# Patient Record
Sex: MALE | Race: WHITE | NOT HISPANIC OR LATINO | URBAN - METROPOLITAN AREA
[De-identification: names, ages, dates, MRNs, and addresses within clinical notes are randomized per-mention and may not be internally consistent; named-entity substitution may affect disease eponyms.]

---

## 2017-05-30 ENCOUNTER — OUTPATIENT (OUTPATIENT)
Dept: OUTPATIENT SERVICES | Facility: HOSPITAL | Age: 77
LOS: 1 days | Discharge: HOME | End: 2017-05-30

## 2017-06-28 DIAGNOSIS — I48.91 UNSPECIFIED ATRIAL FIBRILLATION: ICD-10-CM

## 2017-06-28 DIAGNOSIS — Z79.01 LONG TERM (CURRENT) USE OF ANTICOAGULANTS: ICD-10-CM

## 2017-07-19 ENCOUNTER — OUTPATIENT (OUTPATIENT)
Dept: OUTPATIENT SERVICES | Facility: HOSPITAL | Age: 77
LOS: 1 days | Discharge: HOME | End: 2017-07-19

## 2017-07-19 DIAGNOSIS — Z79.01 LONG TERM (CURRENT) USE OF ANTICOAGULANTS: ICD-10-CM

## 2017-07-19 DIAGNOSIS — I48.91 UNSPECIFIED ATRIAL FIBRILLATION: ICD-10-CM

## 2017-08-30 ENCOUNTER — OUTPATIENT (OUTPATIENT)
Dept: OUTPATIENT SERVICES | Facility: HOSPITAL | Age: 77
LOS: 1 days | Discharge: HOME | End: 2017-08-30

## 2017-08-30 DIAGNOSIS — Z79.01 LONG TERM (CURRENT) USE OF ANTICOAGULANTS: ICD-10-CM

## 2017-08-30 DIAGNOSIS — I48.91 UNSPECIFIED ATRIAL FIBRILLATION: ICD-10-CM

## 2017-10-11 ENCOUNTER — OUTPATIENT (OUTPATIENT)
Dept: OUTPATIENT SERVICES | Facility: HOSPITAL | Age: 77
LOS: 1 days | Discharge: HOME | End: 2017-10-11

## 2017-10-11 DIAGNOSIS — I48.91 UNSPECIFIED ATRIAL FIBRILLATION: ICD-10-CM

## 2017-10-11 DIAGNOSIS — Z79.01 LONG TERM (CURRENT) USE OF ANTICOAGULANTS: ICD-10-CM

## 2017-11-21 ENCOUNTER — OUTPATIENT (OUTPATIENT)
Dept: OUTPATIENT SERVICES | Facility: HOSPITAL | Age: 77
LOS: 1 days | Discharge: HOME | End: 2017-11-21

## 2017-11-21 DIAGNOSIS — Z79.01 LONG TERM (CURRENT) USE OF ANTICOAGULANTS: ICD-10-CM

## 2017-11-21 DIAGNOSIS — I48.91 UNSPECIFIED ATRIAL FIBRILLATION: ICD-10-CM

## 2018-01-02 ENCOUNTER — OUTPATIENT (OUTPATIENT)
Dept: OUTPATIENT SERVICES | Facility: HOSPITAL | Age: 78
LOS: 1 days | Discharge: HOME | End: 2018-01-02

## 2018-01-02 DIAGNOSIS — I48.91 UNSPECIFIED ATRIAL FIBRILLATION: ICD-10-CM

## 2018-01-02 DIAGNOSIS — Z79.01 LONG TERM (CURRENT) USE OF ANTICOAGULANTS: ICD-10-CM

## 2018-02-13 ENCOUNTER — OUTPATIENT (OUTPATIENT)
Dept: OUTPATIENT SERVICES | Facility: HOSPITAL | Age: 78
LOS: 1 days | Discharge: HOME | End: 2018-02-13

## 2018-02-13 DIAGNOSIS — Z79.01 LONG TERM (CURRENT) USE OF ANTICOAGULANTS: ICD-10-CM

## 2018-02-13 DIAGNOSIS — I48.91 UNSPECIFIED ATRIAL FIBRILLATION: ICD-10-CM

## 2018-03-27 ENCOUNTER — OUTPATIENT (OUTPATIENT)
Dept: OUTPATIENT SERVICES | Facility: HOSPITAL | Age: 78
LOS: 1 days | Discharge: HOME | End: 2018-03-27

## 2018-03-27 DIAGNOSIS — Z79.01 LONG TERM (CURRENT) USE OF ANTICOAGULANTS: ICD-10-CM

## 2018-03-27 DIAGNOSIS — I48.91 UNSPECIFIED ATRIAL FIBRILLATION: ICD-10-CM

## 2018-05-08 ENCOUNTER — OUTPATIENT (OUTPATIENT)
Dept: OUTPATIENT SERVICES | Facility: HOSPITAL | Age: 78
LOS: 1 days | Discharge: HOME | End: 2018-05-08

## 2018-05-08 DIAGNOSIS — Z79.01 LONG TERM (CURRENT) USE OF ANTICOAGULANTS: ICD-10-CM

## 2018-05-08 DIAGNOSIS — I48.91 UNSPECIFIED ATRIAL FIBRILLATION: ICD-10-CM

## 2018-06-19 ENCOUNTER — OUTPATIENT (OUTPATIENT)
Dept: OUTPATIENT SERVICES | Facility: HOSPITAL | Age: 78
LOS: 1 days | Discharge: HOME | End: 2018-06-19

## 2018-06-19 DIAGNOSIS — Z79.01 LONG TERM (CURRENT) USE OF ANTICOAGULANTS: ICD-10-CM

## 2018-06-19 DIAGNOSIS — I48.91 UNSPECIFIED ATRIAL FIBRILLATION: ICD-10-CM

## 2018-07-31 ENCOUNTER — OUTPATIENT (OUTPATIENT)
Dept: OUTPATIENT SERVICES | Facility: HOSPITAL | Age: 78
LOS: 1 days | Discharge: HOME | End: 2018-07-31

## 2018-07-31 DIAGNOSIS — Z79.01 LONG TERM (CURRENT) USE OF ANTICOAGULANTS: ICD-10-CM

## 2018-07-31 DIAGNOSIS — I48.91 UNSPECIFIED ATRIAL FIBRILLATION: ICD-10-CM

## 2018-08-28 ENCOUNTER — OUTPATIENT (OUTPATIENT)
Dept: OUTPATIENT SERVICES | Facility: HOSPITAL | Age: 78
LOS: 1 days | Discharge: HOME | End: 2018-08-28

## 2018-08-28 DIAGNOSIS — R07.9 CHEST PAIN, UNSPECIFIED: ICD-10-CM

## 2018-08-28 DIAGNOSIS — R06.02 SHORTNESS OF BREATH: ICD-10-CM

## 2018-09-18 ENCOUNTER — OUTPATIENT (OUTPATIENT)
Dept: OUTPATIENT SERVICES | Facility: HOSPITAL | Age: 78
LOS: 1 days | Discharge: HOME | End: 2018-09-18

## 2018-09-18 DIAGNOSIS — Z79.01 LONG TERM (CURRENT) USE OF ANTICOAGULANTS: ICD-10-CM

## 2018-09-18 DIAGNOSIS — I48.91 UNSPECIFIED ATRIAL FIBRILLATION: ICD-10-CM

## 2018-09-18 LAB
POCT INR: 2.8 RATIO — HIGH (ref 0.9–1.2)
POCT PT: 33.3 SEC — HIGH (ref 10–13.4)

## 2018-09-28 ENCOUNTER — APPOINTMENT (OUTPATIENT)
Dept: CARDIOTHORACIC SURGERY | Facility: CLINIC | Age: 78
End: 2018-09-28
Payer: MEDICARE

## 2018-09-28 PROCEDURE — 99204 OFFICE O/P NEW MOD 45 MIN: CPT

## 2018-10-02 ENCOUNTER — OUTPATIENT (OUTPATIENT)
Dept: OUTPATIENT SERVICES | Facility: HOSPITAL | Age: 78
LOS: 1 days | Discharge: HOME | End: 2018-10-02

## 2018-10-02 DIAGNOSIS — Z79.01 LONG TERM (CURRENT) USE OF ANTICOAGULANTS: ICD-10-CM

## 2018-10-02 DIAGNOSIS — I25.10 ATHEROSCLEROTIC HEART DISEASE OF NATIVE CORONARY ARTERY WITHOUT ANGINA PECTORIS: ICD-10-CM

## 2018-10-02 DIAGNOSIS — Z01.810 ENCOUNTER FOR PREPROCEDURAL CARDIOVASCULAR EXAMINATION: ICD-10-CM

## 2018-10-04 ENCOUNTER — INPATIENT (INPATIENT)
Facility: HOSPITAL | Age: 78
LOS: 7 days | Discharge: HOME | End: 2018-10-12
Attending: INTERNAL MEDICINE | Admitting: INTERNAL MEDICINE

## 2018-10-04 VITALS
HEART RATE: 56 BPM | OXYGEN SATURATION: 96 % | RESPIRATION RATE: 19 BRPM | DIASTOLIC BLOOD PRESSURE: 79 MMHG | TEMPERATURE: 96 F | SYSTOLIC BLOOD PRESSURE: 158 MMHG

## 2018-10-04 DIAGNOSIS — Z95.1 PRESENCE OF AORTOCORONARY BYPASS GRAFT: Chronic | ICD-10-CM

## 2018-10-04 DIAGNOSIS — Z98.890 OTHER SPECIFIED POSTPROCEDURAL STATES: Chronic | ICD-10-CM

## 2018-10-04 PROBLEM — Z00.00 ENCOUNTER FOR PREVENTIVE HEALTH EXAMINATION: Status: ACTIVE | Noted: 2018-10-04

## 2018-10-04 LAB
ALBUMIN SERPL ELPH-MCNC: 3.8 G/DL — SIGNIFICANT CHANGE UP (ref 3.5–5.2)
ALP SERPL-CCNC: 153 U/L — HIGH (ref 30–115)
ALT FLD-CCNC: 18 U/L — SIGNIFICANT CHANGE UP (ref 0–41)
ANION GAP SERPL CALC-SCNC: 13 MMOL/L — SIGNIFICANT CHANGE UP (ref 7–14)
AST SERPL-CCNC: 30 U/L — SIGNIFICANT CHANGE UP (ref 0–41)
BILIRUB SERPL-MCNC: 1.2 MG/DL — SIGNIFICANT CHANGE UP (ref 0.2–1.2)
BUN SERPL-MCNC: 15 MG/DL — SIGNIFICANT CHANGE UP (ref 10–20)
CALCIUM SERPL-MCNC: 8.8 MG/DL — SIGNIFICANT CHANGE UP (ref 8.5–10.1)
CHLORIDE SERPL-SCNC: 105 MMOL/L — SIGNIFICANT CHANGE UP (ref 98–110)
CO2 SERPL-SCNC: 22 MMOL/L — SIGNIFICANT CHANGE UP (ref 17–32)
CREAT SERPL-MCNC: 0.8 MG/DL — SIGNIFICANT CHANGE UP (ref 0.7–1.5)
GLUCOSE BLDC GLUCOMTR-MCNC: 107 MG/DL — HIGH (ref 70–99)
GLUCOSE BLDC GLUCOMTR-MCNC: 93 MG/DL — SIGNIFICANT CHANGE UP (ref 70–99)
GLUCOSE SERPL-MCNC: 108 MG/DL — HIGH (ref 70–99)
HCT VFR BLD CALC: 39.1 % — LOW (ref 42–52)
HGB BLD-MCNC: 12.8 G/DL — LOW (ref 14–18)
INR BLD: 3.38 RATIO — HIGH (ref 0.65–1.3)
MAGNESIUM SERPL-MCNC: 1.9 MG/DL — SIGNIFICANT CHANGE UP (ref 1.8–2.4)
MCHC RBC-ENTMCNC: 31.7 PG — HIGH (ref 27–31)
MCHC RBC-ENTMCNC: 32.7 G/DL — SIGNIFICANT CHANGE UP (ref 32–37)
MCV RBC AUTO: 96.8 FL — HIGH (ref 80–94)
NRBC # BLD: 0 /100 WBCS — SIGNIFICANT CHANGE UP (ref 0–0)
PLATELET # BLD AUTO: 120 K/UL — LOW (ref 130–400)
POTASSIUM SERPL-MCNC: 4.7 MMOL/L — SIGNIFICANT CHANGE UP (ref 3.5–5)
POTASSIUM SERPL-SCNC: 4.7 MMOL/L — SIGNIFICANT CHANGE UP (ref 3.5–5)
PROT SERPL-MCNC: 6.6 G/DL — SIGNIFICANT CHANGE UP (ref 6–8)
PROTHROM AB SERPL-ACNC: 36.1 SEC — HIGH (ref 9.95–12.87)
RBC # BLD: 4.04 M/UL — LOW (ref 4.7–6.1)
RBC # FLD: 15.1 % — HIGH (ref 11.5–14.5)
SODIUM SERPL-SCNC: 140 MMOL/L — SIGNIFICANT CHANGE UP (ref 135–146)
WBC # BLD: 5.84 K/UL — SIGNIFICANT CHANGE UP (ref 4.8–10.8)
WBC # FLD AUTO: 5.84 K/UL — SIGNIFICANT CHANGE UP (ref 4.8–10.8)

## 2018-10-04 RX ORDER — DEXTROSE 50 % IN WATER 50 %
12.5 SYRINGE (ML) INTRAVENOUS ONCE
Qty: 0 | Refills: 0 | Status: DISCONTINUED | OUTPATIENT
Start: 2018-10-04 | End: 2018-10-11

## 2018-10-04 RX ORDER — AMIODARONE HYDROCHLORIDE 400 MG/1
200 TABLET ORAL DAILY
Qty: 0 | Refills: 0 | Status: DISCONTINUED | OUTPATIENT
Start: 2018-10-04 | End: 2018-10-05

## 2018-10-04 RX ORDER — WARFARIN SODIUM 2.5 MG/1
1 TABLET ORAL
Qty: 0 | Refills: 0 | COMMUNITY

## 2018-10-04 RX ORDER — LOSARTAN POTASSIUM 100 MG/1
25 TABLET, FILM COATED ORAL DAILY
Qty: 0 | Refills: 0 | Status: DISCONTINUED | OUTPATIENT
Start: 2018-10-04 | End: 2018-10-11

## 2018-10-04 RX ORDER — INSULIN GLARGINE 100 [IU]/ML
15 INJECTION, SOLUTION SUBCUTANEOUS AT BEDTIME
Qty: 0 | Refills: 0 | Status: DISCONTINUED | OUTPATIENT
Start: 2018-10-04 | End: 2018-10-08

## 2018-10-04 RX ORDER — DEXTROSE 50 % IN WATER 50 %
25 SYRINGE (ML) INTRAVENOUS ONCE
Qty: 0 | Refills: 0 | Status: DISCONTINUED | OUTPATIENT
Start: 2018-10-04 | End: 2018-10-11

## 2018-10-04 RX ORDER — FUROSEMIDE 40 MG
1 TABLET ORAL
Qty: 0 | Refills: 0 | COMMUNITY

## 2018-10-04 RX ORDER — GLUCAGON INJECTION, SOLUTION 0.5 MG/.1ML
1 INJECTION, SOLUTION SUBCUTANEOUS ONCE
Qty: 0 | Refills: 0 | Status: DISCONTINUED | OUTPATIENT
Start: 2018-10-04 | End: 2018-10-11

## 2018-10-04 RX ORDER — UBIDECARENONE 100 MG
1 CAPSULE ORAL
Qty: 0 | Refills: 0 | COMMUNITY

## 2018-10-04 RX ORDER — METFORMIN HYDROCHLORIDE 850 MG/1
1 TABLET ORAL
Qty: 0 | Refills: 0 | COMMUNITY

## 2018-10-04 RX ORDER — AMIODARONE HYDROCHLORIDE 400 MG/1
150 TABLET ORAL ONCE
Qty: 0 | Refills: 0 | Status: COMPLETED | OUTPATIENT
Start: 2018-10-04 | End: 2018-10-04

## 2018-10-04 RX ORDER — AMIODARONE HYDROCHLORIDE 400 MG/1
1 TABLET ORAL
Qty: 900 | Refills: 0 | Status: DISCONTINUED | OUTPATIENT
Start: 2018-10-04 | End: 2018-10-05

## 2018-10-04 RX ORDER — FUROSEMIDE 40 MG
20 TABLET ORAL DAILY
Qty: 0 | Refills: 0 | Status: DISCONTINUED | OUTPATIENT
Start: 2018-10-04 | End: 2018-10-11

## 2018-10-04 RX ORDER — SODIUM CHLORIDE 9 MG/ML
1000 INJECTION, SOLUTION INTRAVENOUS
Qty: 0 | Refills: 0 | Status: DISCONTINUED | OUTPATIENT
Start: 2018-10-04 | End: 2018-10-09

## 2018-10-04 RX ORDER — INSULIN LISPRO 100/ML
5 VIAL (ML) SUBCUTANEOUS
Qty: 0 | Refills: 0 | Status: DISCONTINUED | OUTPATIENT
Start: 2018-10-04 | End: 2018-10-11

## 2018-10-04 RX ORDER — EZETIMIBE 10 MG/1
1 TABLET ORAL
Qty: 0 | Refills: 0 | COMMUNITY

## 2018-10-04 RX ORDER — AMLODIPINE BESYLATE 2.5 MG/1
5 TABLET ORAL DAILY
Qty: 0 | Refills: 0 | Status: DISCONTINUED | OUTPATIENT
Start: 2018-10-04 | End: 2018-10-05

## 2018-10-04 RX ORDER — AMIODARONE HYDROCHLORIDE 400 MG/1
0.5 TABLET ORAL
Qty: 900 | Refills: 0 | Status: DISCONTINUED | OUTPATIENT
Start: 2018-10-04 | End: 2018-10-04

## 2018-10-04 RX ORDER — SIMVASTATIN 20 MG/1
20 TABLET, FILM COATED ORAL AT BEDTIME
Qty: 0 | Refills: 0 | Status: DISCONTINUED | OUTPATIENT
Start: 2018-10-04 | End: 2018-10-11

## 2018-10-04 RX ORDER — SIMVASTATIN 20 MG/1
1 TABLET, FILM COATED ORAL
Qty: 0 | Refills: 0 | COMMUNITY

## 2018-10-04 RX ORDER — LOSARTAN POTASSIUM 100 MG/1
1 TABLET, FILM COATED ORAL
Qty: 0 | Refills: 0 | COMMUNITY

## 2018-10-04 RX ORDER — DEXTROSE 50 % IN WATER 50 %
15 SYRINGE (ML) INTRAVENOUS ONCE
Qty: 0 | Refills: 0 | Status: DISCONTINUED | OUTPATIENT
Start: 2018-10-04 | End: 2018-10-11

## 2018-10-04 RX ADMIN — LOSARTAN POTASSIUM 25 MILLIGRAM(S): 100 TABLET, FILM COATED ORAL at 18:57

## 2018-10-04 RX ADMIN — SIMVASTATIN 20 MILLIGRAM(S): 20 TABLET, FILM COATED ORAL at 21:44

## 2018-10-04 RX ADMIN — AMIODARONE HYDROCHLORIDE 618 MILLIGRAM(S): 400 TABLET ORAL at 12:30

## 2018-10-04 RX ADMIN — Medication 20 MILLIGRAM(S): at 18:59

## 2018-10-04 NOTE — PROGRESS NOTE ADULT - SUBJECTIVE AND OBJECTIVE BOX
PREOPERATIVE DAY OR PROCEDURE EVALUATION    I have personally seen and examined the patient.  I agree with the history and physical which I have reviewed and noted any changes below.  (ELECTRONICALLY SIGNED: - [id number] @ 13:15)                                                              POST OPERATIVE PROCEDURAL DOCUMENTATION  PRE-OP DIAGNOSIS: MR     POST-OP DIAGNOSIS Severe MR     PROCEDURE: Transesophageal echocardiogram    Primary Physician: Shamar Griffiths MD  Fellow: George Alvarado     ANESTHESIA TYPE  [  ] General Anesthesia  [ x ] Conscious Sedation  [  ] Local/Regional    CONDITION  [  ] Critical  [  ] Serious  [  ] Fair  [ x ] Good    SPECIMENS REMOVED (IF APPLICABLE): NA    IMPLANTS (IF APPLICABLE): None    ESTIMATED BLOOD LOSS: None    COMPLICATIONS: None    FINDINGS  LVEF 50%   Severe MR   Moderate to severe TR   Aortic valve trileaflet no AS   Pulmonary valve normal   LA mildly enlarged   LUIS M no evidence of thrombus mildly decreased PW velocities     DIAGNOSIS/IMPRESSION:  Serer MR   During the procedure the patient went into monomorphic VT SP Lidocaine IV and Electrical cardioversion x 2 .   Currently in NSR and in stable condition     PLAN OF CARE:  Will admit to CCU   STAT blood work sent   Possible coronary angiogram tomorrow or Monday according to INR values   hold coumadin for now   Hold BB for bradycardia PREOPERATIVE DAY OR PROCEDURE EVALUATION    I have personally seen and examined the patient.  I agree with the history and physical which I have reviewed and noted any changes below.  (ELECTRONICALLY SIGNED: - [id number] @ 13:15)                                                              POST OPERATIVE PROCEDURAL DOCUMENTATION  PRE-OP DIAGNOSIS: MR     POST-OP DIAGNOSIS Severe MR     PROCEDURE: Transesophageal echocardiogram    Primary Physician: Shamar Griffiths MD  Fellow: George Alvarado     ANESTHESIA TYPE  [  ] General Anesthesia  [ x ] Conscious Sedation  [  ] Local/Regional    CONDITION  [  ] Critical  [  ] Serious  [  ] Fair  [ x ] Good    SPECIMENS REMOVED (IF APPLICABLE): NA    IMPLANTS (IF APPLICABLE): None    ESTIMATED BLOOD LOSS: None    COMPLICATIONS:Pt developed sustained VTach during procedure without EKG changes, without hypoxia that required electrical cardioverion with 100 joules biphasic   Pt developed VT again post procedure that was given 100 joules as it broke   Amio 150mg was given   and pt was to be admitted to CCU for eventual cath and possible ICD  and evnetual mitral clip whenstable in the future       FINDINGS  LVEF 50%   Severe MR   Moderate to severe TR   Aortic valve trileaflet no AS   Pulmonary valve normal   LA mildly enlarged   LUIS M no evidence of thrombus mildly decreased PW velocities     DIAGNOSIS/IMPRESSION:  Serer MR   During the procedure the patient went into monomorphic VT SP Lidocaine IV and Electrical cardioversion x 2 .   Currently in NSR and in stable condition     PLAN OF CARE:  Will admit to CCU   STAT blood work sent   Possible coronary angiogram tomorrow or Monday according to INR values   hold coumadin for now   Hold BB for bradycardia

## 2018-10-04 NOTE — H&P CARDIOLOGY - HISTORY OF PRESENT ILLNESS
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

## 2018-10-04 NOTE — CONSULT NOTE ADULT - SUBJECTIVE AND OBJECTIVE BOX
HISTORY OF PRESENT ILLNESS:   78 years old male with PMHX of CAD s/p  CABG 25 years ago (LIMA to LAD SVG to CX and RCA), old inferior MI, HTN,  NIDDM, EF ~ 45% EF appears, Afib on coumadin, severe MR with elevated PAP ~ 60 mmHg presented initially for elective MELBA for further evaluation of the MR.   During the MELBA procedure, patient developed sustained VTach x2 time  , BP stable, Patient received in total two shock of 100 joules and one dose of IV lidocaine , and 150 mg of IV amiodarone  , currently on afib with slow rater ~ 50's , amiodarone drip stopped.   patient seen at bedside, awake oriented, denies chest pain , active shortness of breath or palpitations. was complaining of chronic dyspnea on exertion at home.        PAST MEDICAL & SURGICAL HISTORY:  Mitral regurgitation  Afib  Diabetes  Hypertension  MI, old  CAD (coronary artery disease)  S/P subdural hematoma evacuation  S/P CABG x 1    FAMILY HISTORY:  No pertinent family history in first degree relatives    Allergies    Biaxin (Stomach Upset)    Intolerances    	  Home Medications:  CoQ10 300 mg oral capsule: 1 cap(s) orally once a day (04 Oct 2018 12:42)  ezetimibe 10 mg oral tablet: 1 tab(s) orally once a day (04 Oct 2018 12:42)  furosemide 20 mg oral tablet: 1 tab(s) orally once a day (04 Oct 2018 12:42)  losartan 25 mg oral tablet: 1 tab(s) orally once a day (04 Oct 2018 12:42)  metFORMIN 500 mg oral tablet, extended release: 1 tab(s) orally once a day (04 Oct 2018 12:42)  metoprolol succinate 25 mg oral tablet, extended release: 1 tab(s) orally once a day (04 Oct 2018 12:42)  simvastatin 20 mg oral tablet: 1 tab(s) orally once a day (at bedtime) (04 Oct 2018 12:42)  warfarin 2.5 mg oral tablet: 1 tab(s) orally once a day (04 Oct 2018 12:42)    MEDICATIONS  (STANDING):  amiodarone    Tablet 200 milliGRAM(s) Oral daily  amiodarone Infusion 1 mG/Min (33.333 mL/Hr) IV Continuous <Continuous>  dextrose 5%. 1000 milliLiter(s) (50 mL/Hr) IV Continuous <Continuous>  dextrose 50% Injectable 12.5 Gram(s) IV Push once  dextrose 50% Injectable 25 Gram(s) IV Push once  dextrose 50% Injectable 25 Gram(s) IV Push once  furosemide    Tablet 20 milliGRAM(s) Oral daily  insulin glargine Injectable (LANTUS) 15 Unit(s) SubCutaneous at bedtime  insulin lispro Injectable (HumaLOG) 5 Unit(s) SubCutaneous before breakfast  insulin lispro Injectable (HumaLOG) 5 Unit(s) SubCutaneous before lunch  insulin lispro Injectable (HumaLOG) 5 Unit(s) SubCutaneous before dinner  losartan 25 milliGRAM(s) Oral daily  simvastatin 20 milliGRAM(s) Oral at bedtime    MEDICATIONS  (PRN):  dextrose 40% Gel 15 Gram(s) Oral once PRN Blood Glucose LESS THAN 70 milliGRAM(s)/deciliter  glucagon  Injectable 1 milliGRAM(s) IntraMuscular once PRN Glucose LESS THAN 70 milligrams/deciliter          General Appearance: Normal	  Cardiovascular: Normal S1 S2, systolic apical murmur   Respiratory: Lungs clear   Psychiatry: A & O x 3,   Gastrointestinal:  Soft, Non-tender  Extremities: no ARLEEN      LABS:	 	                        12.8   5.84  )-----------( 120      ( 04 Oct 2018 12:20 )             39.1     10-04    140  |  105  |  15  ----------------------------<  108<H>  4.7   |  22  |  0.8    Ca    8.8      04 Oct 2018 12:20  Mg     1.9     10-04    TPro  6.6  /  Alb  3.8  /  TBili  1.2  /  DBili  x   /  AST  30  /  ALT  18  /  AlkPhos  153<H>  10-04         ECG:  afib , inferior wall MI 	    	    PREVIOUS DIAGNOSTIC TESTING:    [ ] Echocardiogram: 8/20/2018  EF 45-50%  severe MR  mild concentric LVH  moderate TR +      [ ] Nuclear Stress Test:  	8/29/2018    < from: NM Nuclear Stress Pharmacologic Multiple (08.28.18 @ 10:41) >  Impression:  1. IV Adenosine Dual Isotope Study which was negative with respect to   symptoms and EKG changes.  2. Myocardial perfusion imaging reveals infarction in the distribution of  left circumflex coronary artery with minimal superimposed ischemia with a   persistently dilated left ventricle  3. Gated imaging reveals severe hypokinesia and decreased thickening of   the inferolateral wall with ejection fraction moderately reduced at 40-45%    < end of copied text >

## 2018-10-04 NOTE — CONSULT NOTE ADULT - ASSESSMENT
-Sustained Vtach during MELBA, afib with aberrancy, with baseline permanent afib, ischemia vs old scar origin.   -severe MR  -hx of CAD s/p CABG   -HTN, DM  -CHADSVASC = 5    Plan:  CCU monitoring  R/o ischemic event will need cardiac cath likely on Monday given elevated INR  hold coumadin for now prior to cath, can bridge with heparin or lovenox once INR < 2 prior to cath  can continue amiodarone po   continue statin   Eventually will need ICD/life vest

## 2018-10-04 NOTE — H&P CARDIOLOGY - PMH
Afib    CAD (coronary artery disease)    Diabetes    Hypertension    MI, old    Mitral regurgitation

## 2018-10-05 DIAGNOSIS — Z02.9 ENCOUNTER FOR ADMINISTRATIVE EXAMINATIONS, UNSPECIFIED: ICD-10-CM

## 2018-10-05 LAB
ALBUMIN SERPL ELPH-MCNC: 4.2 G/DL — SIGNIFICANT CHANGE UP (ref 3.5–5.2)
ALP SERPL-CCNC: 168 U/L — HIGH (ref 30–115)
ALT FLD-CCNC: 21 U/L — SIGNIFICANT CHANGE UP (ref 0–41)
ANION GAP SERPL CALC-SCNC: 15 MMOL/L — HIGH (ref 7–14)
ANION GAP SERPL CALC-SCNC: 18 MMOL/L — HIGH (ref 7–14)
ANION GAP SERPL CALC-SCNC: 20 MMOL/L — HIGH (ref 7–14)
APTT BLD: 47.1 SEC — HIGH (ref 27–39.2)
AST SERPL-CCNC: 32 U/L — SIGNIFICANT CHANGE UP (ref 0–41)
BILIRUB SERPL-MCNC: 2 MG/DL — HIGH (ref 0.2–1.2)
BUN SERPL-MCNC: 13 MG/DL — SIGNIFICANT CHANGE UP (ref 10–20)
BUN SERPL-MCNC: 14 MG/DL — SIGNIFICANT CHANGE UP (ref 10–20)
BUN SERPL-MCNC: 15 MG/DL — SIGNIFICANT CHANGE UP (ref 10–20)
CALCIUM SERPL-MCNC: 9.1 MG/DL — SIGNIFICANT CHANGE UP (ref 8.5–10.1)
CALCIUM SERPL-MCNC: 9.2 MG/DL — SIGNIFICANT CHANGE UP (ref 8.5–10.1)
CALCIUM SERPL-MCNC: 9.6 MG/DL — SIGNIFICANT CHANGE UP (ref 8.5–10.1)
CHLORIDE SERPL-SCNC: 101 MMOL/L — SIGNIFICANT CHANGE UP (ref 98–110)
CHLORIDE SERPL-SCNC: 96 MMOL/L — LOW (ref 98–110)
CHLORIDE SERPL-SCNC: 99 MMOL/L — SIGNIFICANT CHANGE UP (ref 98–110)
CK MB CFR SERPL CALC: 2.9 NG/ML — SIGNIFICANT CHANGE UP (ref 0.6–6.3)
CK SERPL-CCNC: 58 U/L — SIGNIFICANT CHANGE UP (ref 0–225)
CK SERPL-CCNC: 58 U/L — SIGNIFICANT CHANGE UP (ref 0–225)
CO2 SERPL-SCNC: 21 MMOL/L — SIGNIFICANT CHANGE UP (ref 17–32)
CO2 SERPL-SCNC: 24 MMOL/L — SIGNIFICANT CHANGE UP (ref 17–32)
CO2 SERPL-SCNC: 25 MMOL/L — SIGNIFICANT CHANGE UP (ref 17–32)
CREAT SERPL-MCNC: 0.7 MG/DL — SIGNIFICANT CHANGE UP (ref 0.7–1.5)
CREAT SERPL-MCNC: 0.8 MG/DL — SIGNIFICANT CHANGE UP (ref 0.7–1.5)
CREAT SERPL-MCNC: 0.8 MG/DL — SIGNIFICANT CHANGE UP (ref 0.7–1.5)
ESTIMATED AVERAGE GLUCOSE: 117 MG/DL — HIGH (ref 68–114)
GLUCOSE BLDC GLUCOMTR-MCNC: 104 MG/DL — HIGH (ref 70–99)
GLUCOSE BLDC GLUCOMTR-MCNC: 109 MG/DL — HIGH (ref 70–99)
GLUCOSE BLDC GLUCOMTR-MCNC: 94 MG/DL — SIGNIFICANT CHANGE UP (ref 70–99)
GLUCOSE BLDC GLUCOMTR-MCNC: 99 MG/DL — SIGNIFICANT CHANGE UP (ref 70–99)
GLUCOSE SERPL-MCNC: 142 MG/DL — HIGH (ref 70–99)
GLUCOSE SERPL-MCNC: 97 MG/DL — SIGNIFICANT CHANGE UP (ref 70–99)
GLUCOSE SERPL-MCNC: 97 MG/DL — SIGNIFICANT CHANGE UP (ref 70–99)
HBA1C BLD-MCNC: 5.7 % — HIGH (ref 4–5.6)
HCT VFR BLD CALC: 41.4 % — LOW (ref 42–52)
HGB BLD-MCNC: 13.8 G/DL — LOW (ref 14–18)
INR BLD: 3.22 RATIO — HIGH (ref 0.65–1.3)
MAGNESIUM SERPL-MCNC: 1.8 MG/DL — SIGNIFICANT CHANGE UP (ref 1.8–2.4)
MAGNESIUM SERPL-MCNC: 1.8 MG/DL — SIGNIFICANT CHANGE UP (ref 1.8–2.4)
MAGNESIUM SERPL-MCNC: 2.4 MG/DL — SIGNIFICANT CHANGE UP (ref 1.8–2.4)
MCHC RBC-ENTMCNC: 31.9 PG — HIGH (ref 27–31)
MCHC RBC-ENTMCNC: 33.3 G/DL — SIGNIFICANT CHANGE UP (ref 32–37)
MCV RBC AUTO: 95.8 FL — HIGH (ref 80–94)
NRBC # BLD: 0 /100 WBCS — SIGNIFICANT CHANGE UP (ref 0–0)
PLATELET # BLD AUTO: 125 K/UL — LOW (ref 130–400)
POTASSIUM SERPL-MCNC: 4.1 MMOL/L — SIGNIFICANT CHANGE UP (ref 3.5–5)
POTASSIUM SERPL-MCNC: 4.1 MMOL/L — SIGNIFICANT CHANGE UP (ref 3.5–5)
POTASSIUM SERPL-MCNC: 4.5 MMOL/L — SIGNIFICANT CHANGE UP (ref 3.5–5)
POTASSIUM SERPL-SCNC: 4.1 MMOL/L — SIGNIFICANT CHANGE UP (ref 3.5–5)
POTASSIUM SERPL-SCNC: 4.1 MMOL/L — SIGNIFICANT CHANGE UP (ref 3.5–5)
POTASSIUM SERPL-SCNC: 4.5 MMOL/L — SIGNIFICANT CHANGE UP (ref 3.5–5)
PROT SERPL-MCNC: 7.2 G/DL — SIGNIFICANT CHANGE UP (ref 6–8)
PROTHROM AB SERPL-ACNC: 34.4 SEC — HIGH (ref 9.95–12.87)
RBC # BLD: 4.32 M/UL — LOW (ref 4.7–6.1)
RBC # FLD: 15 % — HIGH (ref 11.5–14.5)
SODIUM SERPL-SCNC: 139 MMOL/L — SIGNIFICANT CHANGE UP (ref 135–146)
SODIUM SERPL-SCNC: 140 MMOL/L — SIGNIFICANT CHANGE UP (ref 135–146)
SODIUM SERPL-SCNC: 140 MMOL/L — SIGNIFICANT CHANGE UP (ref 135–146)
TROPONIN T SERPL-MCNC: <0.01 NG/ML — SIGNIFICANT CHANGE UP
WBC # BLD: 7.77 K/UL — SIGNIFICANT CHANGE UP (ref 4.8–10.8)
WBC # FLD AUTO: 7.77 K/UL — SIGNIFICANT CHANGE UP (ref 4.8–10.8)

## 2018-10-05 RX ORDER — CHLORHEXIDINE GLUCONATE 213 G/1000ML
1 SOLUTION TOPICAL
Qty: 0 | Refills: 0 | Status: DISCONTINUED | OUTPATIENT
Start: 2018-10-05 | End: 2018-10-11

## 2018-10-05 RX ORDER — MAGNESIUM SULFATE 500 MG/ML
2 VIAL (ML) INJECTION ONCE
Qty: 0 | Refills: 0 | Status: COMPLETED | OUTPATIENT
Start: 2018-10-05 | End: 2018-10-05

## 2018-10-05 RX ORDER — HEPARIN SODIUM 5000 [USP'U]/ML
5000 INJECTION INTRAVENOUS; SUBCUTANEOUS EVERY 8 HOURS
Qty: 0 | Refills: 0 | Status: DISCONTINUED | OUTPATIENT
Start: 2018-10-05 | End: 2018-10-05

## 2018-10-05 RX ORDER — AMLODIPINE BESYLATE 2.5 MG/1
5 TABLET ORAL DAILY
Qty: 0 | Refills: 0 | Status: DISCONTINUED | OUTPATIENT
Start: 2018-10-05 | End: 2018-10-11

## 2018-10-05 RX ADMIN — Medication 20 MILLIGRAM(S): at 05:18

## 2018-10-05 RX ADMIN — Medication 5 UNIT(S): at 08:26

## 2018-10-05 RX ADMIN — AMLODIPINE BESYLATE 5 MILLIGRAM(S): 2.5 TABLET ORAL at 05:18

## 2018-10-05 RX ADMIN — SIMVASTATIN 20 MILLIGRAM(S): 20 TABLET, FILM COATED ORAL at 21:39

## 2018-10-05 RX ADMIN — AMIODARONE HYDROCHLORIDE 200 MILLIGRAM(S): 400 TABLET ORAL at 05:18

## 2018-10-05 RX ADMIN — Medication 50 GRAM(S): at 10:39

## 2018-10-05 RX ADMIN — LOSARTAN POTASSIUM 25 MILLIGRAM(S): 100 TABLET, FILM COATED ORAL at 05:18

## 2018-10-05 RX ADMIN — Medication 5 UNIT(S): at 13:13

## 2018-10-05 NOTE — PROGRESS NOTE ADULT - ASSESSMENT
78 years old male with PMHX of CAD s/p  CABG 25 years ago (LIMA to LAD SVG to CX and RCA), old inferior MI, HTN,NIDDM, EF ~ 45% EF appears, Afib on coumadin, severe MR with elevated PAP ~ 60 mmHg admitted for Vtach during MELBA.    # Ventricular Arrhythmia  - currently in sinus rhythm  - stop amiodarone PO  - start lidocaine drip if it reoccurs    # Mitral regurgitation  - cardiac cath monday, per cardio  - AICD if no stent placement needed  - trend cardiac enzymes    # Afib   - hold coumadin until INR < 2.0, start heparin afterwards    # Diabetes Mellitus  - Lantus 15 + Lispro 5/5/5  - stable FS glucose   # Code: Full code

## 2018-10-05 NOTE — PROGRESS NOTE ADULT - ASSESSMENT
A.F SLOW V.RATE   ECG showed prolongedQT with PVC single and pairs , with TWI in V2 and V3    plan:  stop amiodarone  if VT reccurs, can start Lidocaine  50 mg iv bolus then 1mg/min for 24hrs  check BMP for K and Mg and trop  give 2g of IV Mg now  hold coumadin for cath on monday

## 2018-10-05 NOTE — PROGRESS NOTE ADULT - SUBJECTIVE AND OBJECTIVE BOX
SUBJ:  no cp or sob  s/p MEBLA and s/p Vtach    MEDICATIONS  (STANDING):  amiodarone    Tablet 200 milliGRAM(s) Oral daily  amiodarone Infusion 1 mG/Min (33.333 mL/Hr) IV Continuous <Continuous>  amLODIPine   Tablet 5 milliGRAM(s) Oral daily  dextrose 5%. 1000 milliLiter(s) (50 mL/Hr) IV Continuous <Continuous>  dextrose 50% Injectable 12.5 Gram(s) IV Push once  dextrose 50% Injectable 25 Gram(s) IV Push once  dextrose 50% Injectable 25 Gram(s) IV Push once  furosemide    Tablet 20 milliGRAM(s) Oral daily  insulin glargine Injectable (LANTUS) 15 Unit(s) SubCutaneous at bedtime  insulin lispro Injectable (HumaLOG) 5 Unit(s) SubCutaneous before breakfast  insulin lispro Injectable (HumaLOG) 5 Unit(s) SubCutaneous before lunch  insulin lispro Injectable (HumaLOG) 5 Unit(s) SubCutaneous before dinner  losartan 25 milliGRAM(s) Oral daily  simvastatin 20 milliGRAM(s) Oral at bedtime    MEDICATIONS  (PRN):  dextrose 40% Gel 15 Gram(s) Oral once PRN Blood Glucose LESS THAN 70 milliGRAM(s)/deciliter  glucagon  Injectable 1 milliGRAM(s) IntraMuscular once PRN Glucose LESS THAN 70 milligrams/deciliter            Vital Signs Last 24 Hrs  T(C): 35.7 (05 Oct 2018 00:00), Max: 36.1 (04 Oct 2018 20:00)  T(F): 96.2 (05 Oct 2018 00:00), Max: 97 (04 Oct 2018 20:00)  HR: 60 (05 Oct 2018 06:00) (54 - 62)  BP: 162/84 (05 Oct 2018 06:00) (158/79 - 174/95)  BP(mean): 112 (05 Oct 2018 06:00) (107 - 132)  RR: 18 (04 Oct 2018 20:00) (18 - 21)  SpO2: 96% (05 Oct 2018 06:00) (94% - 97%)          PHYSICAL EXAM:  · CONSTITUTIONAL:	Well-developed, well nourished    BMI-  ·RESPIRATORY:   airway patent; breath sounds equal; good air movement; respirations non-labored; clear to auscultation bilaterally; no chest wall tenderness; no intercostal retractions; no rales,rhonchi or wheeze  · CARDIOVASCULAR	irregular rate and rhythm  2/6 systolic apical mumur   · EXTREMITIES: No cyanosis, clubbing or edema  · VASCULAR: 	Equal and normal pulses (carotid, femoral, dorsalis pedis)  	  TELEMETRY: afib PVC     ECG:    TTE:    LABS:                        13.8   7.77  )-----------( 125      ( 05 Oct 2018 04:54 )             41.4     10-05    140  |  101  |  13  ----------------------------<  97  4.5   |  21  |  0.7    Ca    9.1      05 Oct 2018 04:54  Mg     1.8     10-05    TPro  7.2  /  Alb  4.2  /  TBili  2.0<H>  /  DBili  x   /  AST  32  /  ALT  21  /  AlkPhos  168<H>  10-05        PT/INR - ( 05 Oct 2018 04:54 )   PT: 34.40 sec;   INR: 3.22 ratio         PTT - ( 05 Oct 2018 04:54 )  PTT:47.1 sec    I&O's Summary    04 Oct 2018 07:01  -  05 Oct 2018 07:00  --------------------------------------------------------  IN: 460 mL / OUT: 1000 mL / NET: -540 mL      BNP  RADIOLOGY & ADDITIONAL STUDIES:    IMPRESSION AND PLAN:  s/P CABG >10 yrs ago with LIMA to LAD SVG to CX and RCA  Recent thall fixed inf defect some superimposed ishemia inferiorly   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 Inf wall basal and mid  akinetic on echo further review   Chronic permanent  afib  EF 45-50%  Severe MR   Mod TR   Elevated PSAP   S/p sponataneous sustained monomorphic VTach at rate of 157 requiring Lido 50mg and amio 150 bolus and cardioversion 100J during procedure, amio d/killian due to bradycardia    Beta was held due to bradycardia wouold continue amio for now at 200 a day     Rec  Hold coumadin when INR less than 2.0 start heparin  NPO after midnight sunday for [facility] monday   If not need intervention would ask surg for single chamber ICD on Tuesday   if require intervention will need life vest and eventual icd   after above ill be re-evaluated for mitral valve clip

## 2018-10-05 NOTE — PROGRESS NOTE ADULT - SUBJECTIVE AND OBJECTIVE BOX
Patient is a 78y old Male who presents with a chief complaint of VT DURING MELBA (05 Oct 2018 09:42)    Currently admitted to medicine with the primary diagnosis of   Today is hospital day 1d.    BRIEF HOSPITAL COURSE:   78 years old male with PMHX of CAD s/p  CABG 25 years ago (LIMA to LAD SVG to CX and RCA), old inferior MI, HTN,NIDDM, EF ~ 45% EF appears, Afib on coumadin, severe MR with elevated PAP ~ 60 mmHg presented initially for elective MELBA for further evaluation of the MR. During the MELBA procedure, patient developed sustained VTach x2 time, BP stable, Patient received in total two shock of 100 joules and one dose of IV lidocaine, and 150 mg of IV amiodarone, currently on afib with slow rater ~ 50's , amiodarone drip stopped. patient seen at bedside, awake oriented, denies chest pain , active shortness of breath or palpitations. was complaining of chronic dyspnea on exertion at home.  Admitted to CCU for further cardiac monitoring to evaluation.    EVENTS LAST 24HRS:   None    PAST MEDICAL & SURGICAL HISTORY  Mitral regurgitation  Afib  Diabetes  Hypertension  MI, old  CAD (coronary artery disease)  S/P subdural hematoma evacuation  S/P CABG x 1      SOCIAL HISTORY:      REVIEW OF SYSTEMS:  See HPI    ALLERGIES:  Biaxin (Stomach Upset)    MEDICATIONS:  STANDING MEDICATIONS  amLODIPine   Tablet 5 milliGRAM(s) Oral daily  chlorhexidine 4% Liquid 1 Application(s) Topical <User Schedule>  dextrose 5%. 1000 milliLiter(s) IV Continuous <Continuous>  dextrose 50% Injectable 12.5 Gram(s) IV Push once  dextrose 50% Injectable 25 Gram(s) IV Push once  dextrose 50% Injectable 25 Gram(s) IV Push once  furosemide    Tablet 20 milliGRAM(s) Oral daily  insulin glargine Injectable (LANTUS) 15 Unit(s) SubCutaneous at bedtime  insulin lispro Injectable (HumaLOG) 5 Unit(s) SubCutaneous before breakfast  insulin lispro Injectable (HumaLOG) 5 Unit(s) SubCutaneous before lunch  insulin lispro Injectable (HumaLOG) 5 Unit(s) SubCutaneous before dinner  losartan 25 milliGRAM(s) Oral daily  simvastatin 20 milliGRAM(s) Oral at bedtime    PRN MEDICATIONS  dextrose 40% Gel 15 Gram(s) Oral once PRN  glucagon  Injectable 1 milliGRAM(s) IntraMuscular once PRN    VITALS:         ICU Vital Signs Last 24 Hrs:    T(C): 36.4 (05 Oct 2018 07:24), Max: 36.4 (05 Oct 2018 07:24)  T(F): 97.5 (05 Oct 2018 07:24), Max: 97.5 (05 Oct 2018 07:24)  HR: 62 (05 Oct 2018 07:24) (54 - 62)  BP: 143/86 (05 Oct 2018 07:24) (143/86 - 174/95)  BP(mean): 123 (05 Oct 2018 07:24) (107 - 132)  ABP: --  ABP(mean): --  RR: 25 (05 Oct 2018 07:24) (18 - 25)  SpO2: 97% (05 Oct 2018 07:24) (94% - 97%)          I&O's Detail:      04 Oct 2018 07:01  -  05 Oct 2018 07:00  --------------------------------------------------------  IN:    Oral Fluid: 460 mL  Total IN: 460 mL    OUT:    Voided: 1525 mL  Total OUT: 1525 mL    Total NET: -1065 mL      05 Oct 2018 07:01  -  05 Oct 2018 10:44  --------------------------------------------------------  IN:  Total IN: 0 mL    OUT:    Voided: 100 mL  Total OUT: 100 mL    Total NET: -100 mL          LABS:                        13.8   7.77  )-----------( 125      ( 05 Oct 2018 04:54 )             41.4     10-05    140  |  101  |  13  ----------------------------<  97  4.5   |  21  |  0.7    Ca    9.1      05 Oct 2018 04:54  Mg     1.8     10-05    TPro  7.2  /  Alb  4.2  /  TBili  2.0<H>  /  DBili  x   /  AST  32  /  ALT  21  /  AlkPhos  168<H>  10-05          CAPILLARY BLOOD GLUCOSE      POCT Blood Glucose.: 104 mg/dL (05 Oct 2018 07:43)    PT/INR - ( 05 Oct 2018 04:54 )   PT: 34.40 sec;   INR: 3.22 ratio         PTT - ( 05 Oct 2018 04:54 )  PTT:47.1 sec    CULTURES:      PHYSICAL EXAM:    General: No acute distress.  Alert, oriented, interactive, nonfocal.    HEENT: Pupils equal, reactive to light symmetrically.    PULM: Clear to auscultation bilaterally, no significant sputum production.    CVS: Regular rate and rhythm, no murmurs, rubs, or gallops.    ABD: Soft, nondistended, nontender, no masses.    EXT: No edema, nontender.    SKIN: Warm and well perfused, no rashes noted.    RADIOLOGY:

## 2018-10-06 LAB
ALBUMIN SERPL ELPH-MCNC: 4 G/DL — SIGNIFICANT CHANGE UP (ref 3.5–5.2)
ALP SERPL-CCNC: 155 U/L — HIGH (ref 30–115)
ALT FLD-CCNC: 18 U/L — SIGNIFICANT CHANGE UP (ref 0–41)
ANION GAP SERPL CALC-SCNC: 17 MMOL/L — HIGH (ref 7–14)
APTT BLD: 42.6 SEC — HIGH (ref 27–39.2)
AST SERPL-CCNC: 28 U/L — SIGNIFICANT CHANGE UP (ref 0–41)
BILIRUB SERPL-MCNC: 1.9 MG/DL — HIGH (ref 0.2–1.2)
BUN SERPL-MCNC: 15 MG/DL — SIGNIFICANT CHANGE UP (ref 10–20)
CALCIUM SERPL-MCNC: 9 MG/DL — SIGNIFICANT CHANGE UP (ref 8.5–10.1)
CHLORIDE SERPL-SCNC: 99 MMOL/L — SIGNIFICANT CHANGE UP (ref 98–110)
CO2 SERPL-SCNC: 23 MMOL/L — SIGNIFICANT CHANGE UP (ref 17–32)
CREAT SERPL-MCNC: 0.8 MG/DL — SIGNIFICANT CHANGE UP (ref 0.7–1.5)
GLUCOSE BLDC GLUCOMTR-MCNC: 139 MG/DL — HIGH (ref 70–99)
GLUCOSE BLDC GLUCOMTR-MCNC: 190 MG/DL — HIGH (ref 70–99)
GLUCOSE BLDC GLUCOMTR-MCNC: 93 MG/DL — SIGNIFICANT CHANGE UP (ref 70–99)
GLUCOSE SERPL-MCNC: 87 MG/DL — SIGNIFICANT CHANGE UP (ref 70–99)
HCT VFR BLD CALC: 42.3 % — SIGNIFICANT CHANGE UP (ref 42–52)
HGB BLD-MCNC: 13.9 G/DL — LOW (ref 14–18)
INR BLD: 2.72 RATIO — HIGH (ref 0.65–1.3)
MAGNESIUM SERPL-MCNC: 2 MG/DL — SIGNIFICANT CHANGE UP (ref 1.8–2.4)
MCHC RBC-ENTMCNC: 31.2 PG — HIGH (ref 27–31)
MCHC RBC-ENTMCNC: 32.9 G/DL — SIGNIFICANT CHANGE UP (ref 32–37)
MCV RBC AUTO: 95.1 FL — HIGH (ref 80–94)
NRBC # BLD: 0 /100 WBCS — SIGNIFICANT CHANGE UP (ref 0–0)
PLATELET # BLD AUTO: 120 K/UL — LOW (ref 130–400)
POTASSIUM SERPL-MCNC: 4.5 MMOL/L — SIGNIFICANT CHANGE UP (ref 3.5–5)
POTASSIUM SERPL-SCNC: 4.5 MMOL/L — SIGNIFICANT CHANGE UP (ref 3.5–5)
PROT SERPL-MCNC: 6.9 G/DL — SIGNIFICANT CHANGE UP (ref 6–8)
PROTHROM AB SERPL-ACNC: 29.1 SEC — HIGH (ref 9.95–12.87)
RBC # BLD: 4.45 M/UL — LOW (ref 4.7–6.1)
RBC # FLD: 14.8 % — HIGH (ref 11.5–14.5)
SODIUM SERPL-SCNC: 139 MMOL/L — SIGNIFICANT CHANGE UP (ref 135–146)
WBC # BLD: 8.08 K/UL — SIGNIFICANT CHANGE UP (ref 4.8–10.8)
WBC # FLD AUTO: 8.08 K/UL — SIGNIFICANT CHANGE UP (ref 4.8–10.8)

## 2018-10-06 RX ADMIN — CHLORHEXIDINE GLUCONATE 1 APPLICATION(S): 213 SOLUTION TOPICAL at 06:00

## 2018-10-06 RX ADMIN — Medication 5 UNIT(S): at 12:49

## 2018-10-06 RX ADMIN — INSULIN GLARGINE 15 UNIT(S): 100 INJECTION, SOLUTION SUBCUTANEOUS at 22:11

## 2018-10-06 RX ADMIN — LOSARTAN POTASSIUM 25 MILLIGRAM(S): 100 TABLET, FILM COATED ORAL at 06:00

## 2018-10-06 RX ADMIN — SIMVASTATIN 20 MILLIGRAM(S): 20 TABLET, FILM COATED ORAL at 22:12

## 2018-10-06 RX ADMIN — Medication 20 MILLIGRAM(S): at 06:00

## 2018-10-06 RX ADMIN — AMLODIPINE BESYLATE 5 MILLIGRAM(S): 2.5 TABLET ORAL at 06:00

## 2018-10-06 NOTE — PROGRESS NOTE ADULT - SUBJECTIVE AND OBJECTIVE BOX
Patient is a 78y old Male who presents with a chief complaint of VT (06 Oct 2018 10:22)    Currently admitted to medicine with the primary diagnosis of   Today is hospital day 2d.    BRIEF HOSPITAL COURSE:   78 years old male with PMHX of CAD s/p  CABG 25 years ago (LIMA to LAD SVG to CX and RCA), old inferior MI, HTN,NIDDM, EF ~ 45% EF appears, Afib on coumadin, severe MR with elevated PAP ~ 60 mmHg presented initially for elective MELBA for further evaluation of the MR. During the MELBA procedure, patient developed sustained VTach x2 time, BP stable, Patient received in total two shock of 100 joules and one dose of IV lidocaine, and 150 mg of IV amiodarone, currently on afib with slow rater ~ 50's , amiodarone drip stopped. patient seen at bedside, awake oriented, denies chest pain , active shortness of breath or palpitations. was complaining of chronic dyspnea on exertion at home.  Admitted to CCU for further cardiac monitoring to evaluation.    EVENTS LAST 24HRS:   Episodes of NSVT    PAST MEDICAL & SURGICAL HISTORY  Mitral regurgitation  Afib  Diabetes  Hypertension  MI, old  CAD (coronary artery disease)  S/P subdural hematoma evacuation  S/P CABG x 1      SOCIAL HISTORY:      REVIEW OF SYSTEMS:  See HPI    ALLERGIES:  Biaxin (Stomach Upset)    MEDICATIONS:  STANDING MEDICATIONS  amLODIPine   Tablet 5 milliGRAM(s) Oral daily  chlorhexidine 4% Liquid 1 Application(s) Topical <User Schedule>  dextrose 5%. 1000 milliLiter(s) IV Continuous <Continuous>  dextrose 50% Injectable 12.5 Gram(s) IV Push once  dextrose 50% Injectable 25 Gram(s) IV Push once  dextrose 50% Injectable 25 Gram(s) IV Push once  furosemide    Tablet 20 milliGRAM(s) Oral daily  insulin glargine Injectable (LANTUS) 15 Unit(s) SubCutaneous at bedtime  insulin lispro Injectable (HumaLOG) 5 Unit(s) SubCutaneous before breakfast  insulin lispro Injectable (HumaLOG) 5 Unit(s) SubCutaneous before lunch  insulin lispro Injectable (HumaLOG) 5 Unit(s) SubCutaneous before dinner  losartan 25 milliGRAM(s) Oral daily  simvastatin 20 milliGRAM(s) Oral at bedtime    PRN MEDICATIONS  dextrose 40% Gel 15 Gram(s) Oral once PRN  glucagon  Injectable 1 milliGRAM(s) IntraMuscular once PRN    VITALS:         ICU Vital Signs Last 24 Hrs:    T(C): 35.8 (06 Oct 2018 11:57), Max: 36.2 (05 Oct 2018 16:00)  T(F): 96.4 (06 Oct 2018 11:57), Max: 97.2 (05 Oct 2018 20:15)  HR: 60 (06 Oct 2018 13:57) (49 - 76)  BP: 121/71 (06 Oct 2018 13:57) (84/64 - 158/87)  BP(mean): 89 (06 Oct 2018 13:57) (71 - 140)  ABP: --  ABP(mean): --  RR: 23 (06 Oct 2018 13:57) (15 - 37)  SpO2: 98% (06 Oct 2018 13:57) (92% - 98%)          I&O's Detail:      05 Oct 2018 07:01  -  06 Oct 2018 07:00  --------------------------------------------------------  IN:    IV PiggyBack: 50 mL    Oral Fluid: 480 mL  Total IN: 530 mL    OUT:    Voided: 1025 mL  Total OUT: 1025 mL    Total NET: -495 mL      06 Oct 2018 07:01  -  06 Oct 2018 15:24  --------------------------------------------------------  IN:  Total IN: 0 mL    OUT:    Voided: 400 mL  Total OUT: 400 mL    Total NET: -400 mL          LABS:                        13.9   8.08  )-----------( 120      ( 06 Oct 2018 05:02 )             42.3     10-06    139  |  99  |  15  ----------------------------<  87  4.5   |  23  |  0.8    Ca    9.0      06 Oct 2018 05:02  Mg     2.0     10-06    TPro  6.9  /  Alb  4.0  /  TBili  1.9<H>  /  DBili  x   /  AST  28  /  ALT  18  /  AlkPhos  155<H>  10-06      CARDIAC MARKERS ( 05 Oct 2018 12:40 )  x     / <0.01 ng/mL / 58 U/L / x     / 2.9 ng/mL  CARDIAC MARKERS ( 05 Oct 2018 10:13 )  x     / x     / 58 U/L / x     / x          CAPILLARY BLOOD GLUCOSE      POCT Blood Glucose.: 139 mg/dL (06 Oct 2018 12:46)    PT/INR - ( 06 Oct 2018 05:02 )   PT: 29.10 sec;   INR: 2.72 ratio         PTT - ( 06 Oct 2018 05:02 )  PTT:42.6 sec    CULTURES:      PHYSICAL EXAM:    General: No acute distress.  Alert, oriented, interactive, nonfocal.    HEENT: Pupils equal, reactive to light symmetrically.    PULM: Clear to auscultation bilaterally, no significant sputum production.    CVS: Regular rate and rhythm, no murmurs, rubs, or gallops.    ABD: Soft, nondistended, nontender, no masses.    EXT: No edema, nontender.    SKIN: Warm and well perfused, no rashes noted.    RADIOLOGY:

## 2018-10-06 NOTE — PROGRESS NOTE ADULT - ASSESSMENT
78 years old male with PMHX of CAD s/p  CABG 25 years ago (LIMA to LAD SVG to CX and RCA), old inferior MI, HTN,NIDDM, EF ~ 45% EF appears, Afib on coumadin, severe MR with elevated PAP ~ 60 mmHg admitted for Vtach during MELBA.    # Ventricular Arrhythmia  - currently in sinus rhythm  - start lidocaine drip if it reoccurs    # Mitral regurgitation  - cardiac cath monday, per cardio  - AICD if no stent placement needed  - trend cardiac enzymes    # Afib   - hold coumadin until INR < 2.0, start heparin afterwards    # Diabetes Mellitus  - Lantus 15 + Lispro 5/5/5  - stable FS glucose     # Code: Full code

## 2018-10-06 NOTE — PROGRESS NOTE ADULT - SUBJECTIVE AND OBJECTIVE BOX
SUBJ:  78-yo male with h/o CAD, remote IWMI and CABG. Severe MR now, sustained VT during MELBA. No complaints today.    MEDICATIONS  (STANDING):  amLODIPine   Tablet 5 milliGRAM(s) Oral daily  chlorhexidine 4% Liquid 1 Application(s) Topical <User Schedule>  dextrose 5%. 1000 milliLiter(s) (50 mL/Hr) IV Continuous <Continuous>  dextrose 50% Injectable 12.5 Gram(s) IV Push once  dextrose 50% Injectable 25 Gram(s) IV Push once  dextrose 50% Injectable 25 Gram(s) IV Push once  furosemide    Tablet 20 milliGRAM(s) Oral daily  insulin glargine Injectable (LANTUS) 15 Unit(s) SubCutaneous at bedtime  insulin lispro Injectable (HumaLOG) 5 Unit(s) SubCutaneous before breakfast  insulin lispro Injectable (HumaLOG) 5 Unit(s) SubCutaneous before lunch  insulin lispro Injectable (HumaLOG) 5 Unit(s) SubCutaneous before dinner  losartan 25 milliGRAM(s) Oral daily  simvastatin 20 milliGRAM(s) Oral at bedtime    MEDICATIONS  (PRN):  dextrose 40% Gel 15 Gram(s) Oral once PRN Blood Glucose LESS THAN 70 milliGRAM(s)/deciliter  glucagon  Injectable 1 milliGRAM(s) IntraMuscular once PRN Glucose LESS THAN 70 milligrams/deciliter            Vital Signs Last 24 Hrs  T(C): 36.1 (06 Oct 2018 00:00), Max: 36.2 (05 Oct 2018 16:00)  T(F): 97 (06 Oct 2018 00:00), Max: 97.2 (05 Oct 2018 20:15)  HR: 75 (06 Oct 2018 08:00) (49 - 75)  BP: 145/71 (06 Oct 2018 08:00) (118/70 - 158/87)  BP(mean): 117 (06 Oct 2018 06:00) (78 - 140)  RR: 20 (06 Oct 2018 08:00) (15 - 27)  SpO2: 98% (06 Oct 2018 08:00) (92% - 98%)    REVIEW OF SYSTEMS:  CONSTITUTIONAL: No fever, weight loss, or fatigue  Patient denies chest pain, shortness of breath or syncopal episodes.       PHYSICAL EXAM:  · CONSTITUTIONAL:	Well-developed, well nourished  · RESPIRATORY:   breath sounds equal; good air movement; respirations non-labored; clear to auscultation bilaterally; no rales, rhonchi or wheezing  · CARDIOVASCULAR	irregular rhythm, no gallop, soft SM at the apex  · EXTREMITIES: No cyanosis, clubbing or edema  · NECK: No JVD, no bruit  	  TELEMETRY: A-fib, VR controlled      LABS:                        13.9   8.08  )-----------( 120      ( 06 Oct 2018 05:02 )             42.3     10-06    139  |  99  |  15  ----------------------------<  87  4.5   |  23  |  0.8    Ca    9.0      06 Oct 2018 05:02  Mg     2.0     10-06    TPro  6.9  /  Alb  4.0  /  TBili  1.9<H>  /  DBili  x   /  AST  28  /  ALT  18  /  AlkPhos  155<H>  10-06    CARDIAC MARKERS ( 05 Oct 2018 12:40 )  x     / <0.01 ng/mL / 58 U/L / x     / 2.9 ng/mL  CARDIAC MARKERS ( 05 Oct 2018 10:13 )  x     / x     / 58 U/L / x     / x          PT/INR - ( 06 Oct 2018 05:02 )   PT: 29.10 sec;   INR: 2.72 ratio         PTT - ( 06 Oct 2018 05:02 )  PTT:42.6 sec    I&O's Summary    05 Oct 2018 07:01  -  06 Oct 2018 07:00  --------------------------------------------------------  IN: 530 mL / OUT: 1025 mL / NET: -495 mL      IMPRESSION AND PLAN:  CAD, s/p IWMI and CABG, LVEF 45%.  Permanent A-fib.  Sustained VT.    Plan:  C on Monday if INR < 2.  Continue to hold Coumadin, check INR daily.  Keep in CCU, check electrolytes daily.  Will need ICD versus LifeVest depending on cath result/intervention.

## 2018-10-07 LAB
ALBUMIN SERPL ELPH-MCNC: 3.6 G/DL — SIGNIFICANT CHANGE UP (ref 3.5–5.2)
ALP SERPL-CCNC: 157 U/L — HIGH (ref 30–115)
ALT FLD-CCNC: 18 U/L — SIGNIFICANT CHANGE UP (ref 0–41)
ANION GAP SERPL CALC-SCNC: 15 MMOL/L — HIGH (ref 7–14)
APTT BLD: 39.7 SEC — HIGH (ref 27–39.2)
AST SERPL-CCNC: 28 U/L — SIGNIFICANT CHANGE UP (ref 0–41)
BILIRUB SERPL-MCNC: 1.3 MG/DL — HIGH (ref 0.2–1.2)
BUN SERPL-MCNC: 21 MG/DL — HIGH (ref 10–20)
CALCIUM SERPL-MCNC: 8.9 MG/DL — SIGNIFICANT CHANGE UP (ref 8.5–10.1)
CHLORIDE SERPL-SCNC: 101 MMOL/L — SIGNIFICANT CHANGE UP (ref 98–110)
CO2 SERPL-SCNC: 23 MMOL/L — SIGNIFICANT CHANGE UP (ref 17–32)
CREAT SERPL-MCNC: 0.8 MG/DL — SIGNIFICANT CHANGE UP (ref 0.7–1.5)
GLUCOSE BLDC GLUCOMTR-MCNC: 108 MG/DL — HIGH (ref 70–99)
GLUCOSE BLDC GLUCOMTR-MCNC: 109 MG/DL — HIGH (ref 70–99)
GLUCOSE BLDC GLUCOMTR-MCNC: 111 MG/DL — HIGH (ref 70–99)
GLUCOSE BLDC GLUCOMTR-MCNC: 83 MG/DL — SIGNIFICANT CHANGE UP (ref 70–99)
GLUCOSE BLDC GLUCOMTR-MCNC: 88 MG/DL — SIGNIFICANT CHANGE UP (ref 70–99)
GLUCOSE BLDC GLUCOMTR-MCNC: 90 MG/DL — SIGNIFICANT CHANGE UP (ref 70–99)
GLUCOSE SERPL-MCNC: 81 MG/DL — SIGNIFICANT CHANGE UP (ref 70–99)
HCT VFR BLD CALC: 40.6 % — LOW (ref 42–52)
HGB BLD-MCNC: 13.5 G/DL — LOW (ref 14–18)
INR BLD: 2.31 RATIO — HIGH (ref 0.65–1.3)
MAGNESIUM SERPL-MCNC: 2 MG/DL — SIGNIFICANT CHANGE UP (ref 1.8–2.4)
MCHC RBC-ENTMCNC: 31.5 PG — HIGH (ref 27–31)
MCHC RBC-ENTMCNC: 33.3 G/DL — SIGNIFICANT CHANGE UP (ref 32–37)
MCV RBC AUTO: 94.6 FL — HIGH (ref 80–94)
NRBC # BLD: 0 /100 WBCS — SIGNIFICANT CHANGE UP (ref 0–0)
PLATELET # BLD AUTO: 143 K/UL — SIGNIFICANT CHANGE UP (ref 130–400)
POTASSIUM SERPL-MCNC: 4.4 MMOL/L — SIGNIFICANT CHANGE UP (ref 3.5–5)
POTASSIUM SERPL-SCNC: 4.4 MMOL/L — SIGNIFICANT CHANGE UP (ref 3.5–5)
PROT SERPL-MCNC: 6.6 G/DL — SIGNIFICANT CHANGE UP (ref 6–8)
PROTHROM AB SERPL-ACNC: 24.7 SEC — HIGH (ref 9.95–12.87)
RBC # BLD: 4.29 M/UL — LOW (ref 4.7–6.1)
RBC # FLD: 14.9 % — HIGH (ref 11.5–14.5)
SODIUM SERPL-SCNC: 139 MMOL/L — SIGNIFICANT CHANGE UP (ref 135–146)
TROPONIN T SERPL-MCNC: <0.01 NG/ML — SIGNIFICANT CHANGE UP
WBC # BLD: 8.11 K/UL — SIGNIFICANT CHANGE UP (ref 4.8–10.8)
WBC # FLD AUTO: 8.11 K/UL — SIGNIFICANT CHANGE UP (ref 4.8–10.8)

## 2018-10-07 RX ADMIN — LOSARTAN POTASSIUM 25 MILLIGRAM(S): 100 TABLET, FILM COATED ORAL at 05:03

## 2018-10-07 RX ADMIN — AMLODIPINE BESYLATE 5 MILLIGRAM(S): 2.5 TABLET ORAL at 05:03

## 2018-10-07 RX ADMIN — Medication 20 MILLIGRAM(S): at 05:03

## 2018-10-07 RX ADMIN — SIMVASTATIN 20 MILLIGRAM(S): 20 TABLET, FILM COATED ORAL at 21:19

## 2018-10-07 RX ADMIN — Medication 5 UNIT(S): at 12:36

## 2018-10-07 NOTE — PROGRESS NOTE ADULT - SUBJECTIVE AND OBJECTIVE BOX
SUBJ:  78-yo male who presented with sustained VT. Severe MRBj Pérez, Coumadin on hold. Denies palpitations, SOB.    MEDICATIONS  (STANDING):  amLODIPine   Tablet 5 milliGRAM(s) Oral daily  chlorhexidine 4% Liquid 1 Application(s) Topical <User Schedule>  dextrose 5%. 1000 milliLiter(s) (50 mL/Hr) IV Continuous <Continuous>  dextrose 50% Injectable 12.5 Gram(s) IV Push once  dextrose 50% Injectable 25 Gram(s) IV Push once  dextrose 50% Injectable 25 Gram(s) IV Push once  furosemide    Tablet 20 milliGRAM(s) Oral daily  insulin glargine Injectable (LANTUS) 15 Unit(s) SubCutaneous at bedtime  insulin lispro Injectable (HumaLOG) 5 Unit(s) SubCutaneous before breakfast  insulin lispro Injectable (HumaLOG) 5 Unit(s) SubCutaneous before lunch  insulin lispro Injectable (HumaLOG) 5 Unit(s) SubCutaneous before dinner  losartan 25 milliGRAM(s) Oral daily  simvastatin 20 milliGRAM(s) Oral at bedtime    MEDICATIONS  (PRN):  dextrose 40% Gel 15 Gram(s) Oral once PRN Blood Glucose LESS THAN 70 milliGRAM(s)/deciliter  glucagon  Injectable 1 milliGRAM(s) IntraMuscular once PRN Glucose LESS THAN 70 milligrams/deciliter            Vital Signs Last 24 Hrs  T(C): 36.1 (07 Oct 2018 08:00), Max: 36.8 (07 Oct 2018 00:00)  T(F): 96.9 (07 Oct 2018 08:00), Max: 98.2 (07 Oct 2018 00:00)  HR: 58 (07 Oct 2018 08:00) (46 - 70)  BP: 109/67 (07 Oct 2018 08:00) (84/64 - 139/81)  BP(mean): 77 (07 Oct 2018 08:00) (75 - 115)  RR: 19 (07 Oct 2018 08:00) (15 - 37)  SpO2: 95% (07 Oct 2018 08:00) (94% - 98%)    REVIEW OF SYSTEMS:  CONSTITUTIONAL: No fever, weight loss, or fatigue  Patient denies chest pain, shortness of breath or syncopal episodes.       PHYSICAL EXAM:  · CONSTITUTIONAL:	Well-developed, well nourished,   BMI-  · RESPIRATORY:   breath sounds equal; good air movement; respirations non-labored; clear to auscultation bilaterally; no rales, rhonchi or wheezing  · CARDIOVASCULAR	irregular rhythm,  no rub, no gallop, soft SM unchanged  · EXTREMITIES: No cyanosis, clubbing or edema  · VASCULAR: 	Equal and normal pulses (carotid, femoral, dorsalis pedis)  	  TELEMETRY: short runs of NSVT overnight.    LABS:                        13.5   8.11  )-----------( 143      ( 07 Oct 2018 05:00 )             40.6     10-07    139  |  101  |  21<H>  ----------------------------<  81  4.4   |  23  |  0.8    Ca    8.9      07 Oct 2018 05:00  Mg     2.0     10-07    TPro  6.6  /  Alb  3.6  /  TBili  1.3<H>  /  DBili  x   /  AST  28  /  ALT  18  /  AlkPhos  157<H>  10-07    CARDIAC MARKERS ( 07 Oct 2018 05:00 )  x     / <0.01 ng/mL / x     / x     / x      CARDIAC MARKERS ( 05 Oct 2018 12:40 )  x     / <0.01 ng/mL / 58 U/L / x     / 2.9 ng/mL      PT/INR - ( 07 Oct 2018 05:00 )   PT: 24.70 sec;   INR: 2.31 ratio         PTT - ( 07 Oct 2018 05:00 )  PTT:39.7 sec    I&O's Summary    06 Oct 2018 07:01  -  07 Oct 2018 07:00  --------------------------------------------------------  IN: 0 mL / OUT: 1101 mL / NET: -1101 mL    07 Oct 2018 07:01  -  07 Oct 2018 10:49  --------------------------------------------------------  IN: 0 mL / OUT: 225 mL / NET: -225 mL        IMPRESSION AND PLAN:  VT.  Severe MR.  A-fib.    Plan:  [facility] tomorrow if INR < 2 (preferably, < 1.8).  Keep in CCU until then.

## 2018-10-07 NOTE — PROGRESS NOTE ADULT - ASSESSMENT
78 years old male with PMHX of CAD s/p  CABG 25 years ago (LIMA to LAD SVG to CX and RCA), old inferior MI, HTN,NIDDM, EF ~ 45% EF appears, Afib on coumadin, severe MR with elevated PAP ~ 60 mmHg presented initially for elective MELBA for further evaluation of the MR. During the MELBA procedure, patient developed sustained VTach x2 time, BP stable, Patient received in total two shock of 100 joules and one dose of IV lidocaine, and 150 mg of IV amiodarone, currently on Afib with slow rater ~ 50's , amiodarone drip stopped. patient seen at bedside, awake oriented, denies chest pain , active shortness of breath or palpitations. was complaining of chronic dyspnea on exertion at home.  Admitted to CCU for further cardiac monitoring to evaluation.    Ventricular Arrhythmia  - currently in sinus rhythm  - start lidocaine drip if it reoccurs    Mitral regurgitation  - cardiac cath monday, per cardio  - AICD if no stent placement needed  - trend cardiac enzymes    Atrial fibrallation  - hold coumadin until INR < 2.0, start heparin afterwards    Diabetes Mellitus  - Lantus 15 + Lispro 5/5/5  - stable FS glucose  _________________________________________  Fluid: PO intake  Nutrition: Carb Consistent  Electrolytes:      Sodium - NL      Potassium - NL      Bicarb - NL      Chloride - NL      Magnesium - NL      Phosphorus - NL      GI PPX:                                   (X) Not indicated;  () Pantoprazole 40mg Daily    DVT PPX:  () Not indicated;  () Heparin 5000 mg SubQ;  () Lovenox 40 mg SubQ;  () SCDs    ACTIVITY:  () Ad Shazia  /  (X) Increase as Tolerated  /  () OOB w/ assist  /  () Bed Rest    BMI:  Height (cm): 177.8 (10-04)  Weight (kg): 78.925 (10-04)  BMI (kg/m2): 25 (10-04)    DISPO:  Patient to be discharged when condition(s) optimized.    (X) Discussion with patient and/or family regarding goals of care.  (X) Discussed Case and Plan with the Medical Attending.    |::::::::::::::::::::::::::::| CODE STATUS |::::::::::::::::::::::::::::|        (X) FULL     ~     () DNR     ~     () DNI  |:::::::::::::::::::::::::::::::::::::::::::::::::::::::::::::::::::::::::::::::::::::::::|    ------------------------------------------------------------------------------------------------------------  Please call Dr. Yi (PGY-1) with any questions/consult recs: [facility]ra #[phone] This is an 78 year old male who presented for elective MELBA for further evaluation of his MR. During the MELBA procedure, patient developed sustained VTach x2 with stable BP. He received in total two shocks of 100 joules, one dose of IV lidocaine, and 150 mg of IV amiodarone. Since, the IV amiodarone drip has been discontinued. Admitted to CCU for further cardiac monitoring.    Ventricular Arrhythmia during MELBA  - Currently in sinus rhythm  - If reoccurs, give lidocaine bolus, then 1mg/min    Mitral regurgitation, Severe  - Cardiac cath Monday; NPO at midnight  - Holding coumadin until INR < 2.0, start heparin afterwards  - AICD if no stent placement needed  - CE negative    Atrial fibrallation  - Holding coumadin as above    Diabetes Mellitus  -Basal/Bolus insulin with SS  - stable FS glucose  _________________________________________  Fluid: PO intake  Nutrition: Carb Consistent  Electrolytes:      Sodium - NL      Potassium - NL      Bicarb - NL      Chloride - NL      Magnesium - NL      Phosphorus - NL      GI PPX:                                   (X) Not indicated;  () Pantoprazole 40mg Daily    DVT PPX:  () Not indicated;  () Heparin 5000 mg SubQ;  () Lovenox 40 mg SubQ;  () SCDs    ACTIVITY:  () Ad Shazia  /  (X) Increase as Tolerated  /  () OOB w/ assist  /  () Bed Rest    BMI:  Height (cm): 177.8 (10-04)  Weight (kg): 78.925 (10-04)  BMI (kg/m2): 25 (10-04)    DISPO:  Patient to be discharged when condition(s) optimized.    (X) Discussion with patient and/or family regarding goals of care.  (X) Discussed Case and Plan with the Medical Attending.    |::::::::::::::::::::::::::::| CODE STATUS |::::::::::::::::::::::::::::|        (X) FULL     ~     () DNR     ~     () DNI  |:::::::::::::::::::::::::::::::::::::::::::::::::::::::::::::::::::::::::::::::::::::::::|    ------------------------------------------------------------------------------------------------------------  Please call Dr. Yi (PGY-1) with any questions/consult recs: [facility] #[phone]

## 2018-10-07 NOTE — PROGRESS NOTE ADULT - SUBJECTIVE AND OBJECTIVE BOX
DAILY PROGRESS NOTE    Patient Information:  CHALINO RUSSELL  /  78y  /  Male  /  MRN#: [id number]    Admitting Diagnosis:  1. Ventricular arrythmia    Hospital Day: 3d    Past Medical History:   Mitral regurgitation  Atrial fibrillation  Diabetes  Hypertension  MI, old  CAD (coronary artery disease)  S/P subdural hematoma evacuation  S/P CABG x 1    |:::::::::::::::::::::::::::| SUBJECTIVE |:::::::::::::::::::::::::::|    OVERNIGHT EVENTS: None    TODAY: Patient was seen this morning at bedside. Currently, the patient reports ....    Review of systems is otherwise negative.    |:::::::::::::::::::::::::::| OBJECTIVE |:::::::::::::::::::::::::::|    VITAL SIGNS: Last 24 Hours      PHYSICAL EXAM:  GENERAL:     Awake, alert; NAD.  HEENT:    Head NC/AT; Conjunctivae moist & pink, Sclera anicteric & non-injected; Oral mucosa moist with no edema, erythema, exudates.  NECK:     Supple.  CARDIO:     Regular rate; Regular rhythm; S1 & S2; No M/R/G appreciated.  RESP:     Equal expansion; Good entry BL; No wheezes, rales, or rhonchi appreciated.  GI:     Soft; NT/ND; BS (+) x4 quadrants; No guarding; No rebound tenderness.  :     Deferred.  RECTAL:     Deferred.  EXT:     UE and LE tone intact; UE and LE strength 5/5; No edema in UE and LE.  NEURO:     PERRL; EOMI; CNII-XII grossly intact.  SKIN:     Good turgor; No ecchymosis, petechiae, ulcers, lacerations appreciated.    LAB RESULTS:      MICROBIOLOGY:    RADIOLOGY:    ALLERGIES:  Biaxin (Stomach Upset)    INPATIENT MEDICATIONS:  amLODIPine   Tablet 5 milliGRAM(s) Oral daily  chlorhexidine 4% Liquid 1 Application(s) Topical <User Schedule>  dextrose 5%. 1000 milliLiter(s) IV Continuous <Continuous>  furosemide    Tablet 20 milliGRAM(s) Oral daily  insulin glargine Injectable (LANTUS) 15 Unit(s) SubCutaneous at bedtime  insulin lispro Injectable (HumaLOG) 5 Unit(s) SubCutaneous before breakfast  insulin lispro Injectable (HumaLOG) 5 Unit(s) SubCutaneous before lunch  insulin lispro Injectable (HumaLOG) 5 Unit(s) SubCutaneous before dinner  losartan 25 milliGRAM(s) Oral daily  simvastatin 20 milliGRAM(s) Oral at bedtime    PRN MEDICATIONS  dextrose 40% Gel 15 Gram(s) Oral once PRN  glucagon  Injectable 1 milliGRAM(s) IntraMuscular once PRN    ------------------------------------------------------------------------------------------------------------ DAILY PROGRESS NOTE    Patient Information:  CHALINO RUSSELL  /  78y  /  Male  /  MRN#: [id number]    Admitting Diagnosis:  1. Ventricular arrythmia    Hospital Day: 3d    Past Medical History:   Mitral regurgitation  Atrial fibrillation  Diabetes  Hypertension  MI, old  CAD (coronary artery disease)  S/P subdural hematoma evacuation  S/P CABG x 1    |:::::::::::::::::::::::::::| SUBJECTIVE |:::::::::::::::::::::::::::|    OVERNIGHT EVENTS: None    TODAY: Patient was seen this morning at bedside. Currently, the patient reports ....    Review of systems is otherwise negative.    |:::::::::::::::::::::::::::| OBJECTIVE |:::::::::::::::::::::::::::|    VITAL SIGNS: Last 24 Hours  ICU Vital Signs Last 24 Hrs  T(C): 36.8 (07 Oct 2018 00:00), Max: 36.8 (07 Oct 2018 00:00)  T(F): 98.2 (07 Oct 2018 00:00), Max: 98.2 (07 Oct 2018 00:00)  HR: 46 (07 Oct 2018 02:00) (46 - 76)  BP: 101/65 (07 Oct 2018 02:00) (84/64 - 145/71)  BP(mean): 78 (07 Oct 2018 02:00) (71 - 117)  ABP: --  ABP(mean): --  RR: 15 (07 Oct 2018 02:00) (15 - 37)  SpO2: 96% (07 Oct 2018 02:00) (94% - 98%)    10-05-18 @ 07:01  -  10-06-18 @ 07:00  --------------------------------------------------------  IN: 530 mL / OUT: 1025 mL / NET: -495 mL    10-06-18 @ 07:01  -  10-07-18 @ 04:02  --------------------------------------------------------  IN: 0 mL / OUT: 401 mL / NET: -401 mL    PHYSICAL EXAM:  GENERAL:     Awake, alert; NAD.  HEENT:    Head NC/AT; Conjunctivae moist & pink, Sclera anicteric & non-injected; Oral mucosa moist with no edema, erythema, exudates.  NECK:     Supple.  CARDIO:     Regular rate; Regular rhythm; S1 & S2; No M/R/G appreciated.  RESP:     Equal expansion; Good entry BL; No wheezes, rales, or rhonchi appreciated.  GI:     Soft; NT/ND; BS (+) x4 quadrants; No guarding; No rebound tenderness.  :     Deferred.  RECTAL:     Deferred.  EXT:     UE and LE tone intact; UE and LE strength 5/5; No edema in UE and LE.  NEURO:     PERRL; EOMI; CNII-XII grossly intact.  SKIN:     Good turgor; No ecchymosis, petechiae, ulcers, lacerations appreciated.    LAB RESULTS:      MICROBIOLOGY:    RADIOLOGY:    ALLERGIES:  Biaxin (Stomach Upset)    INPATIENT MEDICATIONS:  amLODIPine   Tablet 5 milliGRAM(s) Oral daily  chlorhexidine 4% Liquid 1 Application(s) Topical <User Schedule>  dextrose 5%. 1000 milliLiter(s) IV Continuous <Continuous>  furosemide    Tablet 20 milliGRAM(s) Oral daily  insulin glargine Injectable (LANTUS) 15 Unit(s) SubCutaneous at bedtime  insulin lispro Injectable (HumaLOG) 5 Unit(s) SubCutaneous before breakfast  insulin lispro Injectable (HumaLOG) 5 Unit(s) SubCutaneous before lunch  insulin lispro Injectable (HumaLOG) 5 Unit(s) SubCutaneous before dinner  losartan 25 milliGRAM(s) Oral daily  simvastatin 20 milliGRAM(s) Oral at bedtime    PRN MEDICATIONS  dextrose 40% Gel 15 Gram(s) Oral once PRN  glucagon  Injectable 1 milliGRAM(s) IntraMuscular once PRN    ------------------------------------------------------------------------------------------------------------ DAILY PROGRESS NOTE    Patient Information:  CHALINO RUSSELL  /  78y  /  Male  /  MRN#: [id number]    Admitting Diagnosis:  1. Ventricular arrythmia    Hospital Day: 3d    Past Medical History:   Mitral regurgitation  Atrial fibrillation  Diabetes  Hypertension  MI, old  CAD (coronary artery disease)  S/P subdural hematoma evacuation  S/P CABG x 1    |:::::::::::::::::::::::::::| SUBJECTIVE |:::::::::::::::::::::::::::|    OVERNIGHT EVENTS: None    TODAY: Patient was seen this morning at bedside. Currently, the patient reports ....    Review of systems is otherwise negative.    |:::::::::::::::::::::::::::| OBJECTIVE |:::::::::::::::::::::::::::|    VITAL SIGNS: Last 24 Hours  ICU Vital Signs Last 24 Hrs  T(C): 36.8 (07 Oct 2018 00:00), Max: 36.8 (07 Oct 2018 00:00)  T(F): 98.2 (07 Oct 2018 00:00), Max: 98.2 (07 Oct 2018 00:00)  HR: 46 (07 Oct 2018 02:00) (46 - 76)  BP: 101/65 (07 Oct 2018 02:00) (84/64 - 145/71)  BP(mean): 78 (07 Oct 2018 02:00) (71 - 117)  ABP: --  ABP(mean): --  RR: 15 (07 Oct 2018 02:00) (15 - 37)  SpO2: 96% (07 Oct 2018 02:00) (94% - 98%)    10-05-18 @ 07:01  -  10-06-18 @ 07:00  --------------------------------------------------------  IN: 530 mL / OUT: 1025 mL / NET: -495 mL    10-06-18 @ 07:01  -  10-07-18 @ 04:02  --------------------------------------------------------  IN: 0 mL / OUT: 401 mL / NET: -401 mL    PHYSICAL EXAM:  GENERAL:     Awake, alert; NAD.  HEENT:    Head NC/AT; Conjunctivae pink, Sclera anicteric & non-injected.  NECK:     Supple.  CARDIO:     Regular rate; irregular rhythm; S1 & S2; No M/R/G appreciated.  RESP:     Equal expansion; Good entry BL; No wheezes, rales, or rhonchi appreciated.  GI:     Soft; NT/ND; BS (+) x4 quadrants; No guarding; No rebound tenderness.  :     Deferred.  RECTAL:     Deferred.  EXT:     UE and LE tone intact; UE and LE strength 5/5; No edema in UE and LE.  NEURO:     PERRL.  SKIN:     Good turgor.    LAB RESULTS:      MICROBIOLOGY:    RADIOLOGY:    ALLERGIES:  Biaxin (Stomach Upset)    INPATIENT MEDICATIONS:  amLODIPine   Tablet 5 milliGRAM(s) Oral daily  chlorhexidine 4% Liquid 1 Application(s) Topical <User Schedule>  dextrose 5%. 1000 milliLiter(s) IV Continuous <Continuous>  furosemide    Tablet 20 milliGRAM(s) Oral daily  insulin glargine Injectable (LANTUS) 15 Unit(s) SubCutaneous at bedtime  insulin lispro Injectable (HumaLOG) 5 Unit(s) SubCutaneous before breakfast  insulin lispro Injectable (HumaLOG) 5 Unit(s) SubCutaneous before lunch  insulin lispro Injectable (HumaLOG) 5 Unit(s) SubCutaneous before dinner  losartan 25 milliGRAM(s) Oral daily  simvastatin 20 milliGRAM(s) Oral at bedtime    PRN MEDICATIONS  dextrose 40% Gel 15 Gram(s) Oral once PRN  glucagon  Injectable 1 milliGRAM(s) IntraMuscular once PRN    ------------------------------------------------------------------------------------------------------------ DAILY PROGRESS NOTE    Patient Information:  CHALINO RUSSELL  /  78y  /  Male  /  MRN#: [id number]    Admitting Diagnosis:  1. Ventricular arrythmia    Hospital Day: 3d    Past Medical History:   Mitral regurgitation  Atrial fibrillation  Diabetes  Hypertension  MI, old  CAD (coronary artery disease)  S/P subdural hematoma evacuation  S/P CABG x 1    |:::::::::::::::::::::::::::| SUBJECTIVE |:::::::::::::::::::::::::::|    OVERNIGHT EVENTS: None    TODAY: Patient was seen this morning at bedside. Currently, the patient reports no active complaints and is anticipating his procedure on Monday.    Review of systems is otherwise negative.    |:::::::::::::::::::::::::::| OBJECTIVE |:::::::::::::::::::::::::::|    VITAL SIGNS: Last 24 Hours  T(F): 97.2 (07 Oct 2018 04:00), Max: 98.2 (07 Oct 2018 00:00)  HR: 48 (07 Oct 2018 04:00) (46 - 76)  BP: 119/61 (07 Oct 2018 04:00) (84/64 - 145/71)  BP(mean): 93 (07 Oct 2018 04:00) (71 - 115)  RR: 16 (07 Oct 2018 04:00) (15 - 37)  SpO2: 95% (07 Oct 2018 04:00) (94% - 98%)    10-05-18 @ 07:01  -  10-06-18 @ 07:00  --------------------------------------------------------  IN: 530 mL / OUT: 1025 mL / NET: -495 mL    10-06-18 @ 07:01  -  10-07-18 @ 06:00  --------------------------------------------------------  IN: 0 mL / OUT: 451 mL / NET: -451 mL    PHYSICAL EXAM:  GENERAL:     Awake, alert; NAD.  HEENT:    Head NC/AT; Conjunctivae pink, Sclera anicteric & non-injected.  NECK:     Supple.  CARDIO:     Regular rate; irregular rhythm; S1 & S2; No M/R/G appreciated.  RESP:     Equal expansion; Good entry BL; No wheezes, rales, or rhonchi appreciated.  GI:     Soft; NT/ND; BS (+) x4 quadrants; No guarding; No rebound tenderness.  :     Deferred.  RECTAL:     Deferred.  EXT:     UE and LE tone intact; UE and LE strength 5/5; No edema in UE and LE.  NEURO:     PERRL.  SKIN:     Good turgor.    LAB RESULTS:      MICROBIOLOGY:    RADIOLOGY:    ALLERGIES:  Biaxin (Stomach Upset)    INPATIENT MEDICATIONS:  amLODIPine   Tablet 5 milliGRAM(s) Oral daily  chlorhexidine 4% Liquid 1 Application(s) Topical <User Schedule>  dextrose 5%. 1000 milliLiter(s) IV Continuous <Continuous>  furosemide    Tablet 20 milliGRAM(s) Oral daily  insulin glargine Injectable (LANTUS) 15 Unit(s) SubCutaneous at bedtime  insulin lispro Injectable (HumaLOG) 5 Unit(s) SubCutaneous before breakfast  insulin lispro Injectable (HumaLOG) 5 Unit(s) SubCutaneous before lunch  insulin lispro Injectable (HumaLOG) 5 Unit(s) SubCutaneous before dinner  losartan 25 milliGRAM(s) Oral daily  simvastatin 20 milliGRAM(s) Oral at bedtime    PRN MEDICATIONS  dextrose 40% Gel 15 Gram(s) Oral once PRN  glucagon  Injectable 1 milliGRAM(s) IntraMuscular once PRN    ------------------------------------------------------------------------------------------------------------ DAILY PROGRESS NOTE    Patient Information:  CHALINO RUSSELL  /  78y  /  Male  /  MRN#: [id number]    Admitting Diagnosis:  1. Ventricular arrythmia    Hospital Day: 3d    Past Medical History:   Mitral regurgitation  Atrial fibrillation  Diabetes  Hypertension  MI, old  CAD (coronary artery disease)  S/P subdural hematoma evacuation  S/P CABG x 1    |:::::::::::::::::::::::::::| SUBJECTIVE |:::::::::::::::::::::::::::|    OVERNIGHT EVENTS: None    TODAY: Patient was seen this morning at bedside. Currently, the patient reports no active complaints and is anticipating his procedure on Monday.    Review of systems is otherwise negative.    |:::::::::::::::::::::::::::| OBJECTIVE |:::::::::::::::::::::::::::|    VITAL SIGNS: Last 24 Hours  T(F): 97.2 (07 Oct 2018 04:00), Max: 98.2 (07 Oct 2018 00:00)  HR: 48 (07 Oct 2018 04:00) (46 - 76)  BP: 119/61 (07 Oct 2018 04:00) (84/64 - 145/71)  BP(mean): 93 (07 Oct 2018 04:00) (71 - 115)  RR: 16 (07 Oct 2018 04:00) (15 - 37)  SpO2: 95% (07 Oct 2018 04:00) (94% - 98%)    10-05-18 @ 07:01  -  10-06-18 @ 07:00  --------------------------------------------------------  IN: 530 mL / OUT: 1025 mL / NET: -495 mL    10-06-18 @ 07:01  -  10-07-18 @ 06:00  --------------------------------------------------------  IN: 0 mL / OUT: 451 mL / NET: -451 mL    PHYSICAL EXAM:  GENERAL:     Awake, alert; NAD.  HEENT:    Head NC/AT; Conjunctivae pink, Sclera anicteric & non-injected.  NECK:     Supple.  CARDIO:     Regular rate; irregular rhythm; S1 & S2; No M/R/G appreciated.  RESP:     Equal expansion; Good entry BL; No wheezes, rales, or rhonchi appreciated.  GI:     Soft; NT/ND; BS (+) x4 quadrants; No guarding; No rebound tenderness.  :     Deferred.  RECTAL:     Deferred.  EXT:     UE and LE tone intact; UE and LE strength 5/5; No edema in UE and LE.  NEURO:     PERRL.  SKIN:     Good turgor.    LAB RESULTS:           13.5<L>  8.11 >----------< 143           40.6<L>    MCV: 94.6<H>  MCHC: 33.3  RDW:  14.9<H>    139  |  101  |  21<H>             --------------------------< 81     4.4  |  23  | 0.8    eGFR AA: 98  eGFR N-AA: 84    Calcium: 8.9  Phosphorus: --  Magnesium: 2.0    AST: 28    ALT: 18  AlkPhos: 157<H>  Protein: 6.6  Albumin: 3.6  TBili: 1.3<H>  D-Bili: --    PT 24.70<H>  ----------------< 2.31<H> INR  PTT 39.7<H>    MICROBIOLOGY:    RADIOLOGY:    ALLERGIES:  Biaxin (Stomach Upset)    INPATIENT MEDICATIONS:  amLODIPine   Tablet 5 milliGRAM(s) Oral daily  chlorhexidine 4% Liquid 1 Application(s) Topical <User Schedule>  dextrose 5%. 1000 milliLiter(s) IV Continuous <Continuous>  furosemide    Tablet 20 milliGRAM(s) Oral daily  insulin glargine Injectable (LANTUS) 15 Unit(s) SubCutaneous at bedtime  insulin lispro Injectable (HumaLOG) 5 Unit(s) SubCutaneous before breakfast  insulin lispro Injectable (HumaLOG) 5 Unit(s) SubCutaneous before lunch  insulin lispro Injectable (HumaLOG) 5 Unit(s) SubCutaneous before dinner  losartan 25 milliGRAM(s) Oral daily  simvastatin 20 milliGRAM(s) Oral at bedtime    PRN MEDICATIONS  dextrose 40% Gel 15 Gram(s) Oral once PRN  glucagon  Injectable 1 milliGRAM(s) IntraMuscular once PRN    ------------------------------------------------------------------------------------------------------------

## 2018-10-08 LAB
ALBUMIN SERPL ELPH-MCNC: 3.8 G/DL — SIGNIFICANT CHANGE UP (ref 3.5–5.2)
ALP SERPL-CCNC: 152 U/L — HIGH (ref 30–115)
ALT FLD-CCNC: 17 U/L — SIGNIFICANT CHANGE UP (ref 0–41)
ANION GAP SERPL CALC-SCNC: 15 MMOL/L — HIGH (ref 7–14)
APTT BLD: 39.5 SEC — HIGH (ref 27–39.2)
APTT BLD: 40.8 SEC — HIGH (ref 27–39.2)
AST SERPL-CCNC: 26 U/L — SIGNIFICANT CHANGE UP (ref 0–41)
BILIRUB SERPL-MCNC: 1.5 MG/DL — HIGH (ref 0.2–1.2)
BUN SERPL-MCNC: 20 MG/DL — SIGNIFICANT CHANGE UP (ref 10–20)
CALCIUM SERPL-MCNC: 8.9 MG/DL — SIGNIFICANT CHANGE UP (ref 8.5–10.1)
CHLORIDE SERPL-SCNC: 101 MMOL/L — SIGNIFICANT CHANGE UP (ref 98–110)
CO2 SERPL-SCNC: 23 MMOL/L — SIGNIFICANT CHANGE UP (ref 17–32)
CREAT SERPL-MCNC: 0.8 MG/DL — SIGNIFICANT CHANGE UP (ref 0.7–1.5)
GLUCOSE BLDC GLUCOMTR-MCNC: 104 MG/DL — HIGH (ref 70–99)
GLUCOSE BLDC GLUCOMTR-MCNC: 112 MG/DL — HIGH (ref 70–99)
GLUCOSE BLDC GLUCOMTR-MCNC: 91 MG/DL — SIGNIFICANT CHANGE UP (ref 70–99)
GLUCOSE BLDC GLUCOMTR-MCNC: 96 MG/DL — SIGNIFICANT CHANGE UP (ref 70–99)
GLUCOSE SERPL-MCNC: 86 MG/DL — SIGNIFICANT CHANGE UP (ref 70–99)
HCT VFR BLD CALC: 41.2 % — LOW (ref 42–52)
HGB BLD-MCNC: 13.6 G/DL — LOW (ref 14–18)
INR BLD: 2.06 RATIO — HIGH (ref 0.65–1.3)
INR BLD: 2.15 RATIO — HIGH (ref 0.65–1.3)
MAGNESIUM SERPL-MCNC: 1.8 MG/DL — SIGNIFICANT CHANGE UP (ref 1.8–2.4)
MCHC RBC-ENTMCNC: 31.2 PG — HIGH (ref 27–31)
MCHC RBC-ENTMCNC: 33 G/DL — SIGNIFICANT CHANGE UP (ref 32–37)
MCV RBC AUTO: 94.5 FL — HIGH (ref 80–94)
NRBC # BLD: 0 /100 WBCS — SIGNIFICANT CHANGE UP (ref 0–0)
PLATELET # BLD AUTO: 146 K/UL — SIGNIFICANT CHANGE UP (ref 130–400)
POTASSIUM SERPL-MCNC: 4.2 MMOL/L — SIGNIFICANT CHANGE UP (ref 3.5–5)
POTASSIUM SERPL-SCNC: 4.2 MMOL/L — SIGNIFICANT CHANGE UP (ref 3.5–5)
PROT SERPL-MCNC: 6.6 G/DL — SIGNIFICANT CHANGE UP (ref 6–8)
PROTHROM AB SERPL-ACNC: 22.1 SEC — HIGH (ref 9.95–12.87)
PROTHROM AB SERPL-ACNC: 23 SEC — HIGH (ref 9.95–12.87)
RBC # BLD: 4.36 M/UL — LOW (ref 4.7–6.1)
RBC # FLD: 14.7 % — HIGH (ref 11.5–14.5)
SODIUM SERPL-SCNC: 139 MMOL/L — SIGNIFICANT CHANGE UP (ref 135–146)
TROPONIN T SERPL-MCNC: <0.01 NG/ML — SIGNIFICANT CHANGE UP
WBC # BLD: 10.92 K/UL — HIGH (ref 4.8–10.8)
WBC # FLD AUTO: 10.92 K/UL — HIGH (ref 4.8–10.8)

## 2018-10-08 RX ORDER — MAGNESIUM SULFATE 500 MG/ML
1 VIAL (ML) INJECTION ONCE
Qty: 0 | Refills: 0 | Status: COMPLETED | OUTPATIENT
Start: 2018-10-08 | End: 2018-10-08

## 2018-10-08 RX ORDER — INSULIN GLARGINE 100 [IU]/ML
7 INJECTION, SOLUTION SUBCUTANEOUS AT BEDTIME
Qty: 0 | Refills: 0 | Status: DISCONTINUED | OUTPATIENT
Start: 2018-10-08 | End: 2018-10-11

## 2018-10-08 RX ADMIN — Medication 5 UNIT(S): at 08:43

## 2018-10-08 RX ADMIN — LOSARTAN POTASSIUM 25 MILLIGRAM(S): 100 TABLET, FILM COATED ORAL at 05:27

## 2018-10-08 RX ADMIN — Medication 50 GRAM(S): at 18:19

## 2018-10-08 RX ADMIN — SIMVASTATIN 20 MILLIGRAM(S): 20 TABLET, FILM COATED ORAL at 21:17

## 2018-10-08 RX ADMIN — INSULIN GLARGINE 7 UNIT(S): 100 INJECTION, SOLUTION SUBCUTANEOUS at 21:17

## 2018-10-08 RX ADMIN — Medication 5 UNIT(S): at 17:31

## 2018-10-08 RX ADMIN — AMLODIPINE BESYLATE 5 MILLIGRAM(S): 2.5 TABLET ORAL at 05:26

## 2018-10-08 RX ADMIN — CHLORHEXIDINE GLUCONATE 1 APPLICATION(S): 213 SOLUTION TOPICAL at 05:27

## 2018-10-08 RX ADMIN — Medication 20 MILLIGRAM(S): at 05:27

## 2018-10-08 RX ADMIN — Medication 5 UNIT(S): at 12:37

## 2018-10-08 NOTE — PROGRESS NOTE ADULT - SUBJECTIVE AND OBJECTIVE BOX
SUBJECTIVE:    Patient is a 78y old Male who presents with a chief complaint of VT (07 Oct 2018 10:49)    Currently admitted to medicine with the primary diagnosis of  Vtach during his elective MELBA evaluating for severe MR    Today is hospital day 4d. This morning he is resting comfortably in bed and reports no new issues or overnight events.     PAST MEDICAL & SURGICAL HISTORY  Mitral regurgitation  Afib  Diabetes  Hypertension  MI, old  CAD (coronary artery disease)  S/P subdural hematoma evacuation  S/P CABG x 1       ALLERGIES:  Biaxin (Stomach Upset)    MEDICATIONS:  STANDING MEDICATIONS  amLODIPine   Tablet 5 milliGRAM(s) Oral daily  chlorhexidine 4% Liquid 1 Application(s) Topical <User Schedule>  dextrose 5%. 1000 milliLiter(s) IV Continuous <Continuous>  dextrose 50% Injectable 12.5 Gram(s) IV Push once  dextrose 50% Injectable 25 Gram(s) IV Push once  dextrose 50% Injectable 25 Gram(s) IV Push once  furosemide    Tablet 20 milliGRAM(s) Oral daily  insulin glargine Injectable (LANTUS) 15 Unit(s) SubCutaneous at bedtime  insulin lispro Injectable (HumaLOG) 5 Unit(s) SubCutaneous before breakfast  insulin lispro Injectable (HumaLOG) 5 Unit(s) SubCutaneous before lunch  insulin lispro Injectable (HumaLOG) 5 Unit(s) SubCutaneous before dinner  losartan 25 milliGRAM(s) Oral daily  simvastatin 20 milliGRAM(s) Oral at bedtime    PRN MEDICATIONS  dextrose 40% Gel 15 Gram(s) Oral once PRN  glucagon  Injectable 1 milliGRAM(s) IntraMuscular once PRN    VITALS:   T(F): 99  HR: 62  BP: 119/68  RR: 20  SpO2: 99%    LABS:                        13.6   10.92 )-----------( 146      ( 08 Oct 2018 04:45 )             41.2     10-08    139  |  101  |  20  ----------------------------<  86  4.2   |  23  |  0.8    Ca    8.9      08 Oct 2018 04:45  Mg     1.8     10-08    TPro  6.6  /  Alb  3.8  /  TBili  1.5<H>  /  DBili  x   /  AST  26  /  ALT  17  /  AlkPhos  152<H>  10-08    PT/INR - ( 08 Oct 2018 04:45 )   PT: 22.10 sec;   INR: 2.06 ratio         PTT - ( 08 Oct 2018 04:45 )  PTT:39.5 sec      Troponin T, Serum: <0.01 ng/mL (10-08-18 @ 04:45)      CARDIAC MARKERS ( 08 Oct 2018 04:45 )  x     / <0.01 ng/mL / x     / x     / x      CARDIAC MARKERS ( 07 Oct 2018 05:00 )  x     / <0.01 ng/mL / x     / x     / x          RADIOLOGY:  < from: MELBA w/Probe Placement (10.04.18 @ 11:21) >   1. Left ventricular ejection fraction, by visual estimation, is 45 to   50%.   2. Mildly decreased global left ventricular systolic function.   3. Basal and mid inferior wall is abnormal as described above.   4. Mildlyincreased LV wall thickness.   5. IAS intact wasnt evaluated for shunt.   6. Mildly enlarged left atrium.   7. Degenerative mitral valve.   8. Moderate to severe mitral annular calcification.   9. Severe mitral valve regurgitation.  10. Thickening and calcification of the anterior and posterior mitral   valve leaflets.  11. MVA from subgastric views by palnimetry is 4.0 Vena contracta is .73   on image 6,l MV coapt appears to be .4 mm on image 21, posterior leaflet   length appears to be between 11 and 14 cm, Tented MV is .93cm ERO of .58   with a RV of 58cc allc/w severe MR.  12. Moderate-severe tricuspid regurgitation.  13. PSAP at least 60.  14. No evidence of aortic stenosis.  15. No left atrial appendage thrombus and decreased left atrial appendage   velocities.  16. Simple atheroma seen in the aortic arch.    < end of copied text >      PHYSICAL EXAM:  GEN: No acute distress  LUNGS: Clear to auscultation bilaterally   HEART: S1/S2 present. Irregular Irregular rate and rhythm  ABD: Soft, non-tender, non-distended. Bowel sounds present  EXT: NC/NC/NE/2+PP/MARQUEZ/Skin Intact.   NEURO: AAOX3    Intravenous access:   NG tube:   Arcos cathter:

## 2018-10-08 NOTE — PROGRESS NOTE ADULT - ASSESSMENT
This is an 78 year old male who presented for elective MELBA for further evaluation of his MR. During the MELBA procedure, patient developed sustained VTach x2 with stable BP. He received in total two shocks of 100 joules, one dose of IV lidocaine, and 150 mg of IV amiodarone. Since, the IV amiodarone drip has been discontinued. Admitted to CCU for further cardiac monitoring.    Ventricular Arrhythmia during MELBA  - If reoccurs, give lidocaine bolus, then 1mg/min    Mitral regurgitation, Severe  - Cardiac cath Tuesday; NPO at midnight  - Holding coumadin until INR < 2.0, start heparin afterwards  -INR 2.06  - AICD if no stent placement needed  - CE negative    Atrial fibrallation  - Holding coumadin as above  - No amio as QTC prolonged, on BB at home being held due to risk of bradyarrythmia    Diabetes Mellitus  -Basal/Bolus insulin with SS 15/5  - stable FS glucose    HTN/HLD  Lasix 20mg qd  Amlodipine 5mg qd  Losartan 25mg qd  Simvastatin 20mg at bedtime  _________________________________________  Fluid: PO intake  Nutrition: Carb Consistent  Electrolytes:      Sodium - NL      Potassium - NL      Bicarb - NL      Chloride - NL      Magnesium - NL      Phosphorus - NL      GI PPX:                                   (X) Not indicated;  () Pantoprazole 40mg Daily    DVT PPX:  () Not indicated;  () Heparin 5000 mg SubQ;  () Lovenox 40 mg SubQ;  () SCDs    ACTIVITY:  () Ad Shazia  /  (X) Increase as Tolerated  /  () OOB w/ assist  /  () Bed Rest    BMI:  Height (cm): 177.8 (10-04)  Weight (kg): 78.925 (10-04)  BMI (kg/m2): 25 (10-04)    DISPO:  Patient to be discharged when condition(s) optimized.    (X) Discussion with patient and/or family regarding goals of care.  (X) Discussed Case and Plan with the Medical Attending.    |::::::::::::::::::::::::::::| CODE STATUS |::::::::::::::::::::::::::::|        (X) FULL     ~     () DNR     ~     () DNI  |:::::::::::::::::::::::::::::::::::::::::::::::::::::::::::::::::::::::::::::::::::::::::|    ------------------------------------------------------------------------------------------------------------  Please call Dr. Yi (PGY-1) with any questions/consult recs: [facility]ra #[phone] This is an 78 year old male who presented for elective MELBA for further evaluation of his MR. During the MELBA procedure, patient developed sustained VTach x2 with stable BP. He received in total two shocks of 100 joules, one dose of IV lidocaine, and 150 mg of IV amiodarone. Since, the IV amiodarone drip has been discontinued. Admitted to CCU for further cardiac monitoring.    Ventricular Arrhythmia during MELBA  - If reoccurs, give lidocaine bolus 50mg IV, then 1mg/min    Mitral regurgitation, Severe  - Cardiac cath Tuesday; NPO at midnight  - Holding coumadin until INR < 2.0, start heparin afterwards  -INR 2.06  - AICD if no stent placement needed  - CE negative    Atrial fibrallation  - Holding coumadin as above  - No amio as QTC prolonged, on BB at home being held due to risk of bradyarrythmia    Diabetes Mellitus  -Basal/Bolus insulin with SS 15/5  - stable FS glucose    HTN/HLD  Lasix 20mg qd  Amlodipine 5mg qd  Losartan 25mg qd  Simvastatin 20mg at bedtime  _________________________________________  Fluid: PO intake  Nutrition: Carb Consistent  Electrolytes:      Sodium - NL      Potassium - NL      Bicarb - NL      Chloride - NL      Magnesium - NL      Phosphorus - NL      GI PPX:                                   (X) Not indicated;  () Pantoprazole 40mg Daily    DVT PPX:  () Not indicated;  () Heparin 5000 mg SubQ;  () Lovenox 40 mg SubQ;  () SCDs    ACTIVITY:  () Ad Shazia  /  (X) Increase as Tolerated  /  () OOB w/ assist  /  () Bed Rest    BMI:  Height (cm): 177.8 (10-04)  Weight (kg): 78.925 (10-04)  BMI (kg/m2): 25 (10-04)    DISPO:  Patient to be discharged when condition(s) optimized.    (X) Discussion with patient and/or family regarding goals of care.  (X) Discussed Case and Plan with the Medical Attending.    |::::::::::::::::::::::::::::| CODE STATUS |::::::::::::::::::::::::::::|        (X) FULL     ~     () DNR     ~     () DNI  |:::::::::::::::::::::::::::::::::::::::::::::::::::::::::::::::::::::::::::::::::::::::::|    ------------------------------------------------------------------------------------------------------------  Please call Dr. Yi (PGY-1) with any questions/consult recs: [facility] #[phone]

## 2018-10-09 LAB
ALBUMIN SERPL ELPH-MCNC: 3.7 G/DL — SIGNIFICANT CHANGE UP (ref 3.5–5.2)
ALP SERPL-CCNC: 143 U/L — HIGH (ref 30–115)
ALT FLD-CCNC: 16 U/L — SIGNIFICANT CHANGE UP (ref 0–41)
ANION GAP SERPL CALC-SCNC: 14 MMOL/L — SIGNIFICANT CHANGE UP (ref 7–14)
APTT BLD: 35.7 SEC — SIGNIFICANT CHANGE UP (ref 27–39.2)
APTT BLD: 37.8 SEC — SIGNIFICANT CHANGE UP (ref 27–39.2)
AST SERPL-CCNC: 24 U/L — SIGNIFICANT CHANGE UP (ref 0–41)
BILIRUB SERPL-MCNC: 2.2 MG/DL — HIGH (ref 0.2–1.2)
BLD GP AB SCN SERPL QL: SIGNIFICANT CHANGE UP
BUN SERPL-MCNC: 19 MG/DL — SIGNIFICANT CHANGE UP (ref 10–20)
CALCIUM SERPL-MCNC: 8.9 MG/DL — SIGNIFICANT CHANGE UP (ref 8.5–10.1)
CHLORIDE SERPL-SCNC: 97 MMOL/L — LOW (ref 98–110)
CO2 SERPL-SCNC: 23 MMOL/L — SIGNIFICANT CHANGE UP (ref 17–32)
CREAT SERPL-MCNC: 0.7 MG/DL — SIGNIFICANT CHANGE UP (ref 0.7–1.5)
GGT SERPL-CCNC: 131 U/L — HIGH (ref 1–40)
GLUCOSE BLDC GLUCOMTR-MCNC: 101 MG/DL — HIGH (ref 70–99)
GLUCOSE BLDC GLUCOMTR-MCNC: 105 MG/DL — HIGH (ref 70–99)
GLUCOSE BLDC GLUCOMTR-MCNC: 115 MG/DL — HIGH (ref 70–99)
GLUCOSE BLDC GLUCOMTR-MCNC: 124 MG/DL — HIGH (ref 70–99)
GLUCOSE SERPL-MCNC: 85 MG/DL — SIGNIFICANT CHANGE UP (ref 70–99)
HCT VFR BLD CALC: 39.6 % — LOW (ref 42–52)
HGB BLD-MCNC: 13.5 G/DL — LOW (ref 14–18)
INR BLD: 1.77 RATIO — HIGH (ref 0.65–1.3)
INR BLD: 1.84 RATIO — HIGH (ref 0.65–1.3)
INR BLD: 2.07 RATIO — HIGH (ref 0.65–1.3)
MAGNESIUM SERPL-MCNC: 2 MG/DL — SIGNIFICANT CHANGE UP (ref 1.8–2.4)
MCHC RBC-ENTMCNC: 31.9 PG — HIGH (ref 27–31)
MCHC RBC-ENTMCNC: 34.1 G/DL — SIGNIFICANT CHANGE UP (ref 32–37)
MCV RBC AUTO: 93.6 FL — SIGNIFICANT CHANGE UP (ref 80–94)
NRBC # BLD: 0 /100 WBCS — SIGNIFICANT CHANGE UP (ref 0–0)
PLATELET # BLD AUTO: 162 K/UL — SIGNIFICANT CHANGE UP (ref 130–400)
POTASSIUM SERPL-MCNC: 4.5 MMOL/L — SIGNIFICANT CHANGE UP (ref 3.5–5)
POTASSIUM SERPL-SCNC: 4.5 MMOL/L — SIGNIFICANT CHANGE UP (ref 3.5–5)
PROT SERPL-MCNC: 6.5 G/DL — SIGNIFICANT CHANGE UP (ref 6–8)
PROTHROM AB SERPL-ACNC: 19 SEC — HIGH (ref 9.95–12.87)
PROTHROM AB SERPL-ACNC: 19.7 SEC — HIGH (ref 9.95–12.87)
PROTHROM AB SERPL-ACNC: 22.2 SEC — HIGH (ref 9.95–12.87)
RBC # BLD: 4.23 M/UL — LOW (ref 4.7–6.1)
RBC # FLD: 14.7 % — HIGH (ref 11.5–14.5)
SODIUM SERPL-SCNC: 134 MMOL/L — LOW (ref 135–146)
TROPONIN T SERPL-MCNC: <0.01 NG/ML — SIGNIFICANT CHANGE UP
TYPE + AB SCN PNL BLD: SIGNIFICANT CHANGE UP
WBC # BLD: 10.26 K/UL — SIGNIFICANT CHANGE UP (ref 4.8–10.8)
WBC # FLD AUTO: 10.26 K/UL — SIGNIFICANT CHANGE UP (ref 4.8–10.8)

## 2018-10-09 RX ORDER — METOPROLOL TARTRATE 50 MG
12.5 TABLET ORAL DAILY
Qty: 0 | Refills: 0 | Status: DISCONTINUED | OUTPATIENT
Start: 2018-10-09 | End: 2018-10-11

## 2018-10-09 RX ORDER — METOPROLOL TARTRATE 50 MG
25 TABLET ORAL
Qty: 0 | Refills: 0 | Status: DISCONTINUED | OUTPATIENT
Start: 2018-10-09 | End: 2018-10-09

## 2018-10-09 RX ORDER — SODIUM CHLORIDE 9 MG/ML
1000 INJECTION INTRAMUSCULAR; INTRAVENOUS; SUBCUTANEOUS
Qty: 0 | Refills: 0 | Status: DISCONTINUED | OUTPATIENT
Start: 2018-10-09 | End: 2018-10-10

## 2018-10-09 RX ORDER — METOPROLOL TARTRATE 50 MG
12 TABLET ORAL DAILY
Qty: 0 | Refills: 0 | Status: DISCONTINUED | OUTPATIENT
Start: 2018-10-09 | End: 2018-10-09

## 2018-10-09 RX ORDER — IBUPROFEN 200 MG
400 TABLET ORAL ONCE
Qty: 0 | Refills: 0 | Status: DISCONTINUED | OUTPATIENT
Start: 2018-10-09 | End: 2018-10-09

## 2018-10-09 RX ORDER — IBUPROFEN 200 MG
400 TABLET ORAL
Qty: 0 | Refills: 0 | Status: DISCONTINUED | OUTPATIENT
Start: 2018-10-09 | End: 2018-10-11

## 2018-10-09 RX ADMIN — Medication 5 UNIT(S): at 12:10

## 2018-10-09 RX ADMIN — Medication 400 MILLIGRAM(S): at 20:45

## 2018-10-09 RX ADMIN — AMLODIPINE BESYLATE 5 MILLIGRAM(S): 2.5 TABLET ORAL at 05:41

## 2018-10-09 RX ADMIN — Medication 20 MILLIGRAM(S): at 05:41

## 2018-10-09 RX ADMIN — SIMVASTATIN 20 MILLIGRAM(S): 20 TABLET, FILM COATED ORAL at 21:17

## 2018-10-09 RX ADMIN — LOSARTAN POTASSIUM 25 MILLIGRAM(S): 100 TABLET, FILM COATED ORAL at 05:41

## 2018-10-09 RX ADMIN — SODIUM CHLORIDE 100 MILLILITER(S): 9 INJECTION INTRAMUSCULAR; INTRAVENOUS; SUBCUTANEOUS at 10:07

## 2018-10-09 RX ADMIN — Medication 400 MILLIGRAM(S): at 20:00

## 2018-10-09 RX ADMIN — INSULIN GLARGINE 7 UNIT(S): 100 INJECTION, SOLUTION SUBCUTANEOUS at 21:17

## 2018-10-09 NOTE — PROGRESS NOTE ADULT - ASSESSMENT
This is an 78 year old male who presented for elective MELBA for further evaluation of his MR. During the MELBA procedure, patient developed sustained VTach x2 with stable BP. He received in total two shocks of 100 joules, one dose of IV lidocaine, and 150 mg of IV amiodarone. Since, the IV amiodarone drip has been discontinued. Admitted to CCU for further cardiac monitoring.    Ventricular Arrhythmia during MELBA  - If reoccurs, give lidocaine bolus 50mg IV, then 1mg/min    Mitral regurgitation, Severe  - Cardiac cath Tuesday; NPO at midnight  - Holding coumadin until INR < 2.0, start heparin afterwards  -INR 2.06  - AICD if no stent placement needed  - CE negative    Atrial fibrallation  - Holding coumadin as above  - No amio as QTC prolonged, on BB at home being held due to risk of bradyarrythmia    Diabetes Mellitus  -Basal/Bolus insulin with SS 15/5  - stable FS glucose    HTN/HLD  Lasix 20mg qd  Amlodipine 5mg qd  Losartan 25mg qd  Simvastatin 20mg at bedtime  _________________________________________  Fluid: PO intake  Nutrition: Carb Consistent  Electrolytes:      Sodium - NL      Potassium - NL      Bicarb - NL      Chloride - NL      Magnesium - NL      Phosphorus - NL      GI PPX:                                   (X) Not indicated;  () Pantoprazole 40mg Daily    DVT PPX:  () Not indicated;  () Heparin 5000 mg SubQ;  () Lovenox 40 mg SubQ;  () SCDs    ACTIVITY:  () Ad Shazia  /  (X) Increase as Tolerated  /  () OOB w/ assist  /  () Bed Rest    BMI:  Height (cm): 177.8 (10-04)  Weight (kg): 78.925 (10-04)  BMI (kg/m2): 25 (10-04)    DISPO:  Patient to be discharged when condition(s) optimized.    (X) Discussion with patient and/or family regarding goals of care.  (X) Discussed Case and Plan with the Medical Attending.    |::::::::::::::::::::::::::::| CODE STATUS |::::::::::::::::::::::::::::|        (X) FULL     ~     () DNR     ~     () DNI  |:::::::::::::::::::::::::::::::::::::::::::::::::::::::::::::::::::::::::::::::::::::::::|    ------------------------------------------------------------------------------------------------------------  Please call Dr. Yi ([id number]) with any questions/consult recs: [facility]ra #[phone] This is an 78 year old male who presented for elective MELBA for further evaluation of his MR. During the MELBA procedure, patient developed sustained VTach x2 with stable BP. He received in total two shocks of 100 joules, one dose of IV lidocaine, and 150 mg of IV amiodarone. Since, the IV amiodarone drip has been discontinued. Admitted to CCU for further cardiac monitoring.    Ventricular Arrhythmia during MELBA  - If reoccurs, give lidocaine bolus 50mg IV, then 1mg/min    Mitral regurgitation, Severe  - s/p Cardiac Cath, will follow up report  -Restart Coumadin?  -INR 2.06  - AICD/Lifevest if no stent placement needed per Cardio  - CE negative    Atrial fibrallation  - Holding coumadin as above  - No amio as QTC prolonged, on BB at home being held due to risk of bradyarrythmia    R Knee Gout  Patient has hx of gout takes colchicine at home will consider restarting    Diabetes Mellitus  -Basal/Bolus insulin with SS 15/5  - stable FS glucose    HTN/HLD  Lasix 20mg qd  Amlodipine 5mg qd  Losartan 25mg qd  Simvastatin 20mg at bedtime  _________________________________________  Fluid: PO intake  Nutrition: Carb Consistent  Electrolytes:      Sodium - NL      Potassium - NL      Bicarb - NL      Chloride - NL      Magnesium - NL      Phosphorus - NL      GI PPX:                                   (X) Not indicated;  () Pantoprazole 40mg Daily    DVT PPX:  () Not indicated;  () Heparin 5000 mg SubQ;  () Lovenox 40 mg SubQ;  () SCDs    ACTIVITY:  () Ad Shazia  /  (X) Increase as Tolerated  /  () OOB w/ assist  /  () Bed Rest    BMI:  Height (cm): 177.8 (10-04)  Weight (kg): 78.925 (10-04)  BMI (kg/m2): 25 (10-04)    DISPO:  Patient to be discharged when condition(s) optimized.    (X) Discussion with patient and/or family regarding goals of care.  (X) Discussed Case and Plan with the Medical Attending.    |::::::::::::::::::::::::::::| CODE STATUS |::::::::::::::::::::::::::::|        (X) FULL     ~     () DNR     ~     () DNI  |:::::::::::::::::::::::::::::::::::::::::::::::::::::::::::::::::::::::::::::::::::::::::|    ------------------------------------------------------------------------------------------------------------ This is an 78 year old male who presented for elective MELBA for further evaluation of his MR. During the MELBA procedure, patient developed sustained VTach x2 with stable BP. He received in total two shocks of 100 joules, one dose of IV lidocaine, and 150 mg of IV amiodarone. Since, the IV amiodarone drip has been discontinued. Admitted to CCU for further cardiac monitoring.    Ventricular Arrhythmia during MELBA  - If reoccurs, give lidocaine bolus 50mg IV, then 1mg/min    Mitral regurgitation, Severe  - s/p Cardiac Cath, will follow up report  -Restart Coumadin?  -INR 2.06  - AICD/Lifevest if no stent placement needed per Cardio  - CE negative    Atrial fibrallation  - Holding coumadin as above  - No amio as QTC prolonged, on BB at home being held due to risk of bradyarrythmia    R Knee Gout  Patient has hx of gout takes colchicine at home will consider restarting    Elevated T-Bili  RUQ Sono  Hepatic function tests    Diabetes Mellitus  -Basal/Bolus insulin with SS 15/5  - stable FS glucose    HTN/HLD  Lasix 20mg qd  Amlodipine 5mg qd  Losartan 25mg qd  Simvastatin 20mg at bedtime  _________________________________________  Fluid: PO intake  Nutrition: Carb Consistent  Electrolytes:      Sodium - NL      Potassium - NL      Bicarb - NL      Chloride - NL      Magnesium - NL      Phosphorus - NL      GI PPX:                                   (X) Not indicated;  () Pantoprazole 40mg Daily    DVT PPX:  () Not indicated;  () Heparin 5000 mg SubQ;  () Lovenox 40 mg SubQ;  () SCDs    ACTIVITY:  () Ad Shazia  /  (X) Increase as Tolerated  /  () OOB w/ assist  /  () Bed Rest    BMI:  Height (cm): 177.8 (10-04)  Weight (kg): 78.925 (10-04)  BMI (kg/m2): 25 (10-04)    DISPO:  Patient to be discharged when condition(s) optimized.    (X) Discussion with patient and/or family regarding goals of care.  (X) Discussed Case and Plan with the Medical Attending.    |::::::::::::::::::::::::::::| CODE STATUS |::::::::::::::::::::::::::::|        (X) FULL     ~     () DNR     ~     () DNI  |:::::::::::::::::::::::::::::::::::::::::::::::::::::::::::::::::::::::::::::::::::::::::|    ------------------------------------------------------------------------------------------------------------ This is an 78 year old male who presented for elective MELBA for further evaluation of his MR. During the MELBA procedure, patient developed sustained VTach x2 with stable BP. He received in total two shocks of 100 joules, one dose of IV lidocaine, and 150 mg of IV amiodarone. Since, the IV amiodarone drip has been discontinued. Admitted to CCU for further cardiac monitoring.    Ventricular Arrhythmia during MELBA  - If reoccurs, give lidocaine bolus 50mg IV, then 1mg/min    Mitral regurgitation, Severe  - s/p Cardiac Cath, will follow up report  -Restart Coumadin?  -INR 2.06  - AICD/Lifevest if no stent placement needed per Cardio  - CE negative    Atrial fibrallation  - Holding coumadin as above  - No amio as QTC prolonged, on BB at home being held due to risk of bradyarrythmia    R Knee Gout  Patient has hx of gout takes colchicine at home will consider restarting  Ibuprofen 400mg x1    Elevated T-Bili  RUQ Sono  Hepatic function tests    Diabetes Mellitus  -Basal/Bolus insulin with SS 15/5  - stable FS glucose    HTN/HLD  Lasix 20mg qd  Amlodipine 5mg qd  Losartan 25mg qd  Simvastatin 20mg at bedtime  _________________________________________  Fluid: PO intake  Nutrition: Carb Consistent  Electrolytes:      Sodium - NL      Potassium - NL      Bicarb - NL      Chloride - NL      Magnesium - NL      Phosphorus - NL      GI PPX:                                   (X) Not indicated;  () Pantoprazole 40mg Daily    DVT PPX:  () Not indicated;  () Heparin 5000 mg SubQ;  () Lovenox 40 mg SubQ;  () SCDs    ACTIVITY:  () Ad Shazia  /  (X) Increase as Tolerated  /  () OOB w/ assist  /  () Bed Rest    BMI:  Height (cm): 177.8 (10-04)  Weight (kg): 78.925 (10-04)  BMI (kg/m2): 25 (10-04)    DISPO:  Patient to be discharged when condition(s) optimized.    (X) Discussion with patient and/or family regarding goals of care.  (X) Discussed Case and Plan with the Medical Attending.    |::::::::::::::::::::::::::::| CODE STATUS |::::::::::::::::::::::::::::|        (X) FULL     ~     () DNR     ~     () DNI  |:::::::::::::::::::::::::::::::::::::::::::::::::::::::::::::::::::::::::::::::::::::::::|    ------------------------------------------------------------------------------------------------------------ This is an 78 year old male who presented for elective MELBA for further evaluation of his MR. During the MELBA procedure, patient developed sustained VTach x2 with stable BP. He received in total two shocks of 100 joules, one dose of IV lidocaine, and 150 mg of IV amiodarone. Since, the IV amiodarone drip has been discontinued. Admitted to CCU for further cardiac monitoring.    Ventricular Arrhythmia during MELBA  - If reoccurs, give lidocaine bolus 50mg IV, then 1mg/min  -Will need AICD, EP consult placed    Mitral regurgitation, Severe  - s/p Cardiac Cath, no stents  - Coumadin, per Caroline wait on restarting because will need AICD  - Bridge with Lovenox if AICD not done tomorrow  LVEF%: 35%  Normal Coronary Arteries  Luminal Irregularities  Non-obstructive CAD  -INR 1.77  - CE negative    Atrial fibrallation  - Holding coumadin as above  -Metoprolol XL 12.5 QD  - No amio as QTC prolonged    R Knee Gout  Patient has hx of gout takes colchicine at home prn not in med rec consider restarting?  Ibuprofen 400mg BID    Elevated T-Bili  RUQ Sono  Hepatic function tests    Diabetes Mellitus  -Basal/Bolus insulin with SS 15/5  - stable FS glucose    HTN/HLD  Lasix 20mg qd  Metoprolol XL 12.5 QD  Amlodipine 5mg qd  Losartan 25mg qd  Simvastatin 20mg at bedtime  _________________________________________  Fluid: PO intake  Nutrition: Carb Consistent  Electrolytes:      Sodium - NL      Potassium - NL      Bicarb - NL      Chloride - NL      Magnesium - NL      Phosphorus - NL      GI PPX:                                   (X) Not indicated;  () Pantoprazole 40mg Daily    DVT PPX:  () Not indicated;  () Heparin 5000 mg SubQ;  () Lovenox 40 mg SubQ;  (X) SCDs    ACTIVITY:  () Ad Shazia  /  (X) Increase as Tolerated  /  () OOB w/ assist  /  () Bed Rest    BMI:  Height (cm): 177.8 (10-04)  Weight (kg): 78.925 (10-04)  BMI (kg/m2): 25 (10-04)    DISPO:  Patient to be discharged when condition(s) optimized.    (X) Discussion with patient and/or family regarding goals of care.  (X) Discussed Case and Plan with the Medical Attending.    |::::::::::::::::::::::::::::| CODE STATUS |::::::::::::::::::::::::::::|        (X) FULL     ~     () DNR     ~     () DNI  |:::::::::::::::::::::::::::::::::::::::::::::::::::::::::::::::::::::::::::::::::::::::::|    ------------------------------------------------------------------------------------------------------------

## 2018-10-09 NOTE — CHART NOTE - NSCHARTNOTEFT_GEN_A_CORE
PRE-OP DIAGNOSIS: CAD    PROCEDURE: Left heart catheterization    Physician: Dr. Noman Velazquez M.D.  Assistant: Dr. Rendon    ANESTHESIA TYPE:  [  ]General Anesthesia  [ x ] Sedation  [ x ] Local/Regional    ESTIMATED BLOOD LOSS:   <10  mL    CONDITION  [  ] Critical  [  ] Serious  [  ]Fair  [ x ]Good      SPECIMENS REMOVED (IF APPLICABLE): n/a      IV CONTRAST:             mL      IMPLANTS (IF APPLICABLE) None      FINDINGS    Left Heart Catheterization:  LVEF%: 35%  LVEDP:  [ ] Normal Coronary Arteries  [ ] Luminal Irregularities  [ ] Non-obstructive CAD      LEFT HEART CATHERIZATION                                    Left main Mild disease    LAD:         Mid 100%               Diag:    Left Circumflex: mid 90%  OM: Occluded        Right Cornary Artery: Mid 100%  RPDA  RPL    LIMA to LAD - patent.  SVG to OM - occluded  SVG to RPDA - thrombosed    INTERVENTION  IMPLANTS:          POST-OP DIAGNOSIS    Same.      PLAN OF CARE  [ ] D/C Home today  [ ]  D/C in AM  [x ] Return to In-patient bed  [ ] Admit for observation  [ ] Return for staged procedure:  [ ] CT Surgery consult called  [ ]  Continue medical therapy

## 2018-10-09 NOTE — PROGRESS NOTE ADULT - SUBJECTIVE AND OBJECTIVE BOX
SUBJECTIVE:    Patient is a 78y old Male who presents with a chief complaint of VT (07 Oct 2018 10:49)    Currently admitted to medicine with the primary diagnosis of  Vtach during his elective MELBA evaluating for severe MR    Today is hospital day 5d. This morning he is resting comfortably in bed and reports no new issues or overnight events. Patient will go for Cath today.    PAST MEDICAL & SURGICAL HISTORY  Mitral regurgitation  Afib  Diabetes  Hypertension  MI, old  CAD (coronary artery disease)  S/P subdural hematoma evacuation  S/P CABG x 1    ALLERGIES:  Biaxin (Stomach Upset)    MEDICATIONS:  STANDING MEDICATIONS  amLODIPine   Tablet 5 milliGRAM(s) Oral daily  chlorhexidine 4% Liquid 1 Application(s) Topical <User Schedule>  dextrose 50% Injectable 12.5 Gram(s) IV Push once  dextrose 50% Injectable 25 Gram(s) IV Push once  dextrose 50% Injectable 25 Gram(s) IV Push once  furosemide    Tablet 20 milliGRAM(s) Oral daily  insulin glargine Injectable (LANTUS) 7 Unit(s) SubCutaneous at bedtime  insulin lispro Injectable (HumaLOG) 5 Unit(s) SubCutaneous before breakfast  insulin lispro Injectable (HumaLOG) 5 Unit(s) SubCutaneous before lunch  insulin lispro Injectable (HumaLOG) 5 Unit(s) SubCutaneous before dinner  losartan 25 milliGRAM(s) Oral daily  simvastatin 20 milliGRAM(s) Oral at bedtime  sodium chloride 0.9%. 1000 milliLiter(s) IV Continuous <Continuous>    PRN MEDICATIONS  dextrose 40% Gel 15 Gram(s) Oral once PRN  glucagon  Injectable 1 milliGRAM(s) IntraMuscular once PRN    VITALS:   T(F): 97.7  HR: 64  BP: 137/59  RR: 18  SpO2: 97%    LABS:                        13.5   10.26 )-----------( 162      ( 09 Oct 2018 04:29 )             39.6     10-09    134<L>  |  97<L>  |  19  ----------------------------<  85  4.5   |  23  |  0.7    Ca    8.9      09 Oct 2018 04:29  Mg     2.0     10-09    TPro  6.5  /  Alb  3.7  /  TBili  2.2<H>  /  DBili  x   /  AST  24  /  ALT  16  /  AlkPhos  143<H>  10-09    PT/INR - ( 09 Oct 2018 13:12 )   PT: 19.00 sec;   INR: 1.77 ratio       PTT - ( 09 Oct 2018 04:29 )  PTT:35.7 sec    Troponin T, Serum: <0.01 ng/mL (10-09-18 @ 04:29)      CARDIAC MARKERS ( 09 Oct 2018 04:29 )  x     / <0.01 ng/mL / x     / x     / x      CARDIAC MARKERS ( 08 Oct 2018 04:45 )  x     / <0.01 ng/mL / x     / x     / x          RADIOLOGY:  < from: MELBA w/Probe Placement (10.04.18 @ 11:21) >   1. Left ventricular ejection fraction, by visual estimation, is 45 to   50%.   2. Mildly decreased global left ventricular systolic function.   3. Basal and mid inferior wall is abnormal as described above.   4. Mildlyincreased LV wall thickness.   5. IAS intact wasnt evaluated for shunt.   6. Mildly enlarged left atrium.     7. Degenerative mitral valve.   8. Moderate to severe mitral annular calcification.   9. Severe mitral valve regurgitation.  10. Thickening and calcification of the anterior and posterior mitral   valve leaflets.  11. MVA from subgastric views by palnimetry is 4.0 Vena contracta is .73   on image 6,l MV coapt appears to be .4 mm on image 21, posterior leaflet   length appears to be between 11 and 14 cm, Tented MV is .93cm ERO of .58   with a RV of 58cc allc/w severe MR.  12. Moderate-severe tricuspid regurgitation.  13. PSAP at least 60.  14. No evidence of aortic stenosis.  15. No left atrial appendage thrombus and decreased left atrial appendage   velocities.  16. Simple atheroma seen in the aortic arch.      PHYSICAL EXAM:  GEN: No acute distress  LUNGS: Clear to auscultation bilaterally   HEART: S1/S2 present. Irregular Irregular rate and rhythm  ABD: Soft, non-tender, non-distended. Bowel sounds present  EXT: NC/NC/NE/2+PP/MARQUEZ/Skin Intact.   NEURO: AAOX3      Intravenous access:   NG tube:   Arcos cathter: SUBJECTIVE:    Patient is a 78y old Male who presents with a chief complaint of VT (07 Oct 2018 10:49)    Currently admitted to medicine with the primary diagnosis of  Vtach during his elective MELBA evaluating for severe MR    Today is hospital day 5d. This morning he is resting comfortably in bed and reports no new issues or overnight events. Patient is s/p Left heart Cath, no dizziness, weakness, cp sob,    PAST MEDICAL & SURGICAL HISTORY  Mitral regurgitation  Afib  Diabetes  Hypertension  MI, old  CAD (coronary artery disease)  S/P subdural hematoma evacuation  S/P CABG x 1    ALLERGIES:  Biaxin (Stomach Upset)    MEDICATIONS:  STANDING MEDICATIONS  amLODIPine   Tablet 5 milliGRAM(s) Oral daily  chlorhexidine 4% Liquid 1 Application(s) Topical <User Schedule>  dextrose 50% Injectable 12.5 Gram(s) IV Push once  dextrose 50% Injectable 25 Gram(s) IV Push once  dextrose 50% Injectable 25 Gram(s) IV Push once  furosemide    Tablet 20 milliGRAM(s) Oral daily  insulin glargine Injectable (LANTUS) 7 Unit(s) SubCutaneous at bedtime  insulin lispro Injectable (HumaLOG) 5 Unit(s) SubCutaneous before breakfast  insulin lispro Injectable (HumaLOG) 5 Unit(s) SubCutaneous before lunch  insulin lispro Injectable (HumaLOG) 5 Unit(s) SubCutaneous before dinner  losartan 25 milliGRAM(s) Oral daily  simvastatin 20 milliGRAM(s) Oral at bedtime  sodium chloride 0.9%. 1000 milliLiter(s) IV Continuous <Continuous>    PRN MEDICATIONS  dextrose 40% Gel 15 Gram(s) Oral once PRN  glucagon  Injectable 1 milliGRAM(s) IntraMuscular once PRN    VITALS:   T(F): 97.7  HR: 64  BP: 137/59  RR: 18  SpO2: 97%    LABS:                        13.5   10.26 )-----------( 162      ( 09 Oct 2018 04:29 )             39.6     10-09    134<L>  |  97<L>  |  19  ----------------------------<  85  4.5   |  23  |  0.7    Ca    8.9      09 Oct 2018 04:29  Mg     2.0     10-09    TPro  6.5  /  Alb  3.7  /  TBili  2.2<H>  /  DBili  x   /  AST  24  /  ALT  16  /  AlkPhos  143<H>  10-09    PT/INR - ( 09 Oct 2018 13:12 )   PT: 19.00 sec;   INR: 1.77 ratio       PTT - ( 09 Oct 2018 04:29 )  PTT:35.7 sec    Troponin T, Serum: <0.01 ng/mL (10-09-18 @ 04:29)      CARDIAC MARKERS ( 09 Oct 2018 04:29 )  x     / <0.01 ng/mL / x     / x     / x      CARDIAC MARKERS ( 08 Oct 2018 04:45 )  x     / <0.01 ng/mL / x     / x     / x          RADIOLOGY:  < from: MELBA w/Probe Placement (10.04.18 @ 11:21) >   1. Left ventricular ejection fraction, by visual estimation, is 45 to   50%.   2. Mildly decreased global left ventricular systolic function.   3. Basal and mid inferior wall is abnormal as described above.   4. Mildlyincreased LV wall thickness.   5. IAS intact wasnt evaluated for shunt.   6. Mildly enlarged left atrium.     7. Degenerative mitral valve.   8. Moderate to severe mitral annular calcification.   9. Severe mitral valve regurgitation.  10. Thickening and calcification of the anterior and posterior mitral   valve leaflets.  11. MVA from subgastric views by palnimetry is 4.0 Vena contracta is .73   on image 6,l MV coapt appears to be .4 mm on image 21, posterior leaflet   length appears to be between 11 and 14 cm, Tented MV is .93cm ERO of .58   with a RV of 58cc allc/w severe MR.  12. Moderate-severe tricuspid regurgitation.  13. PSAP at least 60.  14. No evidence of aortic stenosis.  15. No left atrial appendage thrombus and decreased left atrial appendage   velocities.  16. Simple atheroma seen in the aortic arch.      PHYSICAL EXAM:  GEN: No acute distress  LUNGS: Clear to auscultation bilaterally   HEART: S1/S2 present. Irregular Irregular rate and rhythm  ABD: Soft, non-tender, non-distended. Bowel sounds present  EXT: NC/NC/NE/2+PP/MARQUEZ/Skin Intact. L groin soft, no hematoma  NEURO: AAOX3      Intravenous access:   NG tube:   Arcos cathter: SUBJECTIVE:    Patient is a 78y old Male who presents with a chief complaint of VT (07 Oct 2018 10:49)    Currently admitted to medicine with the primary diagnosis of  Vtach during his elective MELBA evaluating for severe MR    Today is hospital day 5d. This morning he is resting comfortably in bed and reports no new issues or overnight events. Patient is s/p Left heart Cath, no dizziness, weakness, cp sob,    PAST MEDICAL & SURGICAL HISTORY  Mitral regurgitation  Afib  Diabetes  Hypertension  MI, old  CAD (coronary artery disease)  S/P subdural hematoma evacuation  S/P CABG x 1    ALLERGIES:  Biaxin (Stomach Upset)    MEDICATIONS:  STANDING MEDICATIONS  amLODIPine   Tablet 5 milliGRAM(s) Oral daily  chlorhexidine 4% Liquid 1 Application(s) Topical <User Schedule>  dextrose 50% Injectable 12.5 Gram(s) IV Push once  dextrose 50% Injectable 25 Gram(s) IV Push once  dextrose 50% Injectable 25 Gram(s) IV Push once  furosemide    Tablet 20 milliGRAM(s) Oral daily  insulin glargine Injectable (LANTUS) 7 Unit(s) SubCutaneous at bedtime  insulin lispro Injectable (HumaLOG) 5 Unit(s) SubCutaneous before breakfast  insulin lispro Injectable (HumaLOG) 5 Unit(s) SubCutaneous before lunch  insulin lispro Injectable (HumaLOG) 5 Unit(s) SubCutaneous before dinner  losartan 25 milliGRAM(s) Oral daily  simvastatin 20 milliGRAM(s) Oral at bedtime  sodium chloride 0.9%. 1000 milliLiter(s) IV Continuous <Continuous>    PRN MEDICATIONS  dextrose 40% Gel 15 Gram(s) Oral once PRN  glucagon  Injectable 1 milliGRAM(s) IntraMuscular once PRN    VITALS:   T(F): 97.7  HR: 64  BP: 137/59  RR: 18  SpO2: 97%    LABS:                        13.5   10.26 )-----------( 162      ( 09 Oct 2018 04:29 )             39.6     10-09    134<L>  |  97<L>  |  19  ----------------------------<  85  4.5   |  23  |  0.7    Ca    8.9      09 Oct 2018 04:29  Mg     2.0     10-09    TPro  6.5  /  Alb  3.7  /  TBili  2.2<H>  /  DBili  x   /  AST  24  /  ALT  16  /  AlkPhos  143<H>  10-09    PT/INR - ( 09 Oct 2018 13:12 )   PT: 19.00 sec;   INR: 1.77 ratio       PTT - ( 09 Oct 2018 04:29 )  PTT:35.7 sec    Troponin T, Serum: <0.01 ng/mL (10-09-18 @ 04:29)      CARDIAC MARKERS ( 09 Oct 2018 04:29 )  x     / <0.01 ng/mL / x     / x     / x      CARDIAC MARKERS ( 08 Oct 2018 04:45 )  x     / <0.01 ng/mL / x     / x     / x          RADIOLOGY:  < from: MELBA w/Probe Placement (10.04.18 @ 11:21) >   1. Left ventricular ejection fraction, by visual estimation, is 45 to   50%.   2. Mildly decreased global left ventricular systolic function.   3. Basal and mid inferior wall is abnormal as described above.   4. Mildlyincreased LV wall thickness.   5. IAS intact wasnt evaluated for shunt.   6. Mildly enlarged left atrium.     7. Degenerative mitral valve.   8. Moderate to severe mitral annular calcification.   9. Severe mitral valve regurgitation.  10. Thickening and calcification of the anterior and posterior mitral   valve leaflets.  11. MVA from subgastric views by palnimetry is 4.0 Vena contracta is .73   on image 6,l MV coapt appears to be .4 mm on image 21, posterior leaflet   length appears to be between 11 and 14 cm, Tented MV is .93cm ERO of .58   with a RV of 58cc allc/w severe MR.  12. Moderate-severe tricuspid regurgitation.  13. PSAP at least 60.  14. No evidence of aortic stenosis.  15. No left atrial appendage thrombus and decreased left atrial appendage   velocities.  16. Simple atheroma seen in the aortic arch.          PHYSICAL EXAM:  GEN: No acute distress  LUNGS: Clear to auscultation bilaterally   HEART: S1/S2 present. Irregular Irregular rate and rhythm  ABD: Soft, non-tender, non-distended. Bowel sounds present  EXT: NC/NC/NE/2+PP/MARQUEZ/Skin Intact. L groin soft, no hematoma  NEURO: AAOX3      Intravenous access:   NG tube:   Arcos cathter: SUBJECTIVE:    Patient is a 78y old Male who presents with a chief complaint of VT (07 Oct 2018 10:49)    Currently admitted to medicine with the primary diagnosis of  Vtach during his elective MELBA evaluating for severe MR    Today is hospital day 5d. This morning he is resting comfortably in bed and reports no new issues or overnight events. Patient is s/p Left heart Cath, no dizziness, weakness, cp sob,    PAST MEDICAL & SURGICAL HISTORY  Mitral regurgitation  Afib  Diabetes  Hypertension  MI, old  CAD (coronary artery disease)  S/P subdural hematoma evacuation  S/P CABG x 1    ALLERGIES:  Biaxin (Stomach Upset)    MEDICATIONS:  STANDING MEDICATIONS  amLODIPine   Tablet 5 milliGRAM(s) Oral daily  chlorhexidine 4% Liquid 1 Application(s) Topical <User Schedule>  dextrose 50% Injectable 12.5 Gram(s) IV Push once  dextrose 50% Injectable 25 Gram(s) IV Push once  dextrose 50% Injectable 25 Gram(s) IV Push once  furosemide    Tablet 20 milliGRAM(s) Oral daily  insulin glargine Injectable (LANTUS) 7 Unit(s) SubCutaneous at bedtime  insulin lispro Injectable (HumaLOG) 5 Unit(s) SubCutaneous before breakfast  insulin lispro Injectable (HumaLOG) 5 Unit(s) SubCutaneous before lunch  insulin lispro Injectable (HumaLOG) 5 Unit(s) SubCutaneous before dinner  losartan 25 milliGRAM(s) Oral daily  simvastatin 20 milliGRAM(s) Oral at bedtime  sodium chloride 0.9%. 1000 milliLiter(s) IV Continuous <Continuous>    PRN MEDICATIONS  dextrose 40% Gel 15 Gram(s) Oral once PRN  glucagon  Injectable 1 milliGRAM(s) IntraMuscular once PRN    VITALS:   T(F): 97.7  HR: 64  BP: 137/59  RR: 18  SpO2: 97%    LABS:                        13.5   10.26 )-----------( 162      ( 09 Oct 2018 04:29 )             39.6     10-09    134<L>  |  97<L>  |  19  ----------------------------<  85  4.5   |  23  |  0.7    Ca    8.9      09 Oct 2018 04:29  Mg     2.0     10-09    TPro  6.5  /  Alb  3.7  /  TBili  2.2<H>  /  DBili  x   /  AST  24  /  ALT  16  /  AlkPhos  143<H>  10-09    PT/INR - ( 09 Oct 2018 13:12 )   PT: 19.00 sec;   INR: 1.77 ratio       PTT - ( 09 Oct 2018 04:29 )  PTT:35.7 sec    Troponin T, Serum: <0.01 ng/mL (10-09-18 @ 04:29)      CARDIAC MARKERS ( 09 Oct 2018 04:29 )  x     / <0.01 ng/mL / x     / x     / x      CARDIAC MARKERS ( 08 Oct 2018 04:45 )  x     / <0.01 ng/mL / x     / x     / x          RADIOLOGY:  < from: MELBA w/Probe Placement (10.04.18 @ 11:21) >   1. Left ventricular ejection fraction, by visual estimation, is 45 to   50%.   2. Mildly decreased global left ventricular systolic function.   3. Basal and mid inferior wall is abnormal as described above.   4. Mildlyincreased LV wall thickness.   5. IAS intact wasnt evaluated for shunt.   6. Mildly enlarged left atrium.     7. Degenerative mitral valve.   8. Moderate to severe mitral annular calcification.   9. Severe mitral valve regurgitation.  10. Thickening and calcification of the anterior and posterior mitral   valve leaflets.  11. MVA from subgastric views by palnimetry is 4.0 Vena contracta is .73   on image 6,l MV coapt appears to be .4 mm on image 21, posterior leaflet   length appears to be between 11 and 14 cm, Tented MV is .93cm ERO of .58   with a RV of 58cc allc/w severe MR.  12. Moderate-severe tricuspid regurgitation.  13. PSAP at least 60.  14. No evidence of aortic stenosis.  15. No left atrial appendage thrombus and decreased left atrial appendage   velocities.  16. Simple atheroma seen in the aortic arch.          PHYSICAL EXAM:  GEN: No acute distress  LUNGS: Clear to auscultation bilaterally   HEART: S1/S2 present. Irregular Irregular rate and rhythm  ABD: Soft, non-tender, non-distended. Bowel sounds present  EXT: NC/NC/NE/2+PP/MARQUEZ/Skin Intact. L groin soft, no hematoma  L Knee swelling erythematous, decreased range of motion TTP  NEURO: AAOX3      Intravenous access:   NG tube:   Arcos cathter:

## 2018-10-10 LAB
ALBUMIN SERPL ELPH-MCNC: 3.5 G/DL — SIGNIFICANT CHANGE UP (ref 3.5–5.2)
ALP SERPL-CCNC: 129 U/L — HIGH (ref 30–115)
ALT FLD-CCNC: 15 U/L — SIGNIFICANT CHANGE UP (ref 0–41)
ANION GAP SERPL CALC-SCNC: 15 MMOL/L — HIGH (ref 7–14)
AST SERPL-CCNC: 24 U/L — SIGNIFICANT CHANGE UP (ref 0–41)
BILIRUB DIRECT SERPL-MCNC: 1 MG/DL — HIGH (ref 0–0.2)
BILIRUB INDIRECT FLD-MCNC: 1.7 MG/DL — HIGH (ref 0.2–1.2)
BILIRUB SERPL-MCNC: 2.7 MG/DL — HIGH (ref 0.2–1.2)
BUN SERPL-MCNC: 23 MG/DL — HIGH (ref 10–20)
CALCIUM SERPL-MCNC: 8.7 MG/DL — SIGNIFICANT CHANGE UP (ref 8.5–10.1)
CHLORIDE SERPL-SCNC: 96 MMOL/L — LOW (ref 98–110)
CO2 SERPL-SCNC: 23 MMOL/L — SIGNIFICANT CHANGE UP (ref 17–32)
CREAT SERPL-MCNC: 0.8 MG/DL — SIGNIFICANT CHANGE UP (ref 0.7–1.5)
GLUCOSE BLDC GLUCOMTR-MCNC: 103 MG/DL — HIGH (ref 70–99)
GLUCOSE BLDC GLUCOMTR-MCNC: 104 MG/DL — HIGH (ref 70–99)
GLUCOSE BLDC GLUCOMTR-MCNC: 124 MG/DL — HIGH (ref 70–99)
GLUCOSE BLDC GLUCOMTR-MCNC: 77 MG/DL — SIGNIFICANT CHANGE UP (ref 70–99)
GLUCOSE SERPL-MCNC: 87 MG/DL — SIGNIFICANT CHANGE UP (ref 70–99)
HCT VFR BLD CALC: 39.2 % — LOW (ref 42–52)
HGB BLD-MCNC: 13.1 G/DL — LOW (ref 14–18)
INR BLD: 1.86 RATIO — HIGH (ref 0.65–1.3)
MAGNESIUM SERPL-MCNC: 2 MG/DL — SIGNIFICANT CHANGE UP (ref 1.8–2.4)
MCHC RBC-ENTMCNC: 32 PG — HIGH (ref 27–31)
MCHC RBC-ENTMCNC: 33.4 G/DL — SIGNIFICANT CHANGE UP (ref 32–37)
MCV RBC AUTO: 95.6 FL — HIGH (ref 80–94)
NRBC # BLD: 0 /100 WBCS — SIGNIFICANT CHANGE UP (ref 0–0)
PHOSPHATE SERPL-MCNC: 2.9 MG/DL — SIGNIFICANT CHANGE UP (ref 2.1–4.9)
PLATELET # BLD AUTO: 137 K/UL — SIGNIFICANT CHANGE UP (ref 130–400)
POTASSIUM SERPL-MCNC: 4.4 MMOL/L — SIGNIFICANT CHANGE UP (ref 3.5–5)
POTASSIUM SERPL-SCNC: 4.4 MMOL/L — SIGNIFICANT CHANGE UP (ref 3.5–5)
PROT SERPL-MCNC: 6.5 G/DL — SIGNIFICANT CHANGE UP (ref 6–8)
PROTHROM AB SERPL-ACNC: 20 SEC — HIGH (ref 9.95–12.87)
RBC # BLD: 4.1 M/UL — LOW (ref 4.7–6.1)
RBC # FLD: 14.6 % — HIGH (ref 11.5–14.5)
SODIUM SERPL-SCNC: 134 MMOL/L — LOW (ref 135–146)
TROPONIN T SERPL-MCNC: <0.01 NG/ML — SIGNIFICANT CHANGE UP
WBC # BLD: 9.66 K/UL — SIGNIFICANT CHANGE UP (ref 4.8–10.8)
WBC # FLD AUTO: 9.66 K/UL — SIGNIFICANT CHANGE UP (ref 4.8–10.8)

## 2018-10-10 RX ORDER — SODIUM CHLORIDE 9 MG/ML
1000 INJECTION INTRAMUSCULAR; INTRAVENOUS; SUBCUTANEOUS
Qty: 0 | Refills: 0 | Status: DISCONTINUED | OUTPATIENT
Start: 2018-10-10 | End: 2018-10-10

## 2018-10-10 RX ADMIN — Medication 5 UNIT(S): at 12:01

## 2018-10-10 RX ADMIN — SIMVASTATIN 20 MILLIGRAM(S): 20 TABLET, FILM COATED ORAL at 21:33

## 2018-10-10 RX ADMIN — Medication 5 UNIT(S): at 17:00

## 2018-10-10 RX ADMIN — LOSARTAN POTASSIUM 25 MILLIGRAM(S): 100 TABLET, FILM COATED ORAL at 05:50

## 2018-10-10 RX ADMIN — INSULIN GLARGINE 7 UNIT(S): 100 INJECTION, SOLUTION SUBCUTANEOUS at 21:34

## 2018-10-10 RX ADMIN — AMLODIPINE BESYLATE 5 MILLIGRAM(S): 2.5 TABLET ORAL at 05:50

## 2018-10-10 RX ADMIN — Medication 400 MILLIGRAM(S): at 09:33

## 2018-10-10 RX ADMIN — SODIUM CHLORIDE 75 MILLILITER(S): 9 INJECTION INTRAMUSCULAR; INTRAVENOUS; SUBCUTANEOUS at 00:29

## 2018-10-10 RX ADMIN — Medication 400 MILLIGRAM(S): at 08:25

## 2018-10-10 RX ADMIN — Medication 20 MILLIGRAM(S): at 05:50

## 2018-10-10 NOTE — PROGRESS NOTE ADULT - ASSESSMENT
This is an 78 year old male who presented for elective MELBA for further evaluation of his MR. During the MELBA procedure, patient developed sustained VTach x2 with stable BP. He received in total two shocks of 100 joules, one dose of IV lidocaine, and 150 mg of IV amiodarone. Since, the IV amiodarone drip has been discontinued. Admitted to CCU for further cardiac monitoring.    Ventricular Arrhythmia during MELBA  - If reoccurs, give lidocaine bolus 50mg IV, then 1mg/min  -Will need AICD patient is NPO @[place]  -AICD placement in AM 10/11    Mitral regurgitation, Severe  - s/p Cardiac Cath, no stents  LVEF%: 35%  Normal Coronary Arteries  Luminal Irregularities  Non-obstructive CAD  -INR 1.77  - CE negative to date    Atrial fibrallation  - Holding coumadin for AICD, call Dr. Ruvalcaba and EP to ask when to restart  -Metoprolol XL 12.5 QD  - No amio as QTC prolonged    R Knee Gout  Patient has hx of gout   Ibuprofen 400mg BID    Elevated T-Bili  RUQ Sono negative for pathology  T-bili 2.7   Alkphos 129      Diabetes Mellitus  -Basal/Bolus insulin with SS 15/5  - stable FS glucose    HTN/HLD  Lasix 20mg qd  Metoprolol XL 12.5 QD  Amlodipine 5mg qd  Losartan 25mg qd  Simvastatin 20mg at bedtime  _________________________________________  Fluid: PO intake  Nutrition: Carb Consistent  Electrolytes:      Sodium - NL      Potassium - NL      Bicarb - NL      Chloride - NL      Magnesium - NL      Phosphorus - NL      GI PPX:                                   (X) Not indicated;  () Pantoprazole 40mg Daily    DVT PPX:  () Not indicated;  () Heparin 5000 mg SubQ;  () Lovenox 40 mg SubQ;  (X) SCDs    ACTIVITY:  () Ad Shazia  /  (X) Increase as Tolerated  /  () OOB w/ assist  /  () Bed Rest    BMI:  Height (cm): 177.8 (10-04)  Weight (kg): 78.925 (10-04)  BMI (kg/m2): 25 (10-04)    DISPO:  Patient to be discharged when condition(s) optimized.    (X) Discussion with patient and/or family regarding goals of care.  (X) Discussed Case and Plan with the Medical Attending.    |::::::::::::::::::::::::::::| CODE STATUS |::::::::::::::::::::::::::::|        (X) FULL     ~     () DNR     ~     () DNI  |:::::::::::::::::::::::::::::::::::::::::::::::::::::::::::::::::::::::::::::::::::::::::|    ------------------------------------------------------------------------------------------------------------ This is an 78 year old male who presented for elective MELBA for further evaluation of his MR. During the MELBA procedure, patient developed sustained VTach x2 with stable BP. He received in total two shocks of 100 joules, one dose of IV lidocaine, and 150 mg of IV amiodarone. Since, the IV amiodarone drip has been discontinued. Admitted to CCU for further cardiac monitoring.    Ventricular Arrhythmia during MELBA  - If reoccurs, give lidocaine bolus 50mg IV, then 1mg/min  -Will need AICD patient is NPO @[place]  -AICD placement in AM 10/11    Mitral regurgitation, Severe  - s/p Cardiac Cath, no stents  LVEF%: 35%  Normal Coronary Arteries  Luminal Irregularities  Non-obstructive CAD  -INR 1.86  - CE negative to date    Atrial fibrallation  - Holding coumadin for AICD, call Dr. Ruvalcaba and EP to ask when to restart  -Metoprolol XL 12.5 QD  - No amio as QTC prolonged    R Knee Gout  Patient has hx of gout   Ibuprofen 400mg BID    Elevated T-Bili  RUQ Sono negative for pathology  T-bili 2.7   Alkphos 129      Diabetes Mellitus  -Basal/Bolus insulin with SS 15/5  - stable FS glucose    HTN/HLD  Lasix 20mg qd  Metoprolol XL 12.5 QD  Amlodipine 5mg qd  Losartan 25mg qd  Simvastatin 20mg at bedtime  _________________________________________  Fluid: PO intake  Nutrition: Carb Consistent  Electrolytes:      Sodium - NL      Potassium - NL      Bicarb - NL      Chloride - NL      Magnesium - NL      Phosphorus - NL      GI PPX:                                   (X) Not indicated;  () Pantoprazole 40mg Daily    DVT PPX:  () Not indicated;  () Heparin 5000 mg SubQ;  () Lovenox 40 mg SubQ;  (X) SCDs    ACTIVITY:  () Ad Shazia  /  (X) Increase as Tolerated  /  () OOB w/ assist  /  () Bed Rest    BMI:  Height (cm): 177.8 (10-04)  Weight (kg): 78.925 (10-04)  BMI (kg/m2): 25 (10-04)    DISPO:  Patient to be discharged when condition(s) optimized.    (X) Discussion with patient and/or family regarding goals of care.  (X) Discussed Case and Plan with the Medical Attending.    |::::::::::::::::::::::::::::| CODE STATUS |::::::::::::::::::::::::::::|        (X) FULL     ~     () DNR     ~     () DNI  |:::::::::::::::::::::::::::::::::::::::::::::::::::::::::::::::::::::::::::::::::::::::::|    ------------------------------------------------------------------------------------------------------------

## 2018-10-10 NOTE — PROGRESS NOTE ADULT - SUBJECTIVE AND OBJECTIVE BOX
SUBJECTIVE:    Patient is a 78y old Male who presents with a chief complaint of VT (07 Oct 2018 10:49)    Currently admitted to medicine with the primary diagnosis of  Vtach during his elective MELBA evaluating for severe MR.     Today is hospital day 6d. This morning he is resting comfortably in bed and reports no new issues or overnight events. Patient is s/p Left heart Cath 10/9/18 denies dizziness, weakness, cp sob, headache.     PAST MEDICAL & SURGICAL HISTORY  Mitral regurgitation  Afib on coumadin  Diabetes  Hypertension  MI, old  CAD (coronary artery disease)  S/P subdural hematoma evacuation  S/P CABG x 1    ALLERGIES:  Biaxin (Stomach Upset)    MEDICATIONS:  STANDING MEDICATIONS  amLODIPine   Tablet 5 milliGRAM(s) Oral daily  chlorhexidine 4% Liquid 1 Application(s) Topical <User Schedule>  dextrose 50% Injectable 12.5 Gram(s) IV Push once  dextrose 50% Injectable 25 Gram(s) IV Push once  dextrose 50% Injectable 25 Gram(s) IV Push once  furosemide    Tablet 20 milliGRAM(s) Oral daily  insulin glargine Injectable (LANTUS) 7 Unit(s) SubCutaneous at bedtime  insulin lispro Injectable (HumaLOG) 5 Unit(s) SubCutaneous before breakfast  insulin lispro Injectable (HumaLOG) 5 Unit(s) SubCutaneous before lunch  insulin lispro Injectable (HumaLOG) 5 Unit(s) SubCutaneous before dinner  losartan 25 milliGRAM(s) Oral daily  metoprolol succinate ER 12.5 milliGRAM(s) Oral daily  simvastatin 20 milliGRAM(s) Oral at bedtime    PRN MEDICATIONS  dextrose 40% Gel 15 Gram(s) Oral once PRN  glucagon  Injectable 1 milliGRAM(s) IntraMuscular once PRN  ibuprofen  Tablet. 400 milliGRAM(s) Oral two times a day PRN    VITALS:   T(F): 97.6  HR: 64  BP: 120/59  RR: 24  SpO2: 95%    LABS:                        13.1   9.66  )-----------( 137      ( 10 Oct 2018 05:00 )             39.2     10-10    134<L>  |  96<L>  |  23<H>  ----------------------------<  87  4.4   |  23  |  0.8    Ca    8.7      10 Oct 2018 05:00  Phos  2.9     10-10  Mg     2.0     10-10    TPro  6.5  /  Alb  3.5  /  TBili  2.7<H>  /  DBili  1.0<H>  /  AST  24  /  ALT  15  /  AlkPhos  129<H>  10-10    PT/INR - ( 10 Oct 2018 05:00 )   PT: 20.00 sec;   INR: 1.86 ratio         PTT - ( 09 Oct 2018 04:29 )  PTT:35.7 sec    Troponin T, Serum: <0.01 ng/mL (10-10-18 @ 05:00)    CARDIAC MARKERS ( 10 Oct 2018 05:00 )  x     / <0.01 ng/mL / x     / x     / x      CARDIAC MARKERS ( 09 Oct 2018 04:29 )  x     / <0.01 ng/mL / x     / x     / x          RADIOLOGY:  < from: MELBA w/Probe Placement (10.04.18 @ 11:21) >   1. Left ventricular ejection fraction, by visual estimation, is 45 to   50%.   2. Mildly decreased global left ventricular systolic function.   3. Basal and mid inferior wall is abnormal as described above.   4. Mildlyincreased LV wall thickness.   5. IAS intact wasnt evaluated for shunt.   6. Mildly enlarged left atrium.     7. Degenerative mitral valve.   8. Moderate to severe mitral annular calcification.   9. Severe mitral valve regurgitation.  10. Thickening and calcification of the anterior and posterior mitral   valve leaflets.  11. MVA from subgastric views by palnimetry is 4.0 Vena contracta is .73   on image 6,l MV coapt appears to be .4 mm on image 21, posterior leaflet   length appears to be between 11 and 14 cm, Tented MV is .93cm ERO of .58   with a RV of 58cc allc/w severe MR.  12. Moderate-severe tricuspid regurgitation.  13. PSAP at least 60.  14. No evidence of aortic stenosis.  15. No left atrial appendage thrombus and decreased left atrial appendage   velocities.  16. Simple atheroma seen in the aortic arch.      PHYSICAL EXAM:  GEN: No acute distress  LUNGS: Clear to auscultation bilaterally   HEART: S1/S2 present. Irregular Irregular rate and rhythm  ABD: Soft, non-tender, non-distended. Bowel sounds present  EXT: NC/NC/NE/2+PP/MARQUEZ/Skin Intact. L groin soft, no hematoma L Knee swelling improved,  NEURO: AAOX3    Intravenous access:   NG tube:   Arcos cathter:

## 2018-10-10 NOTE — CONSULT NOTE ADULT - SUBJECTIVE AND OBJECTIVE BOX
Surgeon: /Mckinley/ Estella    Consult requesting by: Dr. Griffiths    HISTORY OF PRESENT ILLNESS:  HPI: 78 year old male with PMH of CAD (CABG 25yrs ago), DM, HTN, AF, mitral regurgitation and previous MI (25 yrs ago) is here for MELBA (preformed on 10/4/18) to evaluate MR possible mitral clip procedure.    According to patient/attending note, the patient developed sustained VT during procedure without EKG changes or hypoxia that required electrical cardioversion with 100 joules biphasic. He again developed VT post procedure that required 100 joules just as it broke. He was given 150mg Amnio and 50mg Lido and admitted to CCU for eventual cardiac cath and potential ICD placement    Cardiac cath was preformed yesterday which showed triple vessel disease, however no intervention at this time is needed according to interventional cardiology team.     CT surgery was consulted to pre-op patient for Medtronic single lead defibrillator tomorrow. Patient was seen and examined at the bedside. He has been stable all day with one episode of hypotension and bradycardia at 10am today (75/50, 56bpm), however remained asymptomatic. Metoprolol has been held since. Patient has no complaints today and is aware of his procedure tomorrow. He and his nurses are aware that patient must remain NPO after midnight for defibrillator implantation tomorrow.       PAST MEDICAL & SURGICAL HISTORY:  Mitral regurgitation  Afib  Diabetes  Hypertension  MI, old  CAD (coronary artery disease)  S/P subdural hematoma evacuation  S/P CABG x 1 ([facility] in [place] x 25yrs ago. ? surgeon       MEDICATIONS  (STANDING):  amLODIPine   Tablet 5 milliGRAM(s) Oral daily  chlorhexidine 4% Liquid 1 Application(s) Topical <User Schedule>  dextrose 50% Injectable 12.5 Gram(s) IV Push once  dextrose 50% Injectable 25 Gram(s) IV Push once  dextrose 50% Injectable 25 Gram(s) IV Push once  furosemide    Tablet 20 milliGRAM(s) Oral daily  insulin glargine Injectable (LANTUS) 7 Unit(s) SubCutaneous at bedtime  insulin lispro Injectable (HumaLOG) 5 Unit(s) SubCutaneous before breakfast  insulin lispro Injectable (HumaLOG) 5 Unit(s) SubCutaneous before lunch  insulin lispro Injectable (HumaLOG) 5 Unit(s) SubCutaneous before dinner  losartan 25 milliGRAM(s) Oral daily  metoprolol succinate ER 12.5 milliGRAM(s) Oral daily  simvastatin 20 milliGRAM(s) Oral at bedtime    MEDICATIONS  (PRN):  dextrose 40% Gel 15 Gram(s) Oral once PRN Blood Glucose LESS THAN 70 milliGRAM(s)/deciliter  glucagon  Injectable 1 milliGRAM(s) IntraMuscular once PRN Glucose LESS THAN 70 milligrams/deciliter  ibuprofen  Tablet. 400 milliGRAM(s) Oral two times a day PRN Mild Pain (1 - 3)    Antiplatelet therapy:            NONE               Last dose/amt:    Allergies: Biaxin (Stomach Upset)    Intolerances        SOCIAL HISTORY:  Smoker: [ ] Yes  [x ] No        PACK YEARS:                         WHEN QUIT?  ETOH use: [ ] Yes  [x ] No              FREQUENCY / QUANTITY:  Ilicit Drug use:  [ ] Yes  [ x] No  Occupation: Unemployed   Lives with: Daughter  Assisted device use: None    FAMILY HISTORY:  Mother - triple bypass/valve replacement (unspecified) at at 80  Father -  at 72 of MI      Review of Systems  CONSTITUTIONAL:  Fevers[ ] chills[ ] sweats[ ] fatigue[ ] weight loss[ ] weight gain [ ]            NEGATIVE [X ]   NEURO:  parathesias[ ] seizures [ ]  syncope [ ]  confusion [ ]                                                       NEGATIVE[ X]   EYES: glasses[ ]  blurry vision[ ]  discharge[ ] pain[ ] glaucoma [ ]                                                 NEGATIVE[X ]   ENMT:  difficulty hearing [ ]  vertigo[ ]  dysphagia[ ] epistaxis[ ] recent dental work [ ]           NEGATIVE[ X]   CV:  chest pain[ ] palpitations[ ] BEY [ ] diaphoresis [ ]                                                                  NEGATIVE[ X]   RESPIRATORY:  wheezing[ ] SOB[ ] cough [ ] sputum[ ] hemoptysis[ ]                                          NEGATIVE[ ]   GI:  nausea[ ]  vomiting [ ]  diarrhea[ ] constipation [ ] melena [ ]                                             NEGATIVE[ X]   : hematuria[ ]  dysuria[ ] urgency[ ] incontinence[ ]                                                                   NEGATIVE[ X]   MUSCULOSKELETAL:  arthritis[ ]  joint swelling [ ] muscle weakness [ ] Hx vein stripping [ ]   NEGATIVE[X ]   SKIN/BREAST:  rash[ ] itching [ ]  hair loss[ ] masses[ ]                                                                    NEGATIVE[ X]   PSYCH:  dementia [ ] depression [ ] anxiety[ ]                                                                                     NEGATIVE[X ]   HEME/LYMPH:  bruises easily[ ] enlarged lymph nodes[ ] tender lymph nodes[ ]                     NEGATIVE[ X]   ENDOCRINE:  cold intolerance[ ] heat intolerance[ ] polydipsia[ ]                                                NEGATIVE[ X]     PHYSICAL EXAM  Vital Signs Last 24 Hrs  T(C): 36.4 (10 Oct 2018 11:57), Max: 37.7 (09 Oct 2018 20:00)  T(F): 97.6 (10 Oct 2018 11:57), Max: 99.8 (09 Oct 2018 20:00)  HR: 64 (10 Oct 2018 14:23) (48 - 78)  BP: 120/59 (10 Oct 2018 14:23) (75/50 - 129/59)  BP(mean): 76 (10 Oct 2018 14:23) (56 - 96)  RR: 24 (10 Oct 2018 14:23) (16 - 26)  SpO2: 95% (10 Oct 2018 14:23) (91% - 98%)      CONSTITUTIONAL:  WNL[ x]   Neuro: WNL[x ] Normal exam oriented to person/place & time with no focal motor or sensory  deficits. Other                     Eyes: WNL[x ]   Normal exam of conjunctiva & lids, pupils equally reactive. Other     ENT: WNL[x ]    Normal exam of nasal/oral mucosa with absence of cyanosis. Other  Neck: WNL[ x]  Normal exam of jugular veins, trachea & thyroid. Other  Chest: WNL[x ] Normal lung exam with good air movement absence of wheezes, rales or Ronchi                                                                             CV:  Auscultation: normal [x ] S3[ ] S4[ ] Irregular [ ] Rub[ ] Clicks[ ]    Murmurs none:x[ ]systolic [ ]  diastolic [ ] holosystolic [ ]  Carotids: No Bruits[ x] Other                 Abdominal Aorta: normal [ ] nonpalpable[ ]Other                                                                                      GI: WNL[ x] Normal exam of abdomen, liver & spleen with no noted masses or tenderness. Other                                                                                                        Extremities: WNL[x ] Normal no evidence of cyanosis or deformity Edema: none[ ]trace[ ]1+[ ]2+[ ]3+[ ]4+[ ]  Lower Extremity Pulses: 2+ Right[x ] Left[x ]Varicosities[ ]  SKIN :WNL[x ] Normal exam to inspection & palation. Other:                                                          LABS:                        13.1   9.66  )-----------( 137      ( 10 Oct 2018 05:00 )             39.2     10-10    134<L>  |  96<L>  |  23<H>  ----------------------------<  87  4.4   |  23  |  0.8    Ca    8.7      10 Oct 2018 05:00  Phos  2.9     10-10  Mg     2.0     10-10    TPro  6.5  /  Alb  3.5  /  TBili  2.7<H>  /  DBili  1.0<H>  /  AST  24  /  ALT  15  /  AlkPhos  129<H>  10-10    PT/INR - ( 10 Oct 2018 05:00 )   PT: 20.00 sec;   INR: 1.86 ratio         PTT - ( 09 Oct 2018 04:29 )  PTT:35.7 sec    CARDIAC MARKERS ( 10 Oct 2018 05:00 )  x     / <0.01 ng/mL / x     / x     / x      CARDIAC MARKERS ( 09 Oct 2018 04:29 )  x     / <0.01 ng/mL / x     / x     / x              Cardiac Cath (10/9/18)    Left Heart Catheterization:  LVEF%: 35%  LVEDP:  [ ] Normal Coronary Arteries  [ ] Luminal Irregularities  [ ] Non-obstructive CAD      LEFT HEART CATHERIZATION                                    Left main Mild disease    LAD:         Mid 100%               Diag:    Left Circumflex: mid 90%  OM: Occluded        Right Cornary Artery: Mid 100%  RPDA  RPL    LIMA to LAD - patent.  SVG to OM - occluded  SVG to RPDA - thrombosed      TTE / MELBA: < from: MELBA w/Probe Placement (10.04.18 @ 11:21) >  Summary:   1. Left ventricular ejection fraction, by visual estimation, is 45 to   50%.   2. Mildly decreased global left ventricular systolic function.   3. Basal and mid inferior wall is abnormal as described above.   4. Mildlyincreased LV wall thickness.   5. IAS intact wasnt evaluated for shunt.   6. Mildly enlarged left atrium.   7. Degenerative mitral valve.   8. Moderate to severe mitral annular calcification.   9. Severe mitral valve regurgitation.  10. Thickening and calcification of the anterior and posterior mitral   valve leaflets.  11. MVA from subgastric views by palnimetry is 4.0 Vena contracta is .73   on image 6,l MV coapt appears to be .4 mm on image 21, posterior leaflet   length appears to be between 11 and 14 cm, Tented MV is .93cm ERO of .58   with a RV of 58cc allc/w severe MR.  12. Moderate-severe tricuspid regurgitation.  13. PSAP at least 60.  14. No evidence of aortic stenosis.  15. No left atrial appendage thrombus and decreased left atrial appendage   velocities.  16. Simple atheroma seen in the aortic arch.    Recommendation: (Procedures/Evaluations)  CT HEAD Nonn-Contrast:[  ]  CT Chest with /without contrast [ ]  Echocadiography :[ ]  Carotid Duplex :[ ]  JAIME/PVR: [ ]  PFT : Simple PFT [ ]  Full [ ]  Renal Consult [ ]  Pulmonary Consult: [ ]   Vascular Consult [ ]    Dental Consult [ ]   Hem-Onc Consult [ ]   GI Consult [ ]       CAD [x ]  Valvular  disease [x ] MR   Aortic Disease [ ]   CARMINE: Yes[ ] No [x ]   CKD Stage I [ ] , Stage II [ ] , Stage III [ ], Stage IV [ ]   Anemia: Yes [x ], No [ ]  Diabetes :Yes [x ], No [ ]  Acute MI: Yes [x ], No [ ]  (x25yrs ago)  Heart Failure: Yes [ ] , No [x ] HFpEF [ ], HFrEF [ ]        Assessment/ Plan:  #Irregular Heart Rhythm: Patient is cleared for defibrillator implantation tomorrow   - Will be kept NPO after midnight Surgeon: /Mckinley/ Estella    Consult requesting by: Dr. Griffiths    HISTORY OF PRESENT ILLNESS:  HPI: 78 year old male with PMH of CAD (CABG 25yrs ago), DM, HTN, AF, mitral regurgitation and previous MI (25 yrs ago) is here for MELBA (preformed on 10/4/18) to evaluate MR possible mitral clip procedure.    According to patient/attending note, the patient developed sustained VT during procedure without EKG changes or hypoxia that required electrical cardioversion with 100 joules biphasic. He again developed VT post procedure that required 100 joules just as it broke. He was given 150mg Amnio and 50mg Lido and admitted to CCU for eventual cardiac cath and potential ICD placement    Cardiac cath was preformed yesterday which showed patent LIMA to LAD.    CT surgery was consulted to pre-op patient for Medtronic single lead defibrillator tomorrow. Patient was seen and examined at the bedside. He has been stable all day with one episode of hypotension and bradycardia at 10am today (75/50, 56bpm), however remained asymptomatic. Metoprolol has been held since. Patient has no complaints today and is aware of his procedure tomorrow. He and his nurses are aware that patient must remain NPO after midnight for defibrillator implantation tomorrow.       PAST MEDICAL & SURGICAL HISTORY:  Mitral regurgitation  Afib  Diabetes  Hypertension  MI, old  CAD (coronary artery disease)  S/P subdural hematoma evacuation  S/P CABG x 1 ([facility] in [place] x 25yrs ago. ? surgeon       MEDICATIONS  (STANDING):  amLODIPine   Tablet 5 milliGRAM(s) Oral daily  chlorhexidine 4% Liquid 1 Application(s) Topical <User Schedule>  dextrose 50% Injectable 12.5 Gram(s) IV Push once  dextrose 50% Injectable 25 Gram(s) IV Push once  dextrose 50% Injectable 25 Gram(s) IV Push once  furosemide    Tablet 20 milliGRAM(s) Oral daily  insulin glargine Injectable (LANTUS) 7 Unit(s) SubCutaneous at bedtime  insulin lispro Injectable (HumaLOG) 5 Unit(s) SubCutaneous before breakfast  insulin lispro Injectable (HumaLOG) 5 Unit(s) SubCutaneous before lunch  insulin lispro Injectable (HumaLOG) 5 Unit(s) SubCutaneous before dinner  losartan 25 milliGRAM(s) Oral daily  metoprolol succinate ER 12.5 milliGRAM(s) Oral daily  simvastatin 20 milliGRAM(s) Oral at bedtime    MEDICATIONS  (PRN):  dextrose 40% Gel 15 Gram(s) Oral once PRN Blood Glucose LESS THAN 70 milliGRAM(s)/deciliter  glucagon  Injectable 1 milliGRAM(s) IntraMuscular once PRN Glucose LESS THAN 70 milligrams/deciliter  ibuprofen  Tablet. 400 milliGRAM(s) Oral two times a day PRN Mild Pain (1 - 3)    Antiplatelet therapy:            NONE               Last dose/amt:    Allergies: Biaxin (Stomach Upset)    Intolerances        SOCIAL HISTORY:  Smoker: [ ] Yes  [x ] No        PACK YEARS:                         WHEN QUIT?  ETOH use: [ ] Yes  [x ] No              FREQUENCY / QUANTITY:  Ilicit Drug use:  [ ] Yes  [ x] No  Occupation: Unemployed   Lives with: Daughter  Assisted device use: None    FAMILY HISTORY:  Mother - triple bypass/valve replacement (unspecified) at at 80  Father -  at 72 of MI      Review of Systems  CONSTITUTIONAL:  Fevers[ ] chills[ ] sweats[ ] fatigue[ ] weight loss[ ] weight gain [ ]            NEGATIVE [X ]   NEURO:  parathesias[ ] seizures [ ]  syncope [ ]  confusion [ ]                                                       NEGATIVE[ X]   EYES: glasses[ ]  blurry vision[ ]  discharge[ ] pain[ ] glaucoma [ ]                                                 NEGATIVE[X ]   ENMT:  difficulty hearing [ ]  vertigo[ ]  dysphagia[ ] epistaxis[ ] recent dental work [ ]           NEGATIVE[ X]   CV:  chest pain[ ] palpitations[ ] BEY [ ] diaphoresis [ ]                                                                  NEGATIVE[ X]   RESPIRATORY:  wheezing[ ] SOB[ ] cough [ ] sputum[ ] hemoptysis[ ]                                          NEGATIVE[ ]   GI:  nausea[ ]  vomiting [ ]  diarrhea[ ] constipation [ ] melena [ ]                                             NEGATIVE[ X]   : hematuria[ ]  dysuria[ ] urgency[ ] incontinence[ ]                                                                   NEGATIVE[ X]   MUSCULOSKELETAL:  arthritis[ ]  joint swelling [ ] muscle weakness [ ] Hx vein stripping [ ]   NEGATIVE[X ]   SKIN/BREAST:  rash[ ] itching [ ]  hair loss[ ] masses[ ]                                                                    NEGATIVE[ X]   PSYCH:  dementia [ ] depression [ ] anxiety[ ]                                                                                     NEGATIVE[X ]   HEME/LYMPH:  bruises easily[ ] enlarged lymph nodes[ ] tender lymph nodes[ ]                     NEGATIVE[ X]   ENDOCRINE:  cold intolerance[ ] heat intolerance[ ] polydipsia[ ]                                                NEGATIVE[ X]     PHYSICAL EXAM  Vital Signs Last 24 Hrs  T(C): 36.4 (10 Oct 2018 11:57), Max: 37.7 (09 Oct 2018 20:00)  T(F): 97.6 (10 Oct 2018 11:57), Max: 99.8 (09 Oct 2018 20:00)  HR: 64 (10 Oct 2018 14:23) (48 - 78)  BP: 120/59 (10 Oct 2018 14:23) (75/50 - 129/59)  BP(mean): 76 (10 Oct 2018 14:23) (56 - 96)  RR: 24 (10 Oct 2018 14:23) (16 - 26)  SpO2: 95% (10 Oct 2018 14:23) (91% - 98%)      CONSTITUTIONAL:  WNL[ x]   Neuro: WNL[x ] Normal exam oriented to person/place & time with no focal motor or sensory  deficits. Other                     Eyes: WNL[x ]   Normal exam of conjunctiva & lids, pupils equally reactive. Other     ENT: WNL[x ]    Normal exam of nasal/oral mucosa with absence of cyanosis. Other  Neck: WNL[ x]  Normal exam of jugular veins, trachea & thyroid. Other  Chest: WNL[x ] Normal lung exam with good air movement absence of wheezes, rales or Ronchi                                                                             CV:  Auscultation: normal [x ] S3[ ] S4[ ] Irregular [ ] Rub[ ] Clicks[ ]    Murmurs none:x[ ]systolic [ ]  diastolic [ ] holosystolic [ ]  Carotids: No Bruits[ x] Other                 Abdominal Aorta: normal [ ] nonpalpable[ ]Other                                                                                      GI: WNL[ x] Normal exam of abdomen, liver & spleen with no noted masses or tenderness. Other                                                                                                        Extremities: WNL[x ] Normal no evidence of cyanosis or deformity Edema: none[ ]trace[ ]1+[ ]2+[ ]3+[ ]4+[ ]  Lower Extremity Pulses: 2+ Right[x ] Left[x ]Varicosities[ ]  SKIN :WNL[x ] Normal exam to inspection & palation. Other:                                                          LABS:                        13.1   9.66  )-----------( 137      ( 10 Oct 2018 05:00 )             39.2     10-10    134<L>  |  96<L>  |  23<H>  ----------------------------<  87  4.4   |  23  |  0.8    Ca    8.7      10 Oct 2018 05:00  Phos  2.9     10-10  Mg     2.0     10-10    TPro  6.5  /  Alb  3.5  /  TBili  2.7<H>  /  DBili  1.0<H>  /  AST  24  /  ALT  15  /  AlkPhos  129<H>  10-10    PT/INR - ( 10 Oct 2018 05:00 )   PT: 20.00 sec;   INR: 1.86 ratio         PTT - ( 09 Oct 2018 04:29 )  PTT:35.7 sec    CARDIAC MARKERS ( 10 Oct 2018 05:00 )  x     / <0.01 ng/mL / x     / x     / x      CARDIAC MARKERS ( 09 Oct 2018 04:29 )  x     / <0.01 ng/mL / x     / x     / x              Cardiac Cath (10/9/18)    Left Heart Catheterization:  LVEF%: 35%  LVEDP:  [ ] Normal Coronary Arteries  [ ] Luminal Irregularities  [ ] Non-obstructive CAD      LEFT HEART CATHERIZATION                                    Left main Mild disease    LAD:         Mid 100%               Diag:    Left Circumflex: mid 90%  OM: Occluded        Right Cornary Artery: Mid 100%  RPDA  RPL    LIMA to LAD - patent.  SVG to OM - occluded  SVG to RPDA - thrombosed      TTE / MELBA: < from: MELBA w/Probe Placement (10.04.18 @ 11:21) >  Summary:   1. Left ventricular ejection fraction, by visual estimation, is 45 to   50%.   2. Mildly decreased global left ventricular systolic function.   3. Basal and mid inferior wall is abnormal as described above.   4. Mildlyincreased LV wall thickness.   5. IAS intact wasnt evaluated for shunt.   6. Mildly enlarged left atrium.   7. Degenerative mitral valve.   8. Moderate to severe mitral annular calcification.   9. Severe mitral valve regurgitation.  10. Thickening and calcification of the anterior and posterior mitral   valve leaflets.  11. MVA from subgastric views by palnimetry is 4.0 Vena contracta is .73   on image 6,l MV coapt appears to be .4 mm on image 21, posterior leaflet   length appears to be between 11 and 14 cm, Tented MV is .93cm ERO of .58   with a RV of 58cc allc/w severe MR.  12. Moderate-severe tricuspid regurgitation.  13. PSAP at least 60.  14. No evidence of aortic stenosis.  15. No left atrial appendage thrombus and decreased left atrial appendage   velocities.  16. Simple atheroma seen in the aortic arch.    Recommendation: (Procedures/Evaluations)  CT HEAD Nonn-Contrast:[  ]  CT Chest with /without contrast [ ]  Echocadiography :[ ]  Carotid Duplex :[ ]  JAIME/PVR: [ ]  PFT : Simple PFT [ ]  Full [ ]  Renal Consult [ ]  Pulmonary Consult: [ ]   Vascular Consult [ ]    Dental Consult [ ]   Hem-Onc Consult [ ]   GI Consult [ ]       CAD [x ]  Valvular  disease [x ] MR   Aortic Disease [ ]   CARMINE: Yes[ ] No [x ]   CKD Stage I [ ] , Stage II [ ] , Stage III [ ], Stage IV [ ]   Anemia: Yes [x ], No [ ]  Diabetes :Yes [x ], No [ ]  Acute MI: Yes [x ], No [ ]  (x25yrs ago)  Heart Failure: Yes [ ] , No [x ] HFpEF [ ], HFrEF [ ]        Assessment/ Plan:  #Irregular Heart Rhythm: Patient is cleared for defibrillator implantation tomorrow   - Will be kept NPO after midnight

## 2018-10-10 NOTE — CONSULT NOTE ADULT - ATTENDING COMMENTS
CTS ATTENDING    PATIENT WAS INTERVIEWED AND EXAMINED  CASE WAS DISCUSSED WITH THE ELECTROPHYSIOLOGIST  PATIENT AGREED TO PROCEED WITH ICD IMPLANTATION

## 2018-10-11 LAB
ALBUMIN SERPL ELPH-MCNC: 3.3 G/DL — LOW (ref 3.5–5.2)
ALBUMIN SERPL ELPH-MCNC: 3.5 G/DL — SIGNIFICANT CHANGE UP (ref 3.5–5.2)
ALP SERPL-CCNC: 137 U/L — HIGH (ref 30–115)
ALP SERPL-CCNC: 143 U/L — HIGH (ref 30–115)
ALT FLD-CCNC: 19 U/L — SIGNIFICANT CHANGE UP (ref 0–41)
ALT FLD-CCNC: 21 U/L — SIGNIFICANT CHANGE UP (ref 0–41)
ANION GAP SERPL CALC-SCNC: 13 MMOL/L — SIGNIFICANT CHANGE UP (ref 7–14)
ANION GAP SERPL CALC-SCNC: 20 MMOL/L — HIGH (ref 7–14)
APTT BLD: 57.3 SEC — HIGH (ref 27–39.2)
AST SERPL-CCNC: 31 U/L — SIGNIFICANT CHANGE UP (ref 0–41)
AST SERPL-CCNC: 35 U/L — SIGNIFICANT CHANGE UP (ref 0–41)
BASOPHILS # BLD AUTO: 0.02 K/UL — SIGNIFICANT CHANGE UP (ref 0–0.2)
BASOPHILS NFR BLD AUTO: 0.2 % — SIGNIFICANT CHANGE UP (ref 0–1)
BILIRUB SERPL-MCNC: 1.5 MG/DL — HIGH (ref 0.2–1.2)
BILIRUB SERPL-MCNC: 1.6 MG/DL — HIGH (ref 0.2–1.2)
BUN SERPL-MCNC: 19 MG/DL — SIGNIFICANT CHANGE UP (ref 10–20)
BUN SERPL-MCNC: 20 MG/DL — SIGNIFICANT CHANGE UP (ref 10–20)
CALCIUM SERPL-MCNC: 8.8 MG/DL — SIGNIFICANT CHANGE UP (ref 8.5–10.1)
CALCIUM SERPL-MCNC: 8.8 MG/DL — SIGNIFICANT CHANGE UP (ref 8.5–10.1)
CHLORIDE SERPL-SCNC: 102 MMOL/L — SIGNIFICANT CHANGE UP (ref 98–110)
CHLORIDE SERPL-SCNC: 98 MMOL/L — SIGNIFICANT CHANGE UP (ref 98–110)
CK MB CFR SERPL CALC: 3.2 NG/ML — SIGNIFICANT CHANGE UP (ref 0.6–6.3)
CO2 SERPL-SCNC: 20 MMOL/L — SIGNIFICANT CHANGE UP (ref 17–32)
CO2 SERPL-SCNC: 22 MMOL/L — SIGNIFICANT CHANGE UP (ref 17–32)
CREAT SERPL-MCNC: 0.7 MG/DL — SIGNIFICANT CHANGE UP (ref 0.7–1.5)
CREAT SERPL-MCNC: 1 MG/DL — SIGNIFICANT CHANGE UP (ref 0.7–1.5)
EOSINOPHIL # BLD AUTO: 0.06 K/UL — SIGNIFICANT CHANGE UP (ref 0–0.7)
EOSINOPHIL NFR BLD AUTO: 0.5 % — SIGNIFICANT CHANGE UP (ref 0–8)
GLUCOSE BLDC GLUCOMTR-MCNC: 120 MG/DL — HIGH (ref 70–99)
GLUCOSE BLDC GLUCOMTR-MCNC: 127 MG/DL — HIGH (ref 70–99)
GLUCOSE BLDC GLUCOMTR-MCNC: 97 MG/DL — SIGNIFICANT CHANGE UP (ref 70–99)
GLUCOSE BLDC GLUCOMTR-MCNC: 99 MG/DL — SIGNIFICANT CHANGE UP (ref 70–99)
GLUCOSE SERPL-MCNC: 155 MG/DL — HIGH (ref 70–99)
GLUCOSE SERPL-MCNC: 89 MG/DL — SIGNIFICANT CHANGE UP (ref 70–99)
HCT VFR BLD CALC: 39.6 % — LOW (ref 42–52)
HCT VFR BLD CALC: 44.8 % — SIGNIFICANT CHANGE UP (ref 42–52)
HGB BLD-MCNC: 13.1 G/DL — LOW (ref 14–18)
HGB BLD-MCNC: 15 G/DL — SIGNIFICANT CHANGE UP (ref 14–18)
IMM GRANULOCYTES NFR BLD AUTO: 0.8 % — HIGH (ref 0.1–0.3)
INR BLD: 1.68 RATIO — HIGH (ref 0.65–1.3)
LYMPHOCYTES # BLD AUTO: 1.15 K/UL — LOW (ref 1.2–3.4)
LYMPHOCYTES # BLD AUTO: 10.4 % — LOW (ref 20.5–51.1)
MAGNESIUM SERPL-MCNC: 1.8 MG/DL — SIGNIFICANT CHANGE UP (ref 1.8–2.4)
MAGNESIUM SERPL-MCNC: 1.9 MG/DL — SIGNIFICANT CHANGE UP (ref 1.8–2.4)
MCHC RBC-ENTMCNC: 31.1 PG — HIGH (ref 27–31)
MCHC RBC-ENTMCNC: 31.5 PG — HIGH (ref 27–31)
MCHC RBC-ENTMCNC: 33.1 G/DL — SIGNIFICANT CHANGE UP (ref 32–37)
MCHC RBC-ENTMCNC: 33.5 G/DL — SIGNIFICANT CHANGE UP (ref 32–37)
MCV RBC AUTO: 94.1 FL — HIGH (ref 80–94)
MCV RBC AUTO: 94.1 FL — HIGH (ref 80–94)
MONOCYTES # BLD AUTO: 0.53 K/UL — SIGNIFICANT CHANGE UP (ref 0.1–0.6)
MONOCYTES NFR BLD AUTO: 4.8 % — SIGNIFICANT CHANGE UP (ref 1.7–9.3)
NEUTROPHILS # BLD AUTO: 9.17 K/UL — HIGH (ref 1.4–6.5)
NEUTROPHILS NFR BLD AUTO: 83.3 % — HIGH (ref 42.2–75.2)
NRBC # BLD: 0 /100 WBCS — SIGNIFICANT CHANGE UP (ref 0–0)
PHOSPHATE SERPL-MCNC: 2.6 MG/DL — SIGNIFICANT CHANGE UP (ref 2.1–4.9)
PLATELET # BLD AUTO: 143 K/UL — SIGNIFICANT CHANGE UP (ref 130–400)
PLATELET # BLD AUTO: 200 K/UL — SIGNIFICANT CHANGE UP (ref 130–400)
POTASSIUM SERPL-MCNC: 3.8 MMOL/L — SIGNIFICANT CHANGE UP (ref 3.5–5)
POTASSIUM SERPL-MCNC: 4.6 MMOL/L — SIGNIFICANT CHANGE UP (ref 3.5–5)
POTASSIUM SERPL-SCNC: 3.8 MMOL/L — SIGNIFICANT CHANGE UP (ref 3.5–5)
POTASSIUM SERPL-SCNC: 4.6 MMOL/L — SIGNIFICANT CHANGE UP (ref 3.5–5)
PROT SERPL-MCNC: 6.5 G/DL — SIGNIFICANT CHANGE UP (ref 6–8)
PROT SERPL-MCNC: 6.9 G/DL — SIGNIFICANT CHANGE UP (ref 6–8)
PROTHROM AB SERPL-ACNC: 18 SEC — HIGH (ref 9.95–12.87)
RBC # BLD: 4.21 M/UL — LOW (ref 4.7–6.1)
RBC # BLD: 4.76 M/UL — SIGNIFICANT CHANGE UP (ref 4.7–6.1)
RBC # FLD: 14.3 % — SIGNIFICANT CHANGE UP (ref 11.5–14.5)
RBC # FLD: 14.4 % — SIGNIFICANT CHANGE UP (ref 11.5–14.5)
SODIUM SERPL-SCNC: 137 MMOL/L — SIGNIFICANT CHANGE UP (ref 135–146)
SODIUM SERPL-SCNC: 138 MMOL/L — SIGNIFICANT CHANGE UP (ref 135–146)
TROPONIN T SERPL-MCNC: <0.01 NG/ML — SIGNIFICANT CHANGE UP
WBC # BLD: 11.02 K/UL — HIGH (ref 4.8–10.8)
WBC # BLD: 8.37 K/UL — SIGNIFICANT CHANGE UP (ref 4.8–10.8)
WBC # FLD AUTO: 11.02 K/UL — HIGH (ref 4.8–10.8)
WBC # FLD AUTO: 8.37 K/UL — SIGNIFICANT CHANGE UP (ref 4.8–10.8)

## 2018-10-11 RX ORDER — INSULIN GLARGINE 100 [IU]/ML
7 INJECTION, SOLUTION SUBCUTANEOUS AT BEDTIME
Qty: 0 | Refills: 0 | Status: DISCONTINUED | OUTPATIENT
Start: 2018-10-11 | End: 2018-10-12

## 2018-10-11 RX ORDER — CEFAZOLIN SODIUM 1 G
1000 VIAL (EA) INJECTION EVERY 8 HOURS
Qty: 0 | Refills: 0 | Status: COMPLETED | OUTPATIENT
Start: 2018-10-11 | End: 2018-10-12

## 2018-10-11 RX ORDER — INSULIN LISPRO 100/ML
5 VIAL (ML) SUBCUTANEOUS
Qty: 0 | Refills: 0 | Status: DISCONTINUED | OUTPATIENT
Start: 2018-10-11 | End: 2018-10-12

## 2018-10-11 RX ORDER — NOREPINEPHRINE BITARTRATE/D5W 8 MG/250ML
0.05 PLASTIC BAG, INJECTION (ML) INTRAVENOUS
Qty: 8 | Refills: 0 | Status: DISCONTINUED | OUTPATIENT
Start: 2018-10-11 | End: 2018-10-12

## 2018-10-11 RX ORDER — ACETAMINOPHEN 500 MG
650 TABLET ORAL EVERY 4 HOURS
Qty: 0 | Refills: 0 | Status: DISCONTINUED | OUTPATIENT
Start: 2018-10-11 | End: 2018-10-12

## 2018-10-11 RX ORDER — MAGNESIUM SULFATE 500 MG/ML
1 VIAL (ML) INJECTION ONCE
Qty: 0 | Refills: 0 | Status: COMPLETED | OUTPATIENT
Start: 2018-10-11 | End: 2018-10-11

## 2018-10-11 RX ORDER — DEXTROSE 50 % IN WATER 50 %
15 SYRINGE (ML) INTRAVENOUS ONCE
Qty: 0 | Refills: 0 | Status: DISCONTINUED | OUTPATIENT
Start: 2018-10-11 | End: 2018-10-12

## 2018-10-11 RX ORDER — DEXTROSE 50 % IN WATER 50 %
25 SYRINGE (ML) INTRAVENOUS ONCE
Qty: 0 | Refills: 0 | Status: DISCONTINUED | OUTPATIENT
Start: 2018-10-11 | End: 2018-10-12

## 2018-10-11 RX ORDER — LOSARTAN POTASSIUM 100 MG/1
25 TABLET, FILM COATED ORAL DAILY
Qty: 0 | Refills: 0 | Status: DISCONTINUED | OUTPATIENT
Start: 2018-10-11 | End: 2018-10-12

## 2018-10-11 RX ORDER — AMLODIPINE BESYLATE 2.5 MG/1
5 TABLET ORAL DAILY
Qty: 0 | Refills: 0 | Status: DISCONTINUED | OUTPATIENT
Start: 2018-10-11 | End: 2018-10-12

## 2018-10-11 RX ORDER — FUROSEMIDE 40 MG
20 TABLET ORAL DAILY
Qty: 0 | Refills: 0 | Status: DISCONTINUED | OUTPATIENT
Start: 2018-10-11 | End: 2018-10-12

## 2018-10-11 RX ORDER — SODIUM CHLORIDE 9 MG/ML
1000 INJECTION INTRAMUSCULAR; INTRAVENOUS; SUBCUTANEOUS
Qty: 0 | Refills: 0 | Status: DISCONTINUED | OUTPATIENT
Start: 2018-10-11 | End: 2018-10-12

## 2018-10-11 RX ORDER — OXYCODONE HYDROCHLORIDE 5 MG/1
5 TABLET ORAL EVERY 6 HOURS
Qty: 0 | Refills: 0 | Status: DISCONTINUED | OUTPATIENT
Start: 2018-10-11 | End: 2018-10-12

## 2018-10-11 RX ORDER — WARFARIN SODIUM 2.5 MG/1
2 TABLET ORAL ONCE
Qty: 0 | Refills: 0 | Status: COMPLETED | OUTPATIENT
Start: 2018-10-11 | End: 2018-10-11

## 2018-10-11 RX ORDER — DEXTROSE 50 % IN WATER 50 %
12.5 SYRINGE (ML) INTRAVENOUS ONCE
Qty: 0 | Refills: 0 | Status: DISCONTINUED | OUTPATIENT
Start: 2018-10-11 | End: 2018-10-12

## 2018-10-11 RX ORDER — CHLORHEXIDINE GLUCONATE 213 G/1000ML
1 SOLUTION TOPICAL
Qty: 0 | Refills: 0 | Status: DISCONTINUED | OUTPATIENT
Start: 2018-10-11 | End: 2018-10-12

## 2018-10-11 RX ORDER — SIMVASTATIN 20 MG/1
20 TABLET, FILM COATED ORAL AT BEDTIME
Qty: 0 | Refills: 0 | Status: DISCONTINUED | OUTPATIENT
Start: 2018-10-11 | End: 2018-10-12

## 2018-10-11 RX ORDER — METOPROLOL TARTRATE 50 MG
12.5 TABLET ORAL DAILY
Qty: 0 | Refills: 0 | Status: DISCONTINUED | OUTPATIENT
Start: 2018-10-11 | End: 2018-10-12

## 2018-10-11 RX ORDER — SODIUM CHLORIDE 9 MG/ML
1000 INJECTION INTRAMUSCULAR; INTRAVENOUS; SUBCUTANEOUS ONCE
Qty: 0 | Refills: 0 | Status: COMPLETED | OUTPATIENT
Start: 2018-10-11 | End: 2018-10-11

## 2018-10-11 RX ORDER — GLUCAGON INJECTION, SOLUTION 0.5 MG/.1ML
1 INJECTION, SOLUTION SUBCUTANEOUS ONCE
Qty: 0 | Refills: 0 | Status: DISCONTINUED | OUTPATIENT
Start: 2018-10-11 | End: 2018-10-12

## 2018-10-11 RX ADMIN — SODIUM CHLORIDE 4000 MILLILITER(S): 9 INJECTION INTRAMUSCULAR; INTRAVENOUS; SUBCUTANEOUS at 16:43

## 2018-10-11 RX ADMIN — Medication 20 MILLIGRAM(S): at 05:38

## 2018-10-11 RX ADMIN — SODIUM CHLORIDE 50 MILLILITER(S): 9 INJECTION INTRAMUSCULAR; INTRAVENOUS; SUBCUTANEOUS at 16:43

## 2018-10-11 RX ADMIN — CHLORHEXIDINE GLUCONATE 1 APPLICATION(S): 213 SOLUTION TOPICAL at 05:33

## 2018-10-11 RX ADMIN — AMLODIPINE BESYLATE 5 MILLIGRAM(S): 2.5 TABLET ORAL at 05:38

## 2018-10-11 RX ADMIN — Medication 12.5 MILLIGRAM(S): at 05:38

## 2018-10-11 RX ADMIN — SIMVASTATIN 20 MILLIGRAM(S): 20 TABLET, FILM COATED ORAL at 21:51

## 2018-10-11 RX ADMIN — INSULIN GLARGINE 7 UNIT(S): 100 INJECTION, SOLUTION SUBCUTANEOUS at 22:15

## 2018-10-11 RX ADMIN — LOSARTAN POTASSIUM 25 MILLIGRAM(S): 100 TABLET, FILM COATED ORAL at 05:38

## 2018-10-11 RX ADMIN — Medication 50 GRAM(S): at 16:41

## 2018-10-11 RX ADMIN — Medication 100 MILLIGRAM(S): at 21:51

## 2018-10-11 RX ADMIN — WARFARIN SODIUM 2 MILLIGRAM(S): 2.5 TABLET ORAL at 22:14

## 2018-10-11 NOTE — CHART NOTE - NSCHARTNOTEFT_GEN_A_CORE
Please do not give patient aspirin, heparin, motrin or Lovenox. Patient to start coumadin 2 mg today.  Please call to reconsider above after 3 days. [phone] Dr. Soria

## 2018-10-11 NOTE — DIETITIAN INITIAL EVALUATION ADULT. - ENERGY NEEDS
total kcal = 6467-0930 kcal/day (MSJ x 1.1-1.2).  total protein = 77-92 g/day (1.0-1.2 g/kg CBW).  total fluid = per CCU team

## 2018-10-11 NOTE — PACU DISCHARGE NOTE - COMMENTS
CCU  /62  HR 87  RR 16  sat 98%
Pt experienced 2 episodes of stable V tach during MELBA procedure ( see Chart).First episode treated with IV lidocaine 100 mg IV  Both episodes treated with sync /shock 100 j with conversion.  Baseline ECG demonstrated mult PVC's /min - 8-14  Pt fully awake /stable BP/HR @ 66/min/ 122/70 without complaint.   Pt to be admitted to [facility] per cardiology.  Pts hypotension treated with IV Phenylephrine and IV levophed

## 2018-10-11 NOTE — DIETITIAN INITIAL EVALUATION ADULT. - DIET TYPE
NPO after midnight/Pt NPO for AICD placement. Previously receiving CHO consistent diet with % intake. Pt reports adequate appetite and looking forward to eating s/p procedure. Discussed DASH/TLC, CHO consistent dietary reccs at bedside.

## 2018-10-11 NOTE — PRE-ANESTHESIA EVALUATION ADULT - NSANTHOSAYNRD_GEN_A_CORE
No. SAVI screening performed.  STOP BANG Legend: 0-2 = LOW Risk; 3-4 = INTERMEDIATE Risk; 5-8 = HIGH Risk

## 2018-10-11 NOTE — PROGRESS NOTE ADULT - SUBJECTIVE AND OBJECTIVE BOX
SUBJECTIVE:    Patient is a 78y old Male who presents with a chief complaint of VT (07 Oct 2018 10:49)    Currently admitted to medicine with the primary diagnosis of  Vtach during his elective MELBA evaluating for severe MR.     Currently admitted to medicine with the primary diagnosis of    Today is hospital day 7d. This morning he is resting comfortably in bed and reports no new issues or overnight events. Denies dizziness, weakness, cp sob, headache, palpitations, will got for AICD placement today.     PAST MEDICAL & SURGICAL HISTORY  Mitral regurgitation  Afib  Diabetes  Hypertension  MI, old  CAD (coronary artery disease)  S/P subdural hematoma evacuation  S/P CABG x 1      ALLERGIES:  Biaxin (Stomach Upset)    MEDICATIONS:  STANDING MEDICATIONS  amLODIPine   Tablet 5 milliGRAM(s) Oral daily  chlorhexidine 4% Liquid 1 Application(s) Topical <User Schedule>  dextrose 50% Injectable 12.5 Gram(s) IV Push once  dextrose 50% Injectable 25 Gram(s) IV Push once  dextrose 50% Injectable 25 Gram(s) IV Push once  furosemide    Tablet 20 milliGRAM(s) Oral daily  insulin glargine Injectable (LANTUS) 7 Unit(s) SubCutaneous at bedtime  insulin lispro Injectable (HumaLOG) 5 Unit(s) SubCutaneous before breakfast  insulin lispro Injectable (HumaLOG) 5 Unit(s) SubCutaneous before lunch  insulin lispro Injectable (HumaLOG) 5 Unit(s) SubCutaneous before dinner  losartan 25 milliGRAM(s) Oral daily  magnesium sulfate  IVPB 1 Gram(s) IV Intermittent once  metoprolol succinate ER 12.5 milliGRAM(s) Oral daily  simvastatin 20 milliGRAM(s) Oral at bedtime    PRN MEDICATIONS  dextrose 40% Gel 15 Gram(s) Oral once PRN  glucagon  Injectable 1 milliGRAM(s) IntraMuscular once PRN  ibuprofen  Tablet. 400 milliGRAM(s) Oral two times a day PRN    VITALS:   T(F): 98.1  HR: 61  BP: 122/70  RR: 17  SpO2: 96%    LABS:                        13.1   8.37  )-----------( 143      ( 11 Oct 2018 04:32 )             39.6     10-11    137  |  102  |  20  ----------------------------<  89  4.6   |  22  |  0.7    Ca    8.8      11 Oct 2018 04:32  Phos  2.6     10-11  Mg     1.9     10-11    TPro  6.9  /  Alb  3.5  /  TBili  1.6<H>  /  DBili  x   /  AST  31  /  ALT  19  /  AlkPhos  143<H>  10-11  PT/INR - ( 11 Oct 2018 06:20 )   PT: 18.00 sec;   INR: 1.68 ratio    PTT - ( 11 Oct 2018 06:20 )  PTT:57.3 sec    CARDIAC MARKERS ( 10 Oct 2018 05:00 )  x     / <0.01 ng/mL / x     / x     / x          RADIOLOGY:  < from: MELBA w/Probe Placement (10.04.18 @ 11:21) >   1. Left ventricular ejection fraction, by visual estimation, is 45 to   50%.   2. Mildly decreased global left ventricular systolic function.   3. Basal and mid inferior wall is abnormal as described above.   4. Mildlyincreased LV wall thickness.   5. IAS intact wasnt evaluated for shunt.   6. Mildly enlarged left atrium.     7. Degenerative mitral valve.   8. Moderate to severe mitral annular calcification.   9. Severe mitral valve regurgitation.  10. Thickening and calcification of the anterior and posterior mitral   valve leaflets.  11. MVA from subgastric views by palnimetry is 4.0 Vena contracta is .73   on image 6,l MV coapt appears to be .4 mm on image 21, posterior leaflet   length appears to be between 11 and 14 cm, Tented MV is .93cm ERO of .58   with a RV of 58cc allc/w severe MR.  12. Moderate-severe tricuspid regurgitation.  13. PSAP at least 60.  14. No evidence of aortic stenosis.  15. No left atrial appendage thrombus and decreased left atrial appendage   velocities.  16. Simple atheroma seen in the aortic arch.      < from: Xray Chest 1 View- PORTABLE-Routine (10.11.18 @ 05:17) >  Findings:    Support devices: None.    Cardiac/mediastinum/hilum: Stable enlarged cardiac silhouette. Post   median sternotomy.    Lung parenchyma/Pleura: Within normal limits.    Skeleton/soft tissues: Unchanged.    Impression:      No radiographic evidence of acute cardiopulmonary disease.    Stable examination.      < end of copied text >        PHYSICAL EXAM:  GEN: No acute distress  LUNGS: Clear to auscultation bilaterally   HEART: S1/S2 present. Irregular Irregular rate and rhythm  ABD: Soft, non-tender, non-distended. Bowel sounds present  EXT: NC/NC/NE/2+PP/MARQUEZ/Skin Intact. L groin soft, no hematoma L Knee swelling improved,  NEURO: AAOX3    Intravenous access:   NG tube:   Arcos cathter:

## 2018-10-11 NOTE — CHART NOTE - NSCHARTNOTEFT_GEN_A_CORE
Patient returned from PACU s/p AICD placement hypotensive 60's/40's Pulse 90's  asymptomatic O2 sat 94 % slightly drowsy from anesthesia, but mentating well. Anesthesia gave 1cc of levophed and started 1L of NS fluid bolus, patient is fluid responsive bp now 106/54 P80 bedside echo was ordered, EKG, Stat CBC, BMP, Mg2+ Phos+, and troponin sent STAT.     PHYSICAL EXAM:  GEN: No acute distress  LUNGS: Clear to auscultation bilaterally   HEART: S1/S2 present. Irregular Irregular rate and rhythm  ABD: Soft, non-tender, non-distended. Bowel sounds present  EXT: NC/NC/NE/2+PP/MARQUEZ/Skin Intact  NEURO: AAOX3, no focal defecits    A/P  f/u Echo, labs, continue with NS @50cc for 4-6 hrs  family notified  continue CCU monitoring

## 2018-10-11 NOTE — BRIEF OPERATIVE NOTE - PROCEDURE
<<-----Click on this checkbox to enter Procedure FRANSICO implantation  10/11/2018    Active  FROSELL

## 2018-10-11 NOTE — PROGRESS NOTE ADULT - ASSESSMENT
This is an 78 year old male who presented for elective MELBA for further evaluation of his MR. During the MELBA procedure, patient developed sustained VTach x2 with stable BP. He received in total two shocks of 100 joules, one dose of IV lidocaine, and 150 mg of IV amiodarone. Since, the IV amiodarone drip has been discontinued. Admitted to CCU for further cardiac monitoring.    Ventricular Arrhythmia during MELBA  - If reoccurs, give lidocaine bolus 50mg IV, then 1mg/min  -AICD placement in AM 10/11    Mitral regurgitation, Severe  - s/p Cardiac Cath, no stents  LVEF%: 35%  Normal Coronary Arteries  Luminal Irregularities  Non-obstructive CAD  -INR 1.86  - CE negative to date    Atrial fibrallation  - Holding coumadin for AICD, call Dr. Ruvalcaba and EP to ask when to restart  -Metoprolol XL 12.5 QD  - No amio as QTC prolonged    R Knee Gout  Patient has hx of gout   Ibuprofen 400mg BID    Elevated T-Bili  RUQ Sono negative for pathology  T-bili 1.6  Alkphos 143  trending down    Diabetes Mellitus  -Basal/Bolus insulin with SS 15/5  - stable FS glucose    HTN/HLD  Lasix 20mg qd  Metoprolol XL 12.5 QD  Amlodipine 5mg qd  Losartan 25mg qd  Simvastatin 20mg at bedtime  _________________________________________  Fluid: PO intake  Nutrition: Carb Consistent  Electrolytes:      Sodium - NL      Potassium - NL      Bicarb - NL      Chloride - NL      Magnesium - NL      Phosphorus - NL      GI PPX:                                   (X) Not indicated;  () Pantoprazole 40mg Daily    DVT PPX:  () Not indicated;  () Heparin 5000 mg SubQ;  () Lovenox 40 mg SubQ;  (X) SCDs    ACTIVITY:  () Ad Shazia  /  (X) Increase as Tolerated  /  () OOB w/ assist  /  () Bed Rest    BMI:  Height (cm): 177.8 (10-04)  Weight (kg): 78.925 (10-04)  BMI (kg/m2): 25 (10-04)    DISPO:  Patient to be discharged when condition(s) optimized.    (X) Discussion with patient and/or family regarding goals of care.  (X) Discussed Case and Plan with the Medical Attending.    |::::::::::::::::::::::::::::| CODE STATUS |::::::::::::::::::::::::::::|        (X) FULL     ~     () DNR     ~     () DNI  |:::::::::::::::::::::::::::::::::::::::::::::::::::::::::::::::::::::::::::::::::::::::::|    ------------------------------------------------------------------------------------------------------------

## 2018-10-11 NOTE — DIETITIAN INITIAL EVALUATION ADULT. - OTHER INFO
Pt presented for elective MELBA to eval MR but experienced VTACH during procedure and admitted to CCU. S/p L cardiac cath 10/9. Pt awaiting AICD today 10/11. Afib--Coumadin held for AICD placement. R knee gout. Elevated total bilirubin--RUQ sono negative for pathology. T2DN. HTN. HLD. Reason for assessment: LOS.

## 2018-10-12 ENCOUNTER — TRANSCRIPTION ENCOUNTER (OUTPATIENT)
Age: 78
End: 2018-10-12

## 2018-10-12 VITALS — DIASTOLIC BLOOD PRESSURE: 65 MMHG | SYSTOLIC BLOOD PRESSURE: 123 MMHG | HEART RATE: 58 BPM | RESPIRATION RATE: 22 BRPM

## 2018-10-12 LAB
ALBUMIN SERPL ELPH-MCNC: 3.3 G/DL — LOW (ref 3.5–5.2)
ALP SERPL-CCNC: 126 U/L — HIGH (ref 30–115)
ALT FLD-CCNC: 19 U/L — SIGNIFICANT CHANGE UP (ref 0–41)
ANION GAP SERPL CALC-SCNC: 11 MMOL/L — SIGNIFICANT CHANGE UP (ref 7–14)
ANION GAP SERPL CALC-SCNC: 15 MMOL/L — HIGH (ref 7–14)
AST SERPL-CCNC: 34 U/L — SIGNIFICANT CHANGE UP (ref 0–41)
BILIRUB SERPL-MCNC: 1.5 MG/DL — HIGH (ref 0.2–1.2)
BUN SERPL-MCNC: 24 MG/DL — HIGH (ref 10–20)
BUN SERPL-MCNC: 28 MG/DL — HIGH (ref 10–20)
CALCIUM SERPL-MCNC: 8.9 MG/DL — SIGNIFICANT CHANGE UP (ref 8.5–10.1)
CALCIUM SERPL-MCNC: 9.2 MG/DL — SIGNIFICANT CHANGE UP (ref 8.5–10.1)
CHLORIDE SERPL-SCNC: 101 MMOL/L — SIGNIFICANT CHANGE UP (ref 98–110)
CHLORIDE SERPL-SCNC: 98 MMOL/L — SIGNIFICANT CHANGE UP (ref 98–110)
CO2 SERPL-SCNC: 23 MMOL/L — SIGNIFICANT CHANGE UP (ref 17–32)
CO2 SERPL-SCNC: 25 MMOL/L — SIGNIFICANT CHANGE UP (ref 17–32)
CREAT SERPL-MCNC: 0.8 MG/DL — SIGNIFICANT CHANGE UP (ref 0.7–1.5)
CREAT SERPL-MCNC: 0.9 MG/DL — SIGNIFICANT CHANGE UP (ref 0.7–1.5)
GLUCOSE BLDC GLUCOMTR-MCNC: 104 MG/DL — HIGH (ref 70–99)
GLUCOSE BLDC GLUCOMTR-MCNC: 227 MG/DL — HIGH (ref 70–99)
GLUCOSE BLDC GLUCOMTR-MCNC: 85 MG/DL — SIGNIFICANT CHANGE UP (ref 70–99)
GLUCOSE SERPL-MCNC: 81 MG/DL — SIGNIFICANT CHANGE UP (ref 70–99)
GLUCOSE SERPL-MCNC: 96 MG/DL — SIGNIFICANT CHANGE UP (ref 70–99)
HCT VFR BLD CALC: 39.2 % — LOW (ref 42–52)
HGB BLD-MCNC: 13.1 G/DL — LOW (ref 14–18)
INR BLD: 1.75 RATIO — HIGH (ref 0.65–1.3)
MAGNESIUM SERPL-MCNC: 2 MG/DL — SIGNIFICANT CHANGE UP (ref 1.8–2.4)
MAGNESIUM SERPL-MCNC: 2.1 MG/DL — SIGNIFICANT CHANGE UP (ref 1.8–2.4)
MCHC RBC-ENTMCNC: 32.2 PG — HIGH (ref 27–31)
MCHC RBC-ENTMCNC: 33.4 G/DL — SIGNIFICANT CHANGE UP (ref 32–37)
MCV RBC AUTO: 96.3 FL — HIGH (ref 80–94)
NRBC # BLD: 0 /100 WBCS — SIGNIFICANT CHANGE UP (ref 0–0)
PHOSPHATE SERPL-MCNC: 3.5 MG/DL — SIGNIFICANT CHANGE UP (ref 2.1–4.9)
PLATELET # BLD AUTO: 176 K/UL — SIGNIFICANT CHANGE UP (ref 130–400)
POTASSIUM SERPL-MCNC: 4.8 MMOL/L — SIGNIFICANT CHANGE UP (ref 3.5–5)
POTASSIUM SERPL-MCNC: 5.8 MMOL/L — HIGH (ref 3.5–5)
POTASSIUM SERPL-SCNC: 4.8 MMOL/L — SIGNIFICANT CHANGE UP (ref 3.5–5)
POTASSIUM SERPL-SCNC: 5.8 MMOL/L — HIGH (ref 3.5–5)
PROT SERPL-MCNC: 6.5 G/DL — SIGNIFICANT CHANGE UP (ref 6–8)
PROTHROM AB SERPL-ACNC: 18.8 SEC — HIGH (ref 9.95–12.87)
RBC # BLD: 4.07 M/UL — LOW (ref 4.7–6.1)
RBC # FLD: 14.5 % — SIGNIFICANT CHANGE UP (ref 11.5–14.5)
SODIUM SERPL-SCNC: 136 MMOL/L — SIGNIFICANT CHANGE UP (ref 135–146)
SODIUM SERPL-SCNC: 137 MMOL/L — SIGNIFICANT CHANGE UP (ref 135–146)
WBC # BLD: 10.99 K/UL — HIGH (ref 4.8–10.8)
WBC # FLD AUTO: 10.99 K/UL — HIGH (ref 4.8–10.8)

## 2018-10-12 RX ORDER — DEXTROSE 50 % IN WATER 50 %
50 SYRINGE (ML) INTRAVENOUS ONCE
Qty: 0 | Refills: 0 | Status: COMPLETED | OUTPATIENT
Start: 2018-10-12 | End: 2018-10-12

## 2018-10-12 RX ORDER — ACETAMINOPHEN 500 MG
2 TABLET ORAL
Qty: 84 | Refills: 0 | OUTPATIENT
Start: 2018-10-12 | End: 2018-10-18

## 2018-10-12 RX ORDER — INSULIN HUMAN 100 [IU]/ML
10 INJECTION, SOLUTION SUBCUTANEOUS ONCE
Qty: 0 | Refills: 0 | Status: DISCONTINUED | OUTPATIENT
Start: 2018-10-12 | End: 2018-10-12

## 2018-10-12 RX ORDER — INSULIN HUMAN 100 [IU]/ML
10 INJECTION, SOLUTION SUBCUTANEOUS ONCE
Qty: 0 | Refills: 0 | Status: COMPLETED | OUTPATIENT
Start: 2018-10-12 | End: 2018-10-12

## 2018-10-12 RX ORDER — METOPROLOL TARTRATE 50 MG
1 TABLET ORAL
Qty: 0 | Refills: 0 | COMMUNITY

## 2018-10-12 RX ORDER — METOPROLOL TARTRATE 50 MG
0.5 TABLET ORAL
Qty: 15 | Refills: 0 | OUTPATIENT
Start: 2018-10-12 | End: 2018-11-10

## 2018-10-12 RX ADMIN — Medication 5 UNIT(S): at 12:44

## 2018-10-12 RX ADMIN — CHLORHEXIDINE GLUCONATE 1 APPLICATION(S): 213 SOLUTION TOPICAL at 06:29

## 2018-10-12 RX ADMIN — AMLODIPINE BESYLATE 5 MILLIGRAM(S): 2.5 TABLET ORAL at 06:28

## 2018-10-12 RX ADMIN — Medication 100 MILLIGRAM(S): at 15:04

## 2018-10-12 RX ADMIN — Medication 12.5 MILLIGRAM(S): at 06:28

## 2018-10-12 RX ADMIN — Medication 20 MILLIGRAM(S): at 06:28

## 2018-10-12 RX ADMIN — Medication 100 MILLIGRAM(S): at 06:28

## 2018-10-12 RX ADMIN — Medication 50 MILLILITER(S): at 11:07

## 2018-10-12 RX ADMIN — INSULIN HUMAN 10 UNIT(S): 100 INJECTION, SOLUTION SUBCUTANEOUS at 11:06

## 2018-10-12 RX ADMIN — LOSARTAN POTASSIUM 25 MILLIGRAM(S): 100 TABLET, FILM COATED ORAL at 06:28

## 2018-10-12 NOTE — PROGRESS NOTE ADULT - SUBJECTIVE AND OBJECTIVE BOX
POD # 1 s/p AICD implantation for ischemic cardiomyopathy    patient resting comfortably, BP on low side being followed by primary service.    No left arm swellig, pressure dressing removed, tegaderm in place, no hematoma or swelling    Device checked and functioning well    From CTS stand point PT clear for d/c.    Follow up 10/16/18 [facility] Office at [address] [place] [address], call [phone] on Monday to make an appointment.    do not lift left arm above shoulder.  take coumadin as instructed  call office for increasing, swelling, pain or drainage around incision  call office for temperature greater than 101  go to ER if device fires  may shower with dressing in place  Tylenol for pain

## 2018-10-12 NOTE — DISCHARGE NOTE ADULT - CARE PROVIDER_API CALL
Shamar Griffiths), Cardiology; Internal Medicine  [address] [facility]  [address]  [place] [address]  Phone: [phone]  Fax: [phone]    Coleman Soria), Surgery; Thoracic and Cardiac Surgery  [address] [facility]  [address]  [place] [address]  Phone: [phone]  Fax: [phone]    Irving Velez), Internal Medicine  [address] [place] [address]  Phone: [phone]  Fax: [phone]

## 2018-10-12 NOTE — PROGRESS NOTE ADULT - REASON FOR ADMISSION
Josselyn regurgitation
VT
VT DURING MELBA
Ventricular arrythmia
ventricular tachycardia
ventricular tachycardia
VT
VT

## 2018-10-12 NOTE — DISCHARGE NOTE ADULT - MEDICATION SUMMARY - MEDICATIONS TO TAKE
I will START or STAY ON the medications listed below when I get home from the hospital:    acetaminophen 325 mg oral tablet  -- 2 tab(s) by mouth every 4 hours, As Needed for pain  -- Indication: For Pain    losartan 25 mg oral tablet  -- 1 tab(s) by mouth once a day  -- Indication: For High Blood pressure    warfarin 2.5 mg oral tablet  -- 1 tab(s) by mouth once a day  -- Indication: For Blood thinner Afib    metFORMIN 500 mg oral tablet, extended release  -- 1 tab(s) by mouth once a day  -- Indication: For Diabetes    simvastatin 20 mg oral tablet  -- 1 tab(s) by mouth once a day (at bedtime)  -- Indication: For High Cholesterol    ezetimibe 10 mg oral tablet  -- 1 tab(s) by mouth once a day  -- Indication: For High Cholesterol    Toprol-XL 25 mg oral tablet, extended release  -- 0.5 tab(s) by mouth once a day   PLEASE TAKE 12.5mg daily  -- It is very important that you take or use this exactly as directed.  Do not skip doses or discontinue unless directed by your doctor.  May cause drowsiness.  Alcohol may intensify this effect.  Use care when operating dangerous machinery.  Some non-prescription drugs may aggravate your condition.  Read all labels carefully.  If a warning appears, check with your doctor before taking.  Swallow whole.  Do not crush.  Take with food or milk.  This drug may impair the ability to drive or operate machinery.  Use care until you become familiar with its effects.    -- Indication: For Heart Disease    furosemide 20 mg oral tablet  -- 1 tab(s) by mouth once a day  -- Indication: For Water pill    CoQ10 300 mg oral capsule  -- 1 cap(s) by mouth once a day  -- Indication: For Vitamin

## 2018-10-12 NOTE — DISCHARGE NOTE ADULT - PLAN OF CARE
Management and follow up with Electrophysiologist, Cardiologist, and Primary Care Doctor You were admitted for cardiac arrythmia during an elective Trans Esophageal Echocardiogram. You had a AICD placed. You will follow up with Dr. Griffiths your cardiologist within one week and Follow up 10/16/18 [facility] Office at [address] [place] [address], call [phone] on Monday to make an appointment. Please also see your primary care doctor within 7-10 days of discharge. Do not lift left arm above shoulder.  take coumadin as instructed and please get your INR Coumadin level checked early next week. Call office for increasing, swelling, pain or drainage around incision call office for temperature greater than 101 go to ER if device fires may shower with dressing in place. Tylenol for pain, if feeling worse with dizziness, shortness of breathe, chest pain or palpitations, please return to the Emergency Room at [facility] for further evaluation.     We changed the dose of your metoprolol, please continue now with Toprol XL 12.5mg daily.

## 2018-10-12 NOTE — PROGRESS NOTE ADULT - SUBJECTIVE AND OBJECTIVE BOX
SUBJ:  78-yo male with h/o CAD, CABG, severe MR now. Sustained VT during MELBA, LHC since then showed severe CAD, patient LIMA to LAD, occluded venous grafts. S/p ICD placement yesterday. No complaints today but BP low in AM today.    MEDICATIONS  (STANDING):  ceFAZolin   IVPB 1000 milliGRAM(s) IV Intermittent every 8 hours  chlorhexidine 4% Liquid 1 Application(s) Topical <User Schedule>  dextrose 50% Injectable 12.5 Gram(s) IV Push once  dextrose 50% Injectable 25 Gram(s) IV Push once  dextrose 50% Injectable 25 Gram(s) IV Push once  furosemide    Tablet 20 milliGRAM(s) Oral daily  insulin glargine Injectable (LANTUS) 7 Unit(s) SubCutaneous at bedtime  insulin lispro Injectable (HumaLOG) 5 Unit(s) SubCutaneous before breakfast  insulin lispro Injectable (HumaLOG) 5 Unit(s) SubCutaneous before lunch  insulin lispro Injectable (HumaLOG) 5 Unit(s) SubCutaneous before dinner  losartan 25 milliGRAM(s) Oral daily  metoprolol succinate ER 12.5 milliGRAM(s) Oral daily  norepinephrine Infusion 0.05 MICROgram(s)/kG/Min (7.219 mL/Hr) IV Continuous <Continuous>  simvastatin 20 milliGRAM(s) Oral at bedtime  sodium chloride 0.9%. 1000 milliLiter(s) (50 mL/Hr) IV Continuous <Continuous>    MEDICATIONS  (PRN):  acetaminophen   Tablet .. 650 milliGRAM(s) Oral every 4 hours PRN Temp greater or equal to 38C (100.4F), Mild Pain (1 - 3)  dextrose 40% Gel 15 Gram(s) Oral once PRN Blood Glucose LESS THAN 70 milliGRAM(s)/deciliter  glucagon  Injectable 1 milliGRAM(s) IntraMuscular once PRN Glucose LESS THAN 70 milligrams/deciliter  oxyCODONE    IR 5 milliGRAM(s) Oral every 6 hours PRN Severe Pain (7 - 10)        Vital Signs Last 24 Hrs  T(C): 36.1 (12 Oct 2018 08:00), Max: 36.7 (12 Oct 2018 00:00)  T(F): 97 (12 Oct 2018 08:00), Max: 98 (12 Oct 2018 00:00)  HR: 54 (12 Oct 2018 12:00) (54 - 88)  BP: 105/49 (12 Oct 2018 12:00) (52/37 - 122/70)  BP(mean): 83 (12 Oct 2018 12:00) (46 - 95)  RR: 20 (12 Oct 2018 12:00) (14 - 25)  SpO2: 98% (12 Oct 2018 10:00) (92% - 99%)    REVIEW OF SYSTEMS:  CONSTITUTIONAL: No fever, weight loss, or fatigue  Patient denies chest pain, shortness of breath or syncopal episodes.       PHYSICAL EXAM:  · CONSTITUTIONAL:	Well-developed, well nourished,   BMI-  · RESPIRATORY:   breath sounds equal; good air movement; respirations non-labored; clear to auscultation bilaterally; no rales, rhonchi or wheezing  · CARDIOVASCULAR	irregular rhythm, soft SM at the apex, no gallop; ICD site covered by pressure dressing  · EXTREMITIES: No cyanosis, clubbing or edema    	  TELEMETRY: A-fib, VR controlled    LABS:                        13.1   10.99 )-----------( 176      ( 12 Oct 2018 05:48 )             39.2     10-12    137  |  101  |  24<H>  ----------------------------<  96  5.8<H>   |  25  |  0.9    Ca    8.9      12 Oct 2018 05:48  Phos  3.5     10-12  Mg     2.1     10-12    TPro  6.5  /  Alb  3.3<L>  /  TBili  1.5<H>  /  DBili  x   /  AST  34  /  ALT  19  /  AlkPhos  126<H>  10-12    CARDIAC MARKERS ( 11 Oct 2018 16:45 )  x     / <0.01 ng/mL / x     / x     / 3.2 ng/mL      PT/INR - ( 12 Oct 2018 05:48 )   PT: 18.80 sec;   INR: 1.75 ratio         PTT - ( 11 Oct 2018 06:20 )  PTT:57.3 sec    I&O's Summary    11 Oct 2018 07:01  -  12 Oct 2018 07:00  --------------------------------------------------------  IN: 2430 mL / OUT: 800 mL / NET: 1630 mL    12 Oct 2018 07:01  -  12 Oct 2018 13:08  --------------------------------------------------------  IN: 100 mL / OUT: 100 mL / NET: 0 mL      BNP  RADIOLOGY & ADDITIONAL STUDIES:    IMPRESSION AND PLAN:  CAD, s/p CABG, occluded venous grafts, no angina.  Severe MR, mild LV dysfunction.  VT, s/p ICD.  Unclear etiology of hypotension today (? blood loss during ICD implantation).    Recommend:  Portable ECHO done by cardiology fellow, no pericardial effusion.  Will repeat CXR today.  Agree with Losartan 25 mg and Toprol 12.5 mg, hold Amlodipine for now.  OOB to chair, ambulate.  Can d/c home in the afternoon if BP improves and CXR unchanged.  F/u with Dr. Griffiths next week.

## 2018-10-12 NOTE — DISCHARGE NOTE ADULT - OTHER SIGNIFICANT FINDINGS
< from: MELBA w/Probe Placement (10.04.18 @ 11:21) >   1. Left ventricular ejection fraction, by visual estimation, is 45 to   50%.   2. Mildly decreased global left ventricular systolic function.   3. Basal and mid inferior wall is abnormal as described above.   4. Mildlyincreased LV wall thickness.   5. IAS intact wasnt evaluated for shunt.   6. Mildly enlarged left atrium.     7. Degenerative mitral valve.   8. Moderate to severe mitral annular calcification.   9. Severe mitral valve regurgitation.  10. Thickening and calcification of the anterior and posterior mitral valve leaflets.  11. MVA from subgastric views by palnimetry is 4.0 Vena contracta is .73   on image 6,l MV coapt appears to be .4 mm on image 21, posterior leaflet   length appears to be between 11 and 14 cm, Tented MV is .93cm ERO of .58   with a RV of 58cc allc/w severe MR.  12. Moderate-severe tricuspid regurgitation.  13. PSAP at least 60.  14. No evidence of aortic stenosis.  15. No left atrial appendage thrombus and decreased left atrial appendage   velocities.  16. Simple atheroma seen in the aortic arch.  valve leaflets.

## 2018-10-12 NOTE — DISCHARGE NOTE ADULT - HOSPITAL COURSE
Patient is a 78y old Male who presents with a chief complaint of VT (07 Oct 2018 10:49)    Currently admitted to medicine with the primary diagnosis of  Vtach during his elective MELBA evaluating for severe MR.     Assessment and Plan:   · Assessment		  This is an 78 year old male who presented for elective MELBA for further evaluation of his MR. During the MELBA procedure, patient developed sustained VTach x2 with stable BP. He received in total two shocks of 100 joules, one dose of IV lidocaine, and 150 mg of IV amiodarone. Since, the IV amiodarone drip has been discontinued. Admitted to CCU for further cardiac monitoring.    Ventricular Arrhythmia during MELBA  - If reoccurs, give lidocaine bolus 50mg IV, then 1mg/min  -AICD placement in AM 10/11    Mitral regurgitation, Severe  - s/p Cardiac Cath, no stents  LVEF%: 35%  Normal Coronary Arteries  Luminal Irregularities  Non-obstructive CAD  -INR 1.86  - CE negative to date    Atrial fibrallation  -ok to restart Coumadin 2.5mg daily  INR 1.75  -Metoprolol XL 12.5 QD  - No amio as QTC prolonged    R Knee Gout  Patient has hx of gout   improved   will follow up with PMD    Elevated T-Bili  RUQ Sono negative for pathology  T-bili 1.6  Alkphos 143  trending down    Diabetes Mellitus  -Basal/Bolus insulin with SS 15/5  - stable FS glucose    HTN/HLD  Lasix 20mg qd  Metoprolol XL 12.5 QD  Losartan 25mg qd  Simvastatin 20mg at bedtime    Patient was hypotensive 60/40's Asymptomatic after AICD placement, likely anesthesia delayed reaction, improved with 1L NS and time over night his blood pressure came back to baseline. ECHO done to rule out pericardial effusion CXR WNL, patient doing well and ready for discharge.

## 2018-10-12 NOTE — DISCHARGE NOTE ADULT - CARE PROVIDERS DIRECT ADDRESSES
,DirectAddress_Unknown,DirectAddress_Unknown,[email or web address]

## 2018-10-12 NOTE — DISCHARGE NOTE ADULT - CARE PLAN
Principal Discharge DX:	Ventricular tachyarrhythmia  Goal:	Management and follow up with Electrophysiologist, Cardiologist, and Primary Care Doctor  Assessment and plan of treatment:	You were admitted for cardiac arrythmia during an elective Trans Esophageal Echocardiogram. You had a AICD placed. You will follow up with Dr. Griffiths your cardiologist within one week and Follow up 10/16/18 [facility]ella Carbajal Office at [address] [place] [address], call [phone] on Monday to make an appointment. Please also see your primary care doctor within 7-10 days of discharge. Do not lift left arm above shoulder.  take coumadin as instructed and please get your INR Coumadin level checked early next week. Call office for increasing, swelling, pain or drainage around incision call office for temperature greater than 101 go to ER if device fires may shower with dressing in place. Tylenol for pain, if feeling worse with dizziness, shortness of breathe, chest pain or palpitations, please return to the Emergency Room at [facility] for further evaluation.     We changed the dose of your metoprolol, please continue now with Toprol XL 12.5mg daily.

## 2018-10-12 NOTE — DISCHARGE NOTE ADULT - PATIENT PORTAL LINK FT
You can access the [email or web address] Portal, offered by [facility], by registering with the following website: [email or web address]

## 2018-10-15 PROBLEM — I10 ESSENTIAL (PRIMARY) HYPERTENSION: Chronic | Status: ACTIVE | Noted: 2018-10-04

## 2018-10-15 PROBLEM — I25.2 OLD MYOCARDIAL INFARCTION: Chronic | Status: ACTIVE | Noted: 2018-10-04

## 2018-10-15 PROBLEM — I34.0 NONRHEUMATIC MITRAL (VALVE) INSUFFICIENCY: Chronic | Status: ACTIVE | Noted: 2018-10-04

## 2018-10-15 PROBLEM — I25.10 ATHEROSCLEROTIC HEART DISEASE OF NATIVE CORONARY ARTERY WITHOUT ANGINA PECTORIS: Chronic | Status: ACTIVE | Noted: 2018-10-04

## 2018-10-15 PROBLEM — E11.9 TYPE 2 DIABETES MELLITUS WITHOUT COMPLICATIONS: Chronic | Status: ACTIVE | Noted: 2018-10-04

## 2018-10-15 PROBLEM — I48.91 UNSPECIFIED ATRIAL FIBRILLATION: Chronic | Status: ACTIVE | Noted: 2018-10-04

## 2018-10-16 ENCOUNTER — OUTPATIENT (OUTPATIENT)
Dept: OUTPATIENT SERVICES | Facility: HOSPITAL | Age: 78
LOS: 1 days | Discharge: HOME | End: 2018-10-16

## 2018-10-16 ENCOUNTER — APPOINTMENT (OUTPATIENT)
Dept: CARDIOTHORACIC SURGERY | Facility: CLINIC | Age: 78
End: 2018-10-16

## 2018-10-16 VITALS
DIASTOLIC BLOOD PRESSURE: 75 MMHG | RESPIRATION RATE: 14 BRPM | SYSTOLIC BLOOD PRESSURE: 135 MMHG | HEART RATE: 55 BPM | TEMPERATURE: 97.7 F

## 2018-10-16 DIAGNOSIS — I48.91 UNSPECIFIED ATRIAL FIBRILLATION: ICD-10-CM

## 2018-10-16 DIAGNOSIS — Z79.01 LONG TERM (CURRENT) USE OF ANTICOAGULANTS: ICD-10-CM

## 2018-10-16 DIAGNOSIS — Z95.1 PRESENCE OF AORTOCORONARY BYPASS GRAFT: Chronic | ICD-10-CM

## 2018-10-16 DIAGNOSIS — Z98.890 OTHER SPECIFIED POSTPROCEDURAL STATES: Chronic | ICD-10-CM

## 2018-10-16 LAB
POCT INR: 1.5 RATIO — HIGH (ref 0.9–1.2)
POCT PT: 17.7 SEC — HIGH (ref 10–13.4)

## 2018-10-19 ENCOUNTER — APPOINTMENT (OUTPATIENT)
Dept: CARDIOTHORACIC SURGERY | Facility: CLINIC | Age: 78
End: 2018-10-19
Payer: MEDICARE

## 2018-10-19 VITALS
TEMPERATURE: 97 F | DIASTOLIC BLOOD PRESSURE: 77 MMHG | BODY MASS INDEX: 24.34 KG/M2 | SYSTOLIC BLOOD PRESSURE: 132 MMHG | HEART RATE: 63 BPM | RESPIRATION RATE: 18 BRPM | HEIGHT: 70 IN | WEIGHT: 170 LBS | OXYGEN SATURATION: 96 %

## 2018-10-19 DIAGNOSIS — M75.120 COMPLETE ROTATOR CUFF TEAR OR RUPTURE OF UNSPECIFIED SHOULDER, NOT SPECIFIED AS TRAUMATIC: ICD-10-CM

## 2018-10-19 DIAGNOSIS — Z95.1 PRESENCE OF AORTOCORONARY BYPASS GRAFT: ICD-10-CM

## 2018-10-19 DIAGNOSIS — Z87.09 PERSONAL HISTORY OF OTHER DISEASES OF THE RESPIRATORY SYSTEM: ICD-10-CM

## 2018-10-19 DIAGNOSIS — Z86.79 PERSONAL HISTORY OF OTHER DISEASES OF THE CIRCULATORY SYSTEM: ICD-10-CM

## 2018-10-19 DIAGNOSIS — Z86.39 PERSONAL HISTORY OF OTHER ENDOCRINE, NUTRITIONAL AND METABOLIC DISEASE: ICD-10-CM

## 2018-10-19 PROCEDURE — 99215 OFFICE O/P EST HI 40 MIN: CPT

## 2018-10-22 PROBLEM — Z95.1 S/P CABG (CORONARY ARTERY BYPASS GRAFT): Status: RESOLVED | Noted: 2018-10-22 | Resolved: 2018-10-22

## 2018-10-22 PROBLEM — Z86.39 HISTORY OF DIABETES MELLITUS: Status: RESOLVED | Noted: 2018-10-22 | Resolved: 2018-10-22

## 2018-10-22 PROBLEM — M75.120 COMPLETE TEAR OF ROTATOR CUFF, UNSPECIFIED LATERALITY: Status: RESOLVED | Noted: 2018-10-22 | Resolved: 2018-10-22

## 2018-10-22 PROBLEM — Z86.79 HISTORY OF SUBDURAL HEMATOMA: Status: RESOLVED | Noted: 2018-10-22 | Resolved: 2018-10-22

## 2018-10-22 PROBLEM — Z87.09 HISTORY OF ASTHMA: Status: RESOLVED | Noted: 2018-10-22 | Resolved: 2018-10-22

## 2018-10-23 ENCOUNTER — OUTPATIENT (OUTPATIENT)
Dept: OUTPATIENT SERVICES | Facility: HOSPITAL | Age: 78
LOS: 1 days | Discharge: HOME | End: 2018-10-23

## 2018-10-23 DIAGNOSIS — I25.2 OLD MYOCARDIAL INFARCTION: ICD-10-CM

## 2018-10-23 DIAGNOSIS — T82.867A THROMBOSIS DUE TO CARDIAC PROSTHETIC DEVICES, IMPLANTS AND GRAFTS, INITIAL ENCOUNTER: ICD-10-CM

## 2018-10-23 DIAGNOSIS — M10.061 IDIOPATHIC GOUT, RIGHT KNEE: ICD-10-CM

## 2018-10-23 DIAGNOSIS — T41.205A ADVERSE EFFECT OF UNSPECIFIED GENERAL ANESTHETICS, INITIAL ENCOUNTER: ICD-10-CM
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
## 2018-10-23 DIAGNOSIS — I49.3 VENTRICULAR PREMATURE DEPOLARIZATION: ICD-10-CM

## 2018-10-23 DIAGNOSIS — E11.9 TYPE 2 DIABETES MELLITUS WITHOUT COMPLICATIONS: ICD-10-CM

## 2018-10-23 DIAGNOSIS — I25.810 ATHEROSCLEROSIS OF CORONARY ARTERY BYPASS GRAFT(S) WITHOUT ANGINA PECTORIS: ICD-10-CM

## 2018-10-23 DIAGNOSIS — I25.10 ATHEROSCLEROTIC HEART DISEASE OF NATIVE CORONARY ARTERY WITHOUT ANGINA PECTORIS: ICD-10-CM

## 2018-10-23 DIAGNOSIS — I45.81 LONG QT SYNDROME: ICD-10-CM

## 2018-10-23 DIAGNOSIS — Z98.890 OTHER SPECIFIED POSTPROCEDURAL STATES: Chronic | ICD-10-CM

## 2018-10-23 DIAGNOSIS — E78.5 HYPERLIPIDEMIA, UNSPECIFIED: ICD-10-CM

## 2018-10-23 DIAGNOSIS — R00.1 BRADYCARDIA, UNSPECIFIED: ICD-10-CM

## 2018-10-23 DIAGNOSIS — R79.1 ABNORMAL COAGULATION PROFILE: ICD-10-CM

## 2018-10-23 DIAGNOSIS — I07.1 RHEUMATIC TRICUSPID INSUFFICIENCY: ICD-10-CM

## 2018-10-23 DIAGNOSIS — Z88.3 ALLERGY STATUS TO OTHER ANTI-INFECTIVE AGENTS: ICD-10-CM

## 2018-10-23 DIAGNOSIS — R17 UNSPECIFIED JAUNDICE: ICD-10-CM

## 2018-10-23 DIAGNOSIS — I48.2 CHRONIC ATRIAL FIBRILLATION: ICD-10-CM

## 2018-10-23 DIAGNOSIS — I25.5 ISCHEMIC CARDIOMYOPATHY: ICD-10-CM

## 2018-10-23 DIAGNOSIS — I10 ESSENTIAL (PRIMARY) HYPERTENSION: ICD-10-CM

## 2018-10-23 DIAGNOSIS — Z95.1 PRESENCE OF AORTOCORONARY BYPASS GRAFT: Chronic | ICD-10-CM

## 2018-10-23 DIAGNOSIS — I47.2 VENTRICULAR TACHYCARDIA: ICD-10-CM

## 2018-10-23 DIAGNOSIS — I34.0 NONRHEUMATIC MITRAL (VALVE) INSUFFICIENCY: ICD-10-CM

## 2018-10-23 DIAGNOSIS — Z95.1 PRESENCE OF AORTOCORONARY BYPASS GRAFT: ICD-10-CM

## 2018-10-23 DIAGNOSIS — Z79.4 LONG TERM (CURRENT) USE OF INSULIN: ICD-10-CM

## 2018-10-23 DIAGNOSIS — I95.81 POSTPROCEDURAL HYPOTENSION: ICD-10-CM

## 2018-10-23 DIAGNOSIS — I24.0 ACUTE CORONARY THROMBOSIS NOT RESULTING IN MYOCARDIAL INFARCTION: ICD-10-CM

## 2018-10-23 DIAGNOSIS — Z79.01 LONG TERM (CURRENT) USE OF ANTICOAGULANTS: ICD-10-CM

## 2018-10-23 DIAGNOSIS — Z98.890 OTHER SPECIFIED POSTPROCEDURAL STATES: ICD-10-CM

## 2018-10-25 ENCOUNTER — OUTPATIENT (OUTPATIENT)
Dept: OUTPATIENT SERVICES | Facility: HOSPITAL | Age: 78
LOS: 1 days | Discharge: HOME | End: 2018-10-25

## 2018-10-25 ENCOUNTER — CHART COPY (OUTPATIENT)
Age: 78
End: 2018-10-25

## 2018-10-25 DIAGNOSIS — Z79.01 LONG TERM (CURRENT) USE OF ANTICOAGULANTS: ICD-10-CM

## 2018-10-25 DIAGNOSIS — I48.91 UNSPECIFIED ATRIAL FIBRILLATION: ICD-10-CM

## 2018-10-25 DIAGNOSIS — Z95.1 PRESENCE OF AORTOCORONARY BYPASS GRAFT: Chronic | ICD-10-CM

## 2018-10-25 DIAGNOSIS — Z98.890 OTHER SPECIFIED POSTPROCEDURAL STATES: Chronic | ICD-10-CM

## 2018-10-25 LAB
POCT INR: 3.2 RATIO — HIGH (ref 0.9–1.2)
POCT PT: 39 SEC — HIGH (ref 10–13.4)

## 2018-11-06 ENCOUNTER — OUTPATIENT (OUTPATIENT)
Dept: OUTPATIENT SERVICES | Facility: HOSPITAL | Age: 78
LOS: 1 days | Discharge: HOME | End: 2018-11-06

## 2018-11-06 ENCOUNTER — OUTPATIENT (OUTPATIENT)
Dept: OUTPATIENT SERVICES | Facility: HOSPITAL | Age: 78
LOS: 1 days | Discharge: HOME | End: 2018-11-06
Payer: MEDICARE

## 2018-11-06 DIAGNOSIS — Z79.01 LONG TERM (CURRENT) USE OF ANTICOAGULANTS: ICD-10-CM

## 2018-11-06 DIAGNOSIS — Z98.890 OTHER SPECIFIED POSTPROCEDURAL STATES: Chronic | ICD-10-CM

## 2018-11-06 DIAGNOSIS — E11.9 TYPE 2 DIABETES MELLITUS WITHOUT COMPLICATIONS: ICD-10-CM

## 2018-11-06 DIAGNOSIS — Z95.1 PRESENCE OF AORTOCORONARY BYPASS GRAFT: Chronic | ICD-10-CM

## 2018-11-06 DIAGNOSIS — I25.10 ATHEROSCLEROTIC HEART DISEASE OF NATIVE CORONARY ARTERY WITHOUT ANGINA PECTORIS: ICD-10-CM

## 2018-11-06 DIAGNOSIS — I48.91 UNSPECIFIED ATRIAL FIBRILLATION: ICD-10-CM

## 2018-11-06 LAB
POCT INR: 3 RATIO — HIGH (ref 0.9–1.2)
POCT PT: 35.8 SEC — HIGH (ref 10–13.4)

## 2018-11-07 ENCOUNTER — FORM ENCOUNTER (OUTPATIENT)
Age: 78
End: 2018-11-07

## 2018-11-07 ENCOUNTER — APPOINTMENT (OUTPATIENT)
Dept: CT IMAGING | Facility: HOSPITAL | Age: 78
End: 2018-11-07

## 2018-11-08 ENCOUNTER — APPOINTMENT (OUTPATIENT)
Dept: CARDIOTHORACIC SURGERY | Facility: CLINIC | Age: 78
End: 2018-11-08
Payer: MEDICARE

## 2018-11-08 ENCOUNTER — OUTPATIENT (OUTPATIENT)
Dept: OUTPATIENT SERVICES | Facility: HOSPITAL | Age: 78
LOS: 1 days | End: 2018-11-08
Payer: MEDICARE

## 2018-11-08 ENCOUNTER — APPOINTMENT (OUTPATIENT)
Dept: CT IMAGING | Facility: HOSPITAL | Age: 78
End: 2018-11-08
Payer: MEDICARE

## 2018-11-08 VITALS
WEIGHT: 168 LBS | OXYGEN SATURATION: 95 % | TEMPERATURE: 97 F | BODY MASS INDEX: 24.11 KG/M2 | RESPIRATION RATE: 18 BRPM | SYSTOLIC BLOOD PRESSURE: 146 MMHG | DIASTOLIC BLOOD PRESSURE: 67 MMHG | HEART RATE: 60 BPM

## 2018-11-08 DIAGNOSIS — Z95.1 PRESENCE OF AORTOCORONARY BYPASS GRAFT: Chronic | ICD-10-CM

## 2018-11-08 DIAGNOSIS — E11.9 TYPE 2 DIABETES MELLITUS WITHOUT COMPLICATIONS: ICD-10-CM

## 2018-11-08 DIAGNOSIS — Z98.890 OTHER SPECIFIED POSTPROCEDURAL STATES: Chronic | ICD-10-CM

## 2018-11-08 DIAGNOSIS — I25.10 ATHEROSCLEROTIC HEART DISEASE OF NATIVE CORONARY ARTERY WITHOUT ANGINA PECTORIS: ICD-10-CM

## 2018-11-08 DIAGNOSIS — Z02.9 ENCOUNTER FOR ADMINISTRATIVE EXAMINATIONS, UNSPECIFIED: ICD-10-CM

## 2018-11-08 LAB
ALBUMIN SERPL ELPH-MCNC: 4.2 G/DL — SIGNIFICANT CHANGE UP (ref 3.3–5)
ALP SERPL-CCNC: 146 U/L — HIGH (ref 40–120)
ALT FLD-CCNC: 18 U/L — SIGNIFICANT CHANGE UP (ref 10–45)
ANION GAP SERPL CALC-SCNC: 17 MMOL/L — SIGNIFICANT CHANGE UP (ref 5–17)
APTT BLD: 58.5 SEC — HIGH (ref 27.5–36.3)
AST SERPL-CCNC: 26 U/L — SIGNIFICANT CHANGE UP (ref 10–40)
BASOPHILS NFR BLD AUTO: 0.3 % — SIGNIFICANT CHANGE UP (ref 0–2)
BILIRUB SERPL-MCNC: 1 MG/DL — SIGNIFICANT CHANGE UP (ref 0.2–1.2)
BLD GP AB SCN SERPL QL: NEGATIVE — SIGNIFICANT CHANGE UP
BUN SERPL-MCNC: 17 MG/DL — SIGNIFICANT CHANGE UP (ref 7–23)
CALCIUM SERPL-MCNC: 9.9 MG/DL — SIGNIFICANT CHANGE UP (ref 8.4–10.5)
CHLORIDE SERPL-SCNC: 101 MMOL/L — SIGNIFICANT CHANGE UP (ref 96–108)
CO2 SERPL-SCNC: 23 MMOL/L — SIGNIFICANT CHANGE UP (ref 22–31)
CREAT SERPL-MCNC: 0.82 MG/DL — SIGNIFICANT CHANGE UP (ref 0.5–1.3)
EOSINOPHIL NFR BLD AUTO: 1.6 % — SIGNIFICANT CHANGE UP (ref 0–6)
GLUCOSE SERPL-MCNC: 147 MG/DL — HIGH (ref 70–99)
HCT VFR BLD CALC: 42.3 % — SIGNIFICANT CHANGE UP (ref 39–50)
HGB BLD-MCNC: 13.8 G/DL — SIGNIFICANT CHANGE UP (ref 13–17)
INR BLD: 3.05 — HIGH (ref 0.88–1.16)
LYMPHOCYTES # BLD AUTO: 26.2 % — SIGNIFICANT CHANGE UP (ref 13–44)
MCHC RBC-ENTMCNC: 32 PG — SIGNIFICANT CHANGE UP (ref 27–34)
MCHC RBC-ENTMCNC: 32.6 G/DL — SIGNIFICANT CHANGE UP (ref 32–36)
MCV RBC AUTO: 98.1 FL — SIGNIFICANT CHANGE UP (ref 80–100)
MONOCYTES NFR BLD AUTO: 8.3 % — SIGNIFICANT CHANGE UP (ref 2–14)
NEUTROPHILS NFR BLD AUTO: 63.6 % — SIGNIFICANT CHANGE UP (ref 43–77)
NT-PROBNP SERPL-SCNC: 1949 PG/ML — HIGH (ref 0–300)
PLATELET # BLD AUTO: 117 K/UL — LOW (ref 150–400)
POTASSIUM SERPL-MCNC: 4.4 MMOL/L — SIGNIFICANT CHANGE UP (ref 3.5–5.3)
POTASSIUM SERPL-SCNC: 4.4 MMOL/L — SIGNIFICANT CHANGE UP (ref 3.5–5.3)
PROT SERPL-MCNC: 8 G/DL — SIGNIFICANT CHANGE UP (ref 6–8.3)
PROTHROM AB SERPL-ACNC: 35.7 SEC — HIGH (ref 10–12.9)
RBC # BLD: 4.31 M/UL — SIGNIFICANT CHANGE UP (ref 4.2–5.8)
RBC # FLD: 14.9 % — SIGNIFICANT CHANGE UP (ref 10.3–16.9)
RH IG SCN BLD-IMP: NEGATIVE — SIGNIFICANT CHANGE UP
SODIUM SERPL-SCNC: 141 MMOL/L — SIGNIFICANT CHANGE UP (ref 135–145)
WBC # BLD: 7 K/UL — SIGNIFICANT CHANGE UP (ref 3.8–10.5)
WBC # FLD AUTO: 7 K/UL — SIGNIFICANT CHANGE UP (ref 3.8–10.5)

## 2018-11-08 PROCEDURE — 36415 COLL VENOUS BLD VENIPUNCTURE: CPT

## 2018-11-08 PROCEDURE — 86850 RBC ANTIBODY SCREEN: CPT

## 2018-11-08 PROCEDURE — 75572 CT HRT W/3D IMAGE: CPT | Mod: 26

## 2018-11-08 PROCEDURE — 99204 OFFICE O/P NEW MOD 45 MIN: CPT

## 2018-11-08 PROCEDURE — 86900 BLOOD TYPING SEROLOGIC ABO: CPT

## 2018-11-08 PROCEDURE — 93306 TTE W/DOPPLER COMPLETE: CPT | Mod: 26

## 2018-11-08 PROCEDURE — 74174 CTA ABD&PLVS W/CONTRAST: CPT | Mod: 26

## 2018-11-08 PROCEDURE — 93306 TTE W/DOPPLER COMPLETE: CPT

## 2018-11-08 PROCEDURE — 99215 OFFICE O/P EST HI 40 MIN: CPT

## 2018-11-08 PROCEDURE — 83051 HEMOGLOBIN PLASMA: CPT

## 2018-11-08 PROCEDURE — 74174 CTA ABD&PLVS W/CONTRAST: CPT

## 2018-11-08 PROCEDURE — 86901 BLOOD TYPING SEROLOGIC RH(D): CPT

## 2018-11-08 PROCEDURE — 75572 CT HRT W/3D IMAGE: CPT

## 2018-11-11 LAB — HGB FREE PLAS-MCNC: 1.7 MG/DL — SIGNIFICANT CHANGE UP

## 2018-11-13 NOTE — REVIEW OF SYSTEMS
[Fever] : no fever [Headache] : no headache [Chills] : no chills [Feeling Fatigued] : feeling fatigued [Shortness Of Breath] : no shortness of breath [Dyspnea on exertion] : dyspnea during exertion [Chest  Pressure] : no chest pressure [Chest Pain] : no chest pain [Lower Ext Edema] : lower extremity edema [Leg Claudication] : no intermittent leg claudication [Palpitations] : no palpitations [Negative] : Psychiatric

## 2018-11-13 NOTE — PHYSICAL EXAM
[Normal Appearance] : normal appearance [General Appearance - In No Acute Distress] : no acute distress [Normal Conjunctiva] : the conjunctiva exhibited no abnormalities [Normal Oral Mucosa] : normal oral mucosa [Normal Jugular Venous V Waves Present] : normal jugular venous V waves present [Respiration, Rhythm And Depth] : normal respiratory rhythm and effort [Auscultation Breath Sounds / Voice Sounds] : lungs were clear to auscultation bilaterally [Heart Rate And Rhythm] : heart rate and rhythm were normal [Heart Sounds] : normal S1 and S2 [Edema] : no peripheral edema present [FreeTextEntry1] : QUINCY best heard at the RSB.  [Bowel Sounds] : normal bowel sounds [Abdomen Soft] : soft [] : no hepato-splenomegaly [Abnormal Walk] : normal gait [Nail Clubbing] : no clubbing of the fingernails [Cyanosis, Localized] : no localized cyanosis [Skin Color & Pigmentation] : normal skin color and pigmentation [Oriented To Time, Place, And Person] : oriented to person, place, and time

## 2018-11-13 NOTE — HISTORY OF PRESENT ILLNESS
[FreeTextEntry1] : 78 year old male with a history of HTN, hyperlipidemia, DM-II (on oral medication), atrial fibrillation (on Coumadin), known CAD s/p 3V CABG at [place] in 1993 and chronic systolic and diastolic heart failure with history of VT s/p Medtronic AICD on 10/11/18 (EF 45%) and severe mitral regurgitation who presents for follow up. \par \par The patient reports progressively worsening SOB with exertion for the past year; he notes a significant decline on the past six months. His SOB is worst when walking up an incline or carrying any packages. The patient denies SOB at rest. He does experience a dry cough at night when laying down for bed; does not sleep propped up on pillows. The patient denies chest pain, PND, dizziness, syncope, LE edema and palpitations. \par \par At the beginning of October, the patient presented to [facility] for a MELBA to evaluate his mitral valve disease. During the MELBA, he went into VT twice and was defibrillated. The patient was admitted for further evaluation. The MELBA showed severe mitral regurgitation. He had a cardiac catheterization that showed a patent LIMA-LAD; two SVG grafts were occluded. The patient had a AICD placed on 10/11/18 without complications. Since his admission, the patient has been on daily Lasix (previously was taking 3-4 times a month when he noticed LE edema). \par \par The patient is living at home with mohamud rizzo. He remains independent in all ADLs.

## 2018-11-13 NOTE — REASON FOR VISIT
[Follow-Up - Clinic] : a clinic follow-up of [Spouse] : spouse [FreeTextEntry1] : mitral regurgitation.

## 2018-11-20 ENCOUNTER — OUTPATIENT (OUTPATIENT)
Dept: OUTPATIENT SERVICES | Facility: HOSPITAL | Age: 78
LOS: 1 days | Discharge: HOME | End: 2018-11-20

## 2018-11-20 DIAGNOSIS — Z79.01 LONG TERM (CURRENT) USE OF ANTICOAGULANTS: ICD-10-CM

## 2018-11-20 DIAGNOSIS — Z95.1 PRESENCE OF AORTOCORONARY BYPASS GRAFT: Chronic | ICD-10-CM

## 2018-11-20 DIAGNOSIS — I48.91 UNSPECIFIED ATRIAL FIBRILLATION: ICD-10-CM

## 2018-11-20 DIAGNOSIS — Z98.890 OTHER SPECIFIED POSTPROCEDURAL STATES: Chronic | ICD-10-CM

## 2018-11-20 LAB
POCT INR: 3.4 RATIO — HIGH (ref 0.9–1.2)
POCT PT: 40.7 SEC — HIGH (ref 10–13.4)

## 2018-11-27 ENCOUNTER — FORM ENCOUNTER (OUTPATIENT)
Age: 78
End: 2018-11-27

## 2018-11-28 ENCOUNTER — OUTPATIENT (OUTPATIENT)
Dept: OUTPATIENT SERVICES | Facility: HOSPITAL | Age: 78
LOS: 1 days | Discharge: HOME | End: 2018-11-28

## 2018-11-28 DIAGNOSIS — Z98.890 OTHER SPECIFIED POSTPROCEDURAL STATES: Chronic | ICD-10-CM

## 2018-11-28 DIAGNOSIS — I27.22 PULMONARY HYPERTENSION DUE TO LEFT HEART DISEASE: ICD-10-CM

## 2018-11-28 DIAGNOSIS — Z95.1 PRESENCE OF AORTOCORONARY BYPASS GRAFT: Chronic | ICD-10-CM

## 2018-11-30 ENCOUNTER — APPOINTMENT (OUTPATIENT)
Dept: CARDIOTHORACIC SURGERY | Facility: CLINIC | Age: 78
End: 2018-11-30
Payer: MEDICARE

## 2018-11-30 PROCEDURE — 99215 OFFICE O/P EST HI 40 MIN: CPT

## 2018-12-04 ENCOUNTER — OUTPATIENT (OUTPATIENT)
Dept: OUTPATIENT SERVICES | Facility: HOSPITAL | Age: 78
LOS: 1 days | Discharge: HOME | End: 2018-12-04

## 2018-12-04 VITALS
TEMPERATURE: 97.7 F | OXYGEN SATURATION: 96 % | HEART RATE: 52 BPM | SYSTOLIC BLOOD PRESSURE: 153 MMHG | RESPIRATION RATE: 18 BRPM | DIASTOLIC BLOOD PRESSURE: 70 MMHG

## 2018-12-04 DIAGNOSIS — Z95.1 PRESENCE OF AORTOCORONARY BYPASS GRAFT: Chronic | ICD-10-CM

## 2018-12-04 DIAGNOSIS — I48.91 UNSPECIFIED ATRIAL FIBRILLATION: ICD-10-CM

## 2018-12-04 DIAGNOSIS — Z79.01 LONG TERM (CURRENT) USE OF ANTICOAGULANTS: ICD-10-CM

## 2018-12-04 DIAGNOSIS — Z98.890 OTHER SPECIFIED POSTPROCEDURAL STATES: Chronic | ICD-10-CM

## 2018-12-04 LAB
POCT INR: 3 RATIO — HIGH (ref 0.9–1.2)
POCT PT: 35.7 SEC — HIGH (ref 10–13.4)

## 2018-12-18 ENCOUNTER — OUTPATIENT (OUTPATIENT)
Dept: OUTPATIENT SERVICES | Facility: HOSPITAL | Age: 78
LOS: 1 days | Discharge: HOME | End: 2018-12-18

## 2018-12-18 DIAGNOSIS — Z98.890 OTHER SPECIFIED POSTPROCEDURAL STATES: Chronic | ICD-10-CM

## 2018-12-18 DIAGNOSIS — Z95.1 PRESENCE OF AORTOCORONARY BYPASS GRAFT: Chronic | ICD-10-CM

## 2018-12-18 DIAGNOSIS — I48.91 UNSPECIFIED ATRIAL FIBRILLATION: ICD-10-CM

## 2018-12-18 DIAGNOSIS — Z79.01 LONG TERM (CURRENT) USE OF ANTICOAGULANTS: ICD-10-CM

## 2018-12-18 LAB
POCT INR: 3 RATIO — HIGH (ref 0.9–1.2)
POCT PT: 36.6 SEC — HIGH (ref 10–13.4)

## 2018-12-20 ENCOUNTER — MESSAGE (OUTPATIENT)
Age: 78
End: 2018-12-20

## 2019-01-08 ENCOUNTER — OUTPATIENT (OUTPATIENT)
Dept: OUTPATIENT SERVICES | Facility: HOSPITAL | Age: 79
LOS: 1 days | Discharge: HOME | End: 2019-01-08

## 2019-01-08 DIAGNOSIS — Z98.890 OTHER SPECIFIED POSTPROCEDURAL STATES: Chronic | ICD-10-CM

## 2019-01-08 DIAGNOSIS — Z95.1 PRESENCE OF AORTOCORONARY BYPASS GRAFT: Chronic | ICD-10-CM

## 2019-01-08 DIAGNOSIS — Z79.01 LONG TERM (CURRENT) USE OF ANTICOAGULANTS: ICD-10-CM

## 2019-01-08 DIAGNOSIS — I48.91 UNSPECIFIED ATRIAL FIBRILLATION: ICD-10-CM

## 2019-01-08 LAB
POCT INR: 3 RATIO — HIGH (ref 0.9–1.2)
POCT PT: 35.4 SEC — HIGH (ref 10–13.4)

## 2019-01-15 ENCOUNTER — APPOINTMENT (OUTPATIENT)
Dept: CARDIOTHORACIC SURGERY | Facility: CLINIC | Age: 79
End: 2019-01-15

## 2019-02-05 ENCOUNTER — OUTPATIENT (OUTPATIENT)
Dept: OUTPATIENT SERVICES | Facility: HOSPITAL | Age: 79
LOS: 1 days | Discharge: HOME | End: 2019-02-05

## 2019-02-05 DIAGNOSIS — I48.91 UNSPECIFIED ATRIAL FIBRILLATION: ICD-10-CM

## 2019-02-05 DIAGNOSIS — Z95.1 PRESENCE OF AORTOCORONARY BYPASS GRAFT: Chronic | ICD-10-CM

## 2019-02-05 DIAGNOSIS — Z79.01 LONG TERM (CURRENT) USE OF ANTICOAGULANTS: ICD-10-CM

## 2019-02-05 DIAGNOSIS — Z98.890 OTHER SPECIFIED POSTPROCEDURAL STATES: Chronic | ICD-10-CM

## 2019-02-05 LAB
POCT INR: 3.1 RATIO — HIGH (ref 0.9–1.2)
POCT PT: 36.9 SEC — HIGH (ref 10–13.4)

## 2019-02-11 DIAGNOSIS — I25.2 OLD MYOCARDIAL INFARCTION: ICD-10-CM

## 2019-02-11 NOTE — CONSULT LETTER
[Dear  ___] : Dear ~YANY, [Consult Letter:] : I had the pleasure of evaluating your patient, [unfilled]. [Please see my note below.] : Please see my note below. [Consult Closing:] : Thank you very much for allowing me to participate in the care of this patient.  If you have any questions, please do not hesitate to contact me. [Sincerely,] : Sincerely, [FreeTextEntry2] : Francois Alfred M.D.\par [facility]\par [facility] 9th floor\par [address]\par [place] [id number] [FreeTextEntry3] : Binu Calix MD\par Attending surgeon\par Cardiovascular and Thoracic Surgery\par , Cardiovascular and Thoracic Surgery\par [facility] [employer] [facility]\par

## 2019-02-11 NOTE — ASSESSMENT
[FreeTextEntry1] : Wale is a 78 year old male with a history of HTN, Hyperlipidemia, Atrial fibrillation on Coumadin, V-Tach S/P Medtrtonic AICD, Coronary Artery Disease S/P CABG 1993 at [facility], Heart Failure with EF of 45% and Severe Mitral Regurgitation. His symptoms of shortness of breath with exertion have been progressively worsening over the past year with significant decline over the past 6 months.

## 2019-02-11 NOTE — HISTORY OF PRESENT ILLNESS
[FreeTextEntry1] : Wale is a 78 year old male with a history of HTN, Hyperlipidemia, Atrial fibrillation on Coumadin, V-Tach S/P Medtrtonic AICD, Coronary Artery Disease S/P CABG 1993 at [facility], Heart Failure with EF of 45% and Severe Mitral Regurgitation. He was seen by Dr Alfred on 11/30/2018. At that time he was experiencing progressively worsening shortness of breath with exertion over the past year with  most notable decline in the past 6 months. His shortness of breath resolves with rest. He has a dry cough at night when lying down but it's not necessary to prop himself up with pillows.\par \par ECHO on 11/28/2018 revealed the following:\par \par  1. Left ventricular ejection fraction, by visual estimation, is 40 to 45%.\par  2. Mildly to moderately decreased global left ventricular systolic function.\par  3. Left atrial enlargement.\par  4. Right atrial enlargement.\par  5. Mild to moderate mitral valve regurgitation.\par  6. Thickening of the anterior mitral valve leaflet.\par  7. Mild-moderate tricuspid regurgitation.\par  8. Pulmonic valve regurgitation.\par  9. Estimated pulmonary artery systolic pressure is 88.3 mmHg assuming a \par right atrial pressure of 15 mmHg, which is consistent with severe \par pulmonary hypertension.\par \par PHYSICIAN INTERPRETATION:\par Left Ventricle: The left ventricular internal cavity size is normal. Left \par ventricular wall thickness is normal. There is no left ventricular \par hypertrophy. Global LV systolic function was mildly to moderately \par decreased. Left ventricular ejection fraction, by visual estimation, is \par 40 to 45%.\par Right Ventricle: The right ventricular size is mildly enlarged. RV \par systolic function is normal.\par Left Atrium: Left atrial enlargement.\par Right Atrium: Right atrial enlargement.\par Pericardium: There is no evidence of pericardial effusion.\par Mitral Valve: Mild to moderate mitral valve regurgitation is seen. \par Thickening of the anterior mitral valve leaflet. Peak transmitral mean \par gradient equals 1.8 mmHg, calculated mitral valve area by pressure half \par time equals 3.77 cm² consistent with No evidence of mitral stenosis. No \par evidence of mitral stenosis.\par Tricuspid Valve: Mild-moderate tricuspid regurgitation is visualized. \par Estimated pulmonary artery systolic pressure is 88.3 mmHg assuming a \par right atrial pressure of 15 mmHg, which is consistent with severe \par pulmonary hypertension.\par Aortic Valve: Trivial aortic valve regurgitation is seen. No evidence of \par aortic stenosis.\par Pulmonic Valve: Structurally normal pulmonic valve, with normal leaflet \par excursion. Mild pulmonic valve regurgitation.\par Aorta: The aortic root and ascending aorta are structurally normal, with \par no evidence of dilitation.\par Pulmonary Artery: The main pulmonary artery is normal in size.\par Venous: The inferior vena cava was dilated, with respiratory size \par variation less than 50%, consistent with elevated right atrial pressure.\par Additional Comments: A pacer wire is visualized in the right atrium and \par right ventricle.\par  \par

## 2019-02-14 ENCOUNTER — OUTPATIENT (OUTPATIENT)
Dept: OUTPATIENT SERVICES | Facility: HOSPITAL | Age: 79
LOS: 1 days | End: 2019-02-14
Payer: MEDICARE

## 2019-02-14 ENCOUNTER — NON-APPOINTMENT (OUTPATIENT)
Age: 79
End: 2019-02-14

## 2019-02-14 ENCOUNTER — APPOINTMENT (OUTPATIENT)
Dept: CARDIOTHORACIC SURGERY | Facility: CLINIC | Age: 79
End: 2019-02-14
Payer: MEDICARE

## 2019-02-14 ENCOUNTER — APPOINTMENT (OUTPATIENT)
Dept: CARDIOTHORACIC SURGERY | Facility: CLINIC | Age: 79
End: 2019-02-14

## 2019-02-14 VITALS
TEMPERATURE: 97.3 F | WEIGHT: 170 LBS | BODY MASS INDEX: 24.34 KG/M2 | HEART RATE: 52 BPM | OXYGEN SATURATION: 96 % | HEIGHT: 70 IN | RESPIRATION RATE: 16 BRPM | SYSTOLIC BLOOD PRESSURE: 171 MMHG | DIASTOLIC BLOOD PRESSURE: 81 MMHG

## 2019-02-14 VITALS — DIASTOLIC BLOOD PRESSURE: 74 MMHG | SYSTOLIC BLOOD PRESSURE: 153 MMHG

## 2019-02-14 DIAGNOSIS — Z98.890 OTHER SPECIFIED POSTPROCEDURAL STATES: Chronic | ICD-10-CM

## 2019-02-14 DIAGNOSIS — I34.0 NONRHEUMATIC MITRAL (VALVE) INSUFFICIENCY: ICD-10-CM

## 2019-02-14 DIAGNOSIS — Z95.1 PRESENCE OF AORTOCORONARY BYPASS GRAFT: Chronic | ICD-10-CM

## 2019-02-14 PROCEDURE — 93306 TTE W/DOPPLER COMPLETE: CPT | Mod: 26

## 2019-02-14 PROCEDURE — 99205 OFFICE O/P NEW HI 60 MIN: CPT

## 2019-02-14 PROCEDURE — C8929: CPT

## 2019-02-14 PROCEDURE — 93000 ELECTROCARDIOGRAM COMPLETE: CPT

## 2019-02-14 RX ORDER — LOSARTAN POTASSIUM 25 MG/1
25 TABLET, FILM COATED ORAL DAILY
Refills: 0 | Status: DISCONTINUED | COMMUNITY
End: 2019-02-14

## 2019-02-14 NOTE — HISTORY OF PRESENT ILLNESS
[FreeTextEntry1] : Wale is referred by my colleague Dr Alfred for evaluation in the setting of symptomatic mitral regurgitation.  He was evaluated by the Heart Team at [facility], at which time several treatment options were discussed and explored, including surgical MVR (with Dr Matthews), MitraClip (for which his anatomy was deemed unsuitable), and TMVR (he was a screen-failure for the Tendyne device trial due to potential for LVOTO).  He presents today to be evaluated for the APOLLO Trial, a TMVR study in which we are participating with the dINK TMVR system.\par \par In summary, his PMH includes HTN, HLD, T2DM, AF (on warfarin), CAD (s/p 3v CABG at [facility]), and previously reported moderate chronic systolic heart failure (LVEF 40-45%), with a prior ICD in 2018.  Angiography at [facility] in October 2018 (after developing VT requiring defibrillation during a MELBA) demonstrated a patent LIMA and 2 occluded vein grafts (I do not have the angiogram images or report, so I do not know the native anatomy, i.e. the status of the RCA and LCX).  \par \par He developed worsening dyspnea a year ago, which prompted the work up that revealed mitral regurgitation, per his report.  He has no angina, dizziness, LH, palpitations, or syncope.  He has noted fatigue and a decline in his functional capacity over the prior 6 months.  His significant other notes a "dramatic change since last January", just over a year ago.  There have been no recent changes in his medications.  His appetite is "not great" and his bathroom habits are "no problem".  He becomes out of breath climbing up a FOS (not going down) and this resolves with rest (NYHA II).  He reports home BP readings run "high 130s to the low 140s and on the bottom usually 70s" as of late.  He is accompanied by his fiance.

## 2019-02-14 NOTE — PHYSICAL EXAM
[General Appearance - Well Developed] : well developed [Normal Appearance] : normal appearance [Well Groomed] : well groomed [General Appearance - Well Nourished] : well nourished [General Appearance - In No Acute Distress] : no acute distress [Eyelids - No Xanthelasma] : the eyelids demonstrated no xanthelasmas [No Oral Pallor] : no oral pallor [Bradycardia] : bradycardic [Irregularly Irregular] : irregularly irregular [II] : a grade 2 [No Pitting Edema] : no pitting edema present [Respiration, Rhythm And Depth] : normal respiratory rhythm and effort [Exaggerated Use Of Accessory Muscles For Inspiration] : no accessory muscle use [Bowel Sounds] : normal bowel sounds [Abdomen Soft] : soft [Abnormal Walk] : normal gait [Nail Clubbing] : no clubbing of the fingernails [Cyanosis, Localized] : no localized cyanosis [Skin Color & Pigmentation] : normal skin color and pigmentation [] : no rash [Oriented To Time, Place, And Person] : oriented to person, place, and time [Affect] : the affect was normal [Mood] : the mood was normal [No Anxiety] : not feeling anxious [FreeTextEntry1] : scattered coarse breath sounds, no rales or crackles

## 2019-02-14 NOTE — DISCUSSION/SUMMARY
[Coronary Artery Disease] : coronary artery disease [Chronic Systolic Heart Failure] : chronic systolic congestive heart failure [Stable] : stable [Mitral Regurgitation] : mitral regurgitation [Compensated] : compensated [Medication Changes Per Orders] : as documented in orders [Patient] : the patient [Family] : the patient's family [FreeTextEntry1] : Wale presents for evaluation of mitral regurgitation and discussion of treatment options--specifically consideration for the APOLLO Trial for TMVR.  He notes 6 to 12 months of declining functional capacity and progressive exertional dyspnea.  A TTE was repeated today (as would be needed for the APOLLO Trial) and demonstrated moderate mitral regurgitation, falling short of what would be required for the study.  Additionally, his estimated PASP was 96mmHg (with an SBP of 141mmHg), with RV dysfunction, which is an exclusion for the trial (severe PHTN with a PASP greater than 2/3 systemic pressure and RV dysfunction).  I had an extensive discussion with him and his fiance, reviewing the TTE findings, and why the study would not be feasible at this time.\par \par Regarding his exertional dyspnea, I suspect the etiology is multifactorial (cardiac factors include MR, severe diastolic dysfunction, and an ischemic cardiomyopathy with chronic systolic dysfunction).  In context of his systolic dysfunction, I would consider either Spironolactone or ultimately transitioning to Entresto.  I am most concerned by his severe pulmonary hypertension, and recommended that he seek referral from either his Cardiologist or PCP to a Pulmonologist specializing in pulmonary hypertension to guide further evaluation.  I suspect he will require additional evaluation that may include (if not already done) PFTs, a VQ scan or other dedicated pulmonary imaging (though he is maintained on warfarin), a sleep study, and a repeat right heart catheterization.  His BP is above goal and I am uptitrating his Losartan--I recommended he see his Cardiologist (Dr Griffiths) in 1-2 weeks for follow up and a metabolic panel.  I greatly appreciate the opportunity to evaluate Mr Valdovinos--please do not hesitate to contact me with any follow up questions regarding my assessment and recommendations.

## 2019-02-14 NOTE — REVIEW OF SYSTEMS
[Feeling Fatigued] : feeling fatigued [Eyeglasses] : currently wearing eyeglasses [Dyspnea on exertion] : dyspnea during exertion [Cough] : cough [see HPI] : see HPI [Change in Appetite] : change in appetite [Negative] : Heme/Lymph [Fever] : no fever [Chills] : no chills [Blurry Vision] : no blurred vision [Chest Pain] : no chest pain [Lower Ext Edema] : no extremity edema [Palpitations] : no palpitations

## 2019-02-25 ENCOUNTER — OUTPATIENT (OUTPATIENT)
Dept: OUTPATIENT SERVICES | Facility: HOSPITAL | Age: 79
LOS: 1 days | Discharge: HOME | End: 2019-02-25

## 2019-02-25 DIAGNOSIS — Z98.890 OTHER SPECIFIED POSTPROCEDURAL STATES: Chronic | ICD-10-CM

## 2019-02-25 DIAGNOSIS — Z01.810 ENCOUNTER FOR PREPROCEDURAL CARDIOVASCULAR EXAMINATION: ICD-10-CM

## 2019-02-25 DIAGNOSIS — Z95.1 PRESENCE OF AORTOCORONARY BYPASS GRAFT: Chronic | ICD-10-CM

## 2019-02-25 DIAGNOSIS — I25.10 ATHEROSCLEROTIC HEART DISEASE OF NATIVE CORONARY ARTERY WITHOUT ANGINA PECTORIS: ICD-10-CM

## 2019-03-05 ENCOUNTER — OUTPATIENT (OUTPATIENT)
Dept: OUTPATIENT SERVICES | Facility: HOSPITAL | Age: 79
LOS: 1 days | Discharge: HOME | End: 2019-03-05

## 2019-03-05 DIAGNOSIS — Z79.01 LONG TERM (CURRENT) USE OF ANTICOAGULANTS: ICD-10-CM

## 2019-03-05 DIAGNOSIS — Z98.890 OTHER SPECIFIED POSTPROCEDURAL STATES: Chronic | ICD-10-CM

## 2019-03-05 DIAGNOSIS — Z95.1 PRESENCE OF AORTOCORONARY BYPASS GRAFT: Chronic | ICD-10-CM

## 2019-03-05 DIAGNOSIS — I48.91 UNSPECIFIED ATRIAL FIBRILLATION: ICD-10-CM

## 2019-03-05 LAB
POCT INR: 2.8 RATIO — HIGH (ref 0.9–1.2)
POCT PT: 33.6 SEC — HIGH (ref 10–13.4)

## 2019-04-02 ENCOUNTER — OUTPATIENT (OUTPATIENT)
Dept: OUTPATIENT SERVICES | Facility: HOSPITAL | Age: 79
LOS: 1 days | Discharge: HOME | End: 2019-04-02

## 2019-04-02 DIAGNOSIS — Z98.890 OTHER SPECIFIED POSTPROCEDURAL STATES: Chronic | ICD-10-CM

## 2019-04-02 DIAGNOSIS — I48.91 UNSPECIFIED ATRIAL FIBRILLATION: ICD-10-CM

## 2019-04-02 DIAGNOSIS — Z95.1 PRESENCE OF AORTOCORONARY BYPASS GRAFT: Chronic | ICD-10-CM

## 2019-04-02 DIAGNOSIS — Z79.01 LONG TERM (CURRENT) USE OF ANTICOAGULANTS: ICD-10-CM

## 2019-04-02 LAB
POCT INR: 2.7 RATIO — HIGH (ref 0.9–1.2)
POCT PT: 32.3 SEC — HIGH (ref 10–13.4)

## 2019-04-19 ENCOUNTER — OUTPATIENT (OUTPATIENT)
Dept: OUTPATIENT SERVICES | Facility: HOSPITAL | Age: 79
LOS: 1 days | Discharge: HOME | End: 2019-04-19

## 2019-04-19 DIAGNOSIS — Z98.890 OTHER SPECIFIED POSTPROCEDURAL STATES: Chronic | ICD-10-CM

## 2019-04-19 DIAGNOSIS — Z79.01 LONG TERM (CURRENT) USE OF ANTICOAGULANTS: ICD-10-CM

## 2019-04-19 DIAGNOSIS — Z95.1 PRESENCE OF AORTOCORONARY BYPASS GRAFT: Chronic | ICD-10-CM

## 2019-04-19 DIAGNOSIS — I48.91 UNSPECIFIED ATRIAL FIBRILLATION: ICD-10-CM

## 2019-04-19 LAB
POCT INR: 2.1 RATIO — HIGH (ref 0.9–1.2)
POCT PT: 24.9 SEC — HIGH (ref 10–13.4)

## 2019-05-07 ENCOUNTER — OUTPATIENT (OUTPATIENT)
Dept: OUTPATIENT SERVICES | Facility: HOSPITAL | Age: 79
LOS: 1 days | Discharge: HOME | End: 2019-05-07

## 2019-05-07 DIAGNOSIS — I48.91 UNSPECIFIED ATRIAL FIBRILLATION: ICD-10-CM

## 2019-05-07 DIAGNOSIS — Z95.1 PRESENCE OF AORTOCORONARY BYPASS GRAFT: Chronic | ICD-10-CM

## 2019-05-07 DIAGNOSIS — Z79.01 LONG TERM (CURRENT) USE OF ANTICOAGULANTS: ICD-10-CM

## 2019-05-07 DIAGNOSIS — Z98.890 OTHER SPECIFIED POSTPROCEDURAL STATES: Chronic | ICD-10-CM

## 2019-05-07 LAB
POCT INR: 2.3 RATIO — HIGH (ref 0.9–1.2)
POCT PT: 28.1 SEC — HIGH (ref 10–13.4)

## 2019-05-09 ENCOUNTER — OUTPATIENT (OUTPATIENT)
Dept: OUTPATIENT SERVICES | Facility: HOSPITAL | Age: 79
LOS: 1 days | Discharge: HOME | End: 2019-05-09
Payer: MEDICARE

## 2019-05-09 DIAGNOSIS — Z95.1 PRESENCE OF AORTOCORONARY BYPASS GRAFT: Chronic | ICD-10-CM

## 2019-05-09 DIAGNOSIS — R06.89 OTHER ABNORMALITIES OF BREATHING: ICD-10-CM

## 2019-05-09 DIAGNOSIS — Z98.890 OTHER SPECIFIED POSTPROCEDURAL STATES: Chronic | ICD-10-CM

## 2019-05-09 PROCEDURE — 78582 LUNG VENTILAT&PERFUS IMAGING: CPT | Mod: 26

## 2019-06-04 ENCOUNTER — OUTPATIENT (OUTPATIENT)
Dept: OUTPATIENT SERVICES | Facility: HOSPITAL | Age: 79
LOS: 1 days | Discharge: HOME | End: 2019-06-04

## 2019-06-04 DIAGNOSIS — Z95.1 PRESENCE OF AORTOCORONARY BYPASS GRAFT: Chronic | ICD-10-CM

## 2019-06-04 DIAGNOSIS — Z98.890 OTHER SPECIFIED POSTPROCEDURAL STATES: Chronic | ICD-10-CM

## 2019-06-04 DIAGNOSIS — Z79.01 LONG TERM (CURRENT) USE OF ANTICOAGULANTS: ICD-10-CM

## 2019-06-04 DIAGNOSIS — I48.91 UNSPECIFIED ATRIAL FIBRILLATION: ICD-10-CM

## 2019-06-04 LAB
POCT INR: 2.1 RATIO — HIGH (ref 0.9–1.2)
POCT PT: 25 SEC — HIGH (ref 10–13.4)

## 2019-06-18 ENCOUNTER — LABORATORY RESULT (OUTPATIENT)
Age: 79
End: 2019-06-18

## 2019-06-18 ENCOUNTER — OUTPATIENT (OUTPATIENT)
Dept: OUTPATIENT SERVICES | Facility: HOSPITAL | Age: 79
LOS: 1 days | Discharge: HOME | End: 2019-06-18

## 2019-06-18 DIAGNOSIS — Z98.890 OTHER SPECIFIED POSTPROCEDURAL STATES: Chronic | ICD-10-CM

## 2019-06-18 DIAGNOSIS — I25.10 ATHEROSCLEROTIC HEART DISEASE OF NATIVE CORONARY ARTERY WITHOUT ANGINA PECTORIS: ICD-10-CM

## 2019-06-18 DIAGNOSIS — Z95.1 PRESENCE OF AORTOCORONARY BYPASS GRAFT: Chronic | ICD-10-CM

## 2019-06-18 DIAGNOSIS — Z79.899 OTHER LONG TERM (CURRENT) DRUG THERAPY: ICD-10-CM

## 2019-06-18 DIAGNOSIS — E78.00 PURE HYPERCHOLESTEROLEMIA, UNSPECIFIED: ICD-10-CM

## 2019-07-02 ENCOUNTER — OUTPATIENT (OUTPATIENT)
Dept: OUTPATIENT SERVICES | Facility: HOSPITAL | Age: 79
LOS: 1 days | Discharge: HOME | End: 2019-07-02

## 2019-07-02 DIAGNOSIS — I48.91 UNSPECIFIED ATRIAL FIBRILLATION: ICD-10-CM

## 2019-07-02 DIAGNOSIS — Z79.01 LONG TERM (CURRENT) USE OF ANTICOAGULANTS: ICD-10-CM

## 2019-07-02 DIAGNOSIS — Z98.890 OTHER SPECIFIED POSTPROCEDURAL STATES: Chronic | ICD-10-CM

## 2019-07-02 DIAGNOSIS — Z95.1 PRESENCE OF AORTOCORONARY BYPASS GRAFT: Chronic | ICD-10-CM

## 2019-07-02 LAB
POCT INR: 1.9 RATIO — HIGH (ref 0.9–1.2)
POCT PT: 22.7 SEC — HIGH (ref 10–13.4)

## 2019-07-17 ENCOUNTER — OUTPATIENT (OUTPATIENT)
Dept: OUTPATIENT SERVICES | Facility: HOSPITAL | Age: 79
LOS: 1 days | Discharge: HOME | End: 2019-07-17

## 2019-07-17 ENCOUNTER — APPOINTMENT (OUTPATIENT)
Dept: CARDIOLOGY | Facility: CLINIC | Age: 79
End: 2019-07-17
Payer: MEDICARE

## 2019-07-17 DIAGNOSIS — Z95.1 PRESENCE OF AORTOCORONARY BYPASS GRAFT: Chronic | ICD-10-CM

## 2019-07-17 DIAGNOSIS — Z98.890 OTHER SPECIFIED POSTPROCEDURAL STATES: Chronic | ICD-10-CM

## 2019-07-17 DIAGNOSIS — Z79.01 LONG TERM (CURRENT) USE OF ANTICOAGULANTS: ICD-10-CM

## 2019-07-17 DIAGNOSIS — I48.91 UNSPECIFIED ATRIAL FIBRILLATION: ICD-10-CM

## 2019-07-17 LAB
POCT INR: 2.2 RATIO — HIGH (ref 0.9–1.2)
POCT PT: 25.8 SEC — HIGH (ref 10–13.4)

## 2019-07-17 PROCEDURE — 93290 INTERROG DEV EVAL ICPMS IP: CPT | Mod: 59

## 2019-07-17 PROCEDURE — 93282 PRGRMG EVAL IMPLANTABLE DFB: CPT | Mod: 59

## 2019-07-17 PROCEDURE — 99213 OFFICE O/P EST LOW 20 MIN: CPT | Mod: 25

## 2019-08-14 ENCOUNTER — OUTPATIENT (OUTPATIENT)
Dept: OUTPATIENT SERVICES | Facility: HOSPITAL | Age: 79
LOS: 1 days | Discharge: HOME | End: 2019-08-14

## 2019-08-14 DIAGNOSIS — I48.91 UNSPECIFIED ATRIAL FIBRILLATION: ICD-10-CM

## 2019-08-14 DIAGNOSIS — Z98.890 OTHER SPECIFIED POSTPROCEDURAL STATES: Chronic | ICD-10-CM

## 2019-08-14 DIAGNOSIS — Z79.01 LONG TERM (CURRENT) USE OF ANTICOAGULANTS: ICD-10-CM

## 2019-08-14 DIAGNOSIS — Z95.1 PRESENCE OF AORTOCORONARY BYPASS GRAFT: Chronic | ICD-10-CM

## 2019-08-14 LAB
POCT INR: 2.1 RATIO — HIGH (ref 0.9–1.2)
POCT PT: 24.8 SEC — HIGH (ref 10–13.4)

## 2019-08-22 NOTE — PRE-ANESTHESIA EVALUATION ADULT - NSANTHTIREDRD_ENT_A_CORE
Pt informed and verbalized understanding----- Message from Reinaldo Fontenot MD sent at 8/22/2019  2:09 PM CDT -----  Call the patient and lab results are acceptable.     No

## 2019-09-11 ENCOUNTER — OUTPATIENT (OUTPATIENT)
Dept: OUTPATIENT SERVICES | Facility: HOSPITAL | Age: 79
LOS: 1 days | Discharge: HOME | End: 2019-09-11

## 2019-09-11 DIAGNOSIS — Z79.01 LONG TERM (CURRENT) USE OF ANTICOAGULANTS: ICD-10-CM

## 2019-09-11 DIAGNOSIS — I48.91 UNSPECIFIED ATRIAL FIBRILLATION: ICD-10-CM

## 2019-09-11 DIAGNOSIS — Z98.890 OTHER SPECIFIED POSTPROCEDURAL STATES: Chronic | ICD-10-CM

## 2019-09-11 DIAGNOSIS — Z95.1 PRESENCE OF AORTOCORONARY BYPASS GRAFT: Chronic | ICD-10-CM

## 2019-09-11 LAB
POCT INR: 2.2 RATIO — HIGH (ref 0.9–1.2)
POCT PT: 26.2 SEC — HIGH (ref 10–13.4)

## 2019-09-19 ENCOUNTER — OUTPATIENT (OUTPATIENT)
Dept: OUTPATIENT SERVICES | Facility: HOSPITAL | Age: 79
LOS: 1 days | Discharge: HOME | End: 2019-09-19

## 2019-09-19 DIAGNOSIS — R79.89 OTHER SPECIFIED ABNORMAL FINDINGS OF BLOOD CHEMISTRY: ICD-10-CM

## 2019-09-19 DIAGNOSIS — R80.9 PROTEINURIA, UNSPECIFIED: ICD-10-CM

## 2019-09-19 DIAGNOSIS — R68.89 OTHER GENERAL SYMPTOMS AND SIGNS: ICD-10-CM

## 2019-09-19 DIAGNOSIS — R74.8 ABNORMAL LEVELS OF OTHER SERUM ENZYMES: ICD-10-CM

## 2019-09-19 DIAGNOSIS — Z98.890 OTHER SPECIFIED POSTPROCEDURAL STATES: Chronic | ICD-10-CM

## 2019-09-19 DIAGNOSIS — Z95.1 PRESENCE OF AORTOCORONARY BYPASS GRAFT: Chronic | ICD-10-CM

## 2019-09-19 DIAGNOSIS — E78.5 HYPERLIPIDEMIA, UNSPECIFIED: ICD-10-CM

## 2019-09-19 DIAGNOSIS — R73.09 OTHER ABNORMAL GLUCOSE: ICD-10-CM

## 2019-10-17 ENCOUNTER — LABORATORY RESULT (OUTPATIENT)
Age: 79
End: 2019-10-17

## 2019-10-17 ENCOUNTER — OUTPATIENT (OUTPATIENT)
Dept: OUTPATIENT SERVICES | Facility: HOSPITAL | Age: 79
LOS: 1 days | Discharge: HOME | End: 2019-10-17

## 2019-10-17 DIAGNOSIS — Z98.890 OTHER SPECIFIED POSTPROCEDURAL STATES: Chronic | ICD-10-CM

## 2019-10-17 DIAGNOSIS — E11.9 TYPE 2 DIABETES MELLITUS WITHOUT COMPLICATIONS: ICD-10-CM

## 2019-10-17 DIAGNOSIS — I25.10 ATHEROSCLEROTIC HEART DISEASE OF NATIVE CORONARY ARTERY WITHOUT ANGINA PECTORIS: ICD-10-CM

## 2019-10-17 DIAGNOSIS — I48.91 UNSPECIFIED ATRIAL FIBRILLATION: ICD-10-CM

## 2019-10-17 DIAGNOSIS — Z95.1 PRESENCE OF AORTOCORONARY BYPASS GRAFT: Chronic | ICD-10-CM

## 2019-10-17 DIAGNOSIS — Z79.01 LONG TERM (CURRENT) USE OF ANTICOAGULANTS: ICD-10-CM

## 2019-10-17 LAB
POCT INR: 2.6 RATIO — HIGH (ref 0.9–1.2)
POCT PT: 31.8 SEC — HIGH (ref 10–13.4)

## 2019-10-21 DIAGNOSIS — Z02.9 ENCOUNTER FOR ADMINISTRATIVE EXAMINATIONS, UNSPECIFIED: ICD-10-CM

## 2019-10-21 DIAGNOSIS — E11.9 TYPE 2 DIABETES MELLITUS WITHOUT COMPLICATIONS: ICD-10-CM

## 2019-10-21 DIAGNOSIS — I25.10 ATHEROSCLEROTIC HEART DISEASE OF NATIVE CORONARY ARTERY WITHOUT ANGINA PECTORIS: ICD-10-CM

## 2019-11-11 ENCOUNTER — APPOINTMENT (OUTPATIENT)
Dept: CARDIOLOGY | Facility: CLINIC | Age: 79
End: 2019-11-11
Payer: MEDICARE

## 2019-11-11 PROCEDURE — 99213 OFFICE O/P EST LOW 20 MIN: CPT | Mod: 25

## 2019-11-11 PROCEDURE — 93282 PRGRMG EVAL IMPLANTABLE DFB: CPT | Mod: 59

## 2019-11-11 PROCEDURE — 93290 INTERROG DEV EVAL ICPMS IP: CPT | Mod: 59

## 2019-11-18 ENCOUNTER — APPOINTMENT (OUTPATIENT)
Dept: CARDIOLOGY | Facility: CLINIC | Age: 79
End: 2019-11-18

## 2019-11-19 ENCOUNTER — OUTPATIENT (OUTPATIENT)
Dept: OUTPATIENT SERVICES | Facility: HOSPITAL | Age: 79
LOS: 1 days | Discharge: HOME | End: 2019-11-19

## 2019-11-19 DIAGNOSIS — Z98.890 OTHER SPECIFIED POSTPROCEDURAL STATES: Chronic | ICD-10-CM

## 2019-11-19 DIAGNOSIS — Z95.1 PRESENCE OF AORTOCORONARY BYPASS GRAFT: Chronic | ICD-10-CM

## 2019-11-19 DIAGNOSIS — I48.91 UNSPECIFIED ATRIAL FIBRILLATION: ICD-10-CM

## 2019-11-19 DIAGNOSIS — Z79.01 LONG TERM (CURRENT) USE OF ANTICOAGULANTS: ICD-10-CM

## 2019-11-19 LAB
POCT INR: 2.3 RATIO — HIGH (ref 0.9–1.2)
POCT PT: 27 SEC — HIGH (ref 10–13.4)

## 2019-12-17 ENCOUNTER — OUTPATIENT (OUTPATIENT)
Dept: OUTPATIENT SERVICES | Facility: HOSPITAL | Age: 79
LOS: 1 days | Discharge: HOME | End: 2019-12-17

## 2019-12-17 DIAGNOSIS — Z79.01 LONG TERM (CURRENT) USE OF ANTICOAGULANTS: ICD-10-CM

## 2019-12-17 DIAGNOSIS — Z98.890 OTHER SPECIFIED POSTPROCEDURAL STATES: Chronic | ICD-10-CM

## 2019-12-17 DIAGNOSIS — Z95.1 PRESENCE OF AORTOCORONARY BYPASS GRAFT: Chronic | ICD-10-CM

## 2019-12-17 DIAGNOSIS — I48.91 UNSPECIFIED ATRIAL FIBRILLATION: ICD-10-CM

## 2019-12-17 LAB
INR PPP: 2.6 RATIO
POCT INR: 2.6 RATIO — HIGH (ref 0.9–1.2)
POCT PT: 31 SEC — HIGH (ref 10–13.4)
POCT-PROTHROMBIN TIME: 31 SECS

## 2020-01-02 ENCOUNTER — LABORATORY RESULT (OUTPATIENT)
Age: 80
End: 2020-01-02

## 2020-01-27 ENCOUNTER — APPOINTMENT (OUTPATIENT)
Dept: CARDIOLOGY | Facility: CLINIC | Age: 80
End: 2020-01-27
Payer: MEDICARE

## 2020-01-27 PROCEDURE — 93290 INTERROG DEV EVAL ICPMS IP: CPT | Mod: 59

## 2020-01-27 PROCEDURE — 99213 OFFICE O/P EST LOW 20 MIN: CPT | Mod: 25

## 2020-01-27 PROCEDURE — 93282 PRGRMG EVAL IMPLANTABLE DFB: CPT | Mod: 59

## 2020-01-28 ENCOUNTER — OUTPATIENT (OUTPATIENT)
Dept: OUTPATIENT SERVICES | Facility: HOSPITAL | Age: 80
LOS: 1 days | Discharge: HOME | End: 2020-01-28

## 2020-01-28 DIAGNOSIS — Z79.01 LONG TERM (CURRENT) USE OF ANTICOAGULANTS: ICD-10-CM

## 2020-01-28 DIAGNOSIS — Z95.1 PRESENCE OF AORTOCORONARY BYPASS GRAFT: Chronic | ICD-10-CM

## 2020-01-28 DIAGNOSIS — I48.91 UNSPECIFIED ATRIAL FIBRILLATION: ICD-10-CM

## 2020-01-28 DIAGNOSIS — Z98.890 OTHER SPECIFIED POSTPROCEDURAL STATES: Chronic | ICD-10-CM

## 2020-01-28 LAB
POCT INR: 2.2 RATIO — HIGH (ref 0.9–1.2)
POCT PT: 26.8 SEC — HIGH (ref 10–13.4)

## 2020-01-30 LAB
INR PPP: 2.2 RATIO
POCT-PROTHROMBIN TIME: 26.8 SECS

## 2020-03-03 ENCOUNTER — OUTPATIENT (OUTPATIENT)
Dept: OUTPATIENT SERVICES | Facility: HOSPITAL | Age: 80
LOS: 1 days | Discharge: HOME | End: 2020-03-03

## 2020-03-03 DIAGNOSIS — Z98.890 OTHER SPECIFIED POSTPROCEDURAL STATES: Chronic | ICD-10-CM

## 2020-03-03 DIAGNOSIS — Z95.1 PRESENCE OF AORTOCORONARY BYPASS GRAFT: Chronic | ICD-10-CM

## 2020-03-03 DIAGNOSIS — I48.91 UNSPECIFIED ATRIAL FIBRILLATION: ICD-10-CM

## 2020-03-03 DIAGNOSIS — Z79.01 LONG TERM (CURRENT) USE OF ANTICOAGULANTS: ICD-10-CM

## 2020-03-03 LAB
INR PPP: 2.3 RATIO
POCT INR: 2.3 RATIO — HIGH (ref 0.9–1.2)
POCT PT: 28.1 SEC — HIGH (ref 10–13.4)
POCT-PROTHROMBIN TIME: 28.1 SECS
QUALITY CONTROL: YES

## 2020-04-14 ENCOUNTER — OUTPATIENT (OUTPATIENT)
Dept: OUTPATIENT SERVICES | Facility: HOSPITAL | Age: 80
LOS: 1 days | Discharge: HOME | End: 2020-04-14

## 2020-04-14 DIAGNOSIS — I48.91 UNSPECIFIED ATRIAL FIBRILLATION: ICD-10-CM

## 2020-04-14 DIAGNOSIS — Z95.1 PRESENCE OF AORTOCORONARY BYPASS GRAFT: Chronic | ICD-10-CM

## 2020-04-14 DIAGNOSIS — Z98.890 OTHER SPECIFIED POSTPROCEDURAL STATES: Chronic | ICD-10-CM

## 2020-04-14 DIAGNOSIS — Z79.01 LONG TERM (CURRENT) USE OF ANTICOAGULANTS: ICD-10-CM

## 2020-04-14 LAB
INR PPP: 2.9 RATIO
POCT INR: 2.9 RATIO — HIGH (ref 0.9–1.2)
POCT PT: 35 SEC — HIGH (ref 10–13.4)
POCT-PROTHROMBIN TIME: 35 SECS
QUALITY CONTROL: YES

## 2020-05-12 ENCOUNTER — OUTPATIENT (OUTPATIENT)
Dept: OUTPATIENT SERVICES | Facility: HOSPITAL | Age: 80
LOS: 1 days | Discharge: HOME | End: 2020-05-12

## 2020-05-12 DIAGNOSIS — Z95.1 PRESENCE OF AORTOCORONARY BYPASS GRAFT: Chronic | ICD-10-CM

## 2020-05-12 DIAGNOSIS — Z98.890 OTHER SPECIFIED POSTPROCEDURAL STATES: Chronic | ICD-10-CM

## 2020-05-12 DIAGNOSIS — I48.91 UNSPECIFIED ATRIAL FIBRILLATION: ICD-10-CM

## 2020-05-12 DIAGNOSIS — Z79.01 LONG TERM (CURRENT) USE OF ANTICOAGULANTS: ICD-10-CM

## 2020-05-12 LAB
POCT INR: 2 RATIO — HIGH (ref 0.9–1.2)
POCT PT: 24.1 SEC — HIGH (ref 10–13.4)

## 2020-05-19 ENCOUNTER — OUTPATIENT (OUTPATIENT)
Dept: OUTPATIENT SERVICES | Facility: HOSPITAL | Age: 80
LOS: 1 days | Discharge: HOME | End: 2020-05-19

## 2020-05-19 DIAGNOSIS — Z79.01 LONG TERM (CURRENT) USE OF ANTICOAGULANTS: ICD-10-CM

## 2020-05-19 DIAGNOSIS — Z95.1 PRESENCE OF AORTOCORONARY BYPASS GRAFT: Chronic | ICD-10-CM

## 2020-05-19 DIAGNOSIS — Z98.890 OTHER SPECIFIED POSTPROCEDURAL STATES: Chronic | ICD-10-CM

## 2020-05-19 DIAGNOSIS — I48.91 UNSPECIFIED ATRIAL FIBRILLATION: ICD-10-CM

## 2020-05-19 LAB
INR PPP: 2 RATIO
POCT INR: 1.9 RATIO — HIGH (ref 0.9–1.2)
POCT PT: 22.8 SEC — HIGH (ref 10–13.4)
POCT-PROTHROMBIN TIME: 24.1 SECS
QUALITY CONTROL: YES

## 2020-05-20 LAB
INR PPP: 1.9 RATIO
POCT-PROTHROMBIN TIME: 22.8 SECS
QUALITY CONTROL: YES

## 2020-06-02 ENCOUNTER — OUTPATIENT (OUTPATIENT)
Dept: OUTPATIENT SERVICES | Facility: HOSPITAL | Age: 80
LOS: 1 days | Discharge: HOME | End: 2020-06-02

## 2020-06-02 DIAGNOSIS — Z98.890 OTHER SPECIFIED POSTPROCEDURAL STATES: Chronic | ICD-10-CM

## 2020-06-02 DIAGNOSIS — Z79.01 LONG TERM (CURRENT) USE OF ANTICOAGULANTS: ICD-10-CM

## 2020-06-02 DIAGNOSIS — I48.91 UNSPECIFIED ATRIAL FIBRILLATION: ICD-10-CM

## 2020-06-02 DIAGNOSIS — Z95.1 PRESENCE OF AORTOCORONARY BYPASS GRAFT: Chronic | ICD-10-CM

## 2020-06-02 LAB
POCT INR: 2.1 RATIO — HIGH (ref 0.9–1.2)
POCT PT: 25.5 SEC — HIGH (ref 10–13.4)

## 2020-06-16 ENCOUNTER — OUTPATIENT (OUTPATIENT)
Dept: OUTPATIENT SERVICES | Facility: HOSPITAL | Age: 80
LOS: 1 days | Discharge: HOME | End: 2020-06-16

## 2020-06-16 DIAGNOSIS — Z79.01 LONG TERM (CURRENT) USE OF ANTICOAGULANTS: ICD-10-CM

## 2020-06-16 DIAGNOSIS — Z95.1 PRESENCE OF AORTOCORONARY BYPASS GRAFT: Chronic | ICD-10-CM

## 2020-06-16 DIAGNOSIS — I48.91 UNSPECIFIED ATRIAL FIBRILLATION: ICD-10-CM

## 2020-06-16 DIAGNOSIS — Z98.890 OTHER SPECIFIED POSTPROCEDURAL STATES: Chronic | ICD-10-CM

## 2020-06-16 LAB
POCT INR: 2.2 RATIO — HIGH (ref 0.9–1.2)
POCT PT: 26.8 SEC — HIGH (ref 10–13.4)

## 2020-06-22 LAB
INR PPP: 2.2 RATIO
POCT-PROTHROMBIN TIME: 26.8 SECS
QUALITY CONTROL: YES

## 2020-07-06 ENCOUNTER — OUTPATIENT (OUTPATIENT)
Dept: OUTPATIENT SERVICES | Facility: HOSPITAL | Age: 80
LOS: 1 days | Discharge: HOME | End: 2020-07-06

## 2020-07-06 ENCOUNTER — APPOINTMENT (OUTPATIENT)
Dept: CARDIOLOGY | Facility: CLINIC | Age: 80
End: 2020-07-06
Payer: MEDICARE

## 2020-07-06 DIAGNOSIS — Z98.890 OTHER SPECIFIED POSTPROCEDURAL STATES: Chronic | ICD-10-CM

## 2020-07-06 DIAGNOSIS — Z95.1 PRESENCE OF AORTOCORONARY BYPASS GRAFT: Chronic | ICD-10-CM

## 2020-07-06 DIAGNOSIS — R79.1 ABNORMAL COAGULATION PROFILE: ICD-10-CM

## 2020-07-06 DIAGNOSIS — Z79.01 LONG TERM (CURRENT) USE OF ANTICOAGULANTS: ICD-10-CM

## 2020-07-06 LAB
POCT INR: 2.7 RATIO — HIGH (ref 0.9–1.2)
POCT PT: 32.6 SEC — HIGH (ref 10–13.4)

## 2020-07-06 PROCEDURE — 93000 ELECTROCARDIOGRAM COMPLETE: CPT

## 2020-07-06 PROCEDURE — 99214 OFFICE O/P EST MOD 30 MIN: CPT

## 2020-07-08 LAB
INR PPP: 2.7 RATIO
POCT-PROTHROMBIN TIME: 32.6 SECS
QUALITY CONTROL: YES

## 2020-07-17 ENCOUNTER — OUTPATIENT (OUTPATIENT)
Dept: OUTPATIENT SERVICES | Facility: HOSPITAL | Age: 80
LOS: 1 days | Discharge: HOME | End: 2020-07-17

## 2020-07-17 DIAGNOSIS — Z79.01 LONG TERM (CURRENT) USE OF ANTICOAGULANTS: ICD-10-CM

## 2020-07-17 DIAGNOSIS — I48.91 UNSPECIFIED ATRIAL FIBRILLATION: ICD-10-CM

## 2020-07-17 DIAGNOSIS — Z95.1 PRESENCE OF AORTOCORONARY BYPASS GRAFT: Chronic | ICD-10-CM

## 2020-07-17 DIAGNOSIS — Z98.890 OTHER SPECIFIED POSTPROCEDURAL STATES: Chronic | ICD-10-CM

## 2020-07-17 LAB
POCT INR: 2 RATIO — HIGH (ref 0.9–1.2)
POCT PT: 23.8 SEC — HIGH (ref 10–13.4)

## 2020-07-21 LAB
INR PPP: 2 RATIO
POCT-PROTHROMBIN TIME: 23.8 SECS
QUALITY CONTROL: YES

## 2020-07-27 ENCOUNTER — OUTPATIENT (OUTPATIENT)
Dept: OUTPATIENT SERVICES | Facility: HOSPITAL | Age: 80
LOS: 1 days | Discharge: HOME | End: 2020-07-27

## 2020-07-27 DIAGNOSIS — I48.91 UNSPECIFIED ATRIAL FIBRILLATION: ICD-10-CM

## 2020-07-27 DIAGNOSIS — Z79.01 LONG TERM (CURRENT) USE OF ANTICOAGULANTS: ICD-10-CM

## 2020-07-27 DIAGNOSIS — Z95.1 PRESENCE OF AORTOCORONARY BYPASS GRAFT: Chronic | ICD-10-CM

## 2020-07-27 DIAGNOSIS — Z98.890 OTHER SPECIFIED POSTPROCEDURAL STATES: Chronic | ICD-10-CM

## 2020-07-27 LAB
INR PPP: 1.6 RATIO
POCT INR: 1.6 RATIO — HIGH (ref 0.9–1.2)
POCT PT: 19.6 SEC — HIGH (ref 10–13.4)
POCT-PROTHROMBIN TIME: 19.6 SECS
QUALITY CONTROL: YES

## 2020-08-03 ENCOUNTER — OUTPATIENT (OUTPATIENT)
Dept: OUTPATIENT SERVICES | Facility: HOSPITAL | Age: 80
LOS: 1 days | Discharge: HOME | End: 2020-08-03

## 2020-08-03 ENCOUNTER — RECORD ABSTRACTING (OUTPATIENT)
Age: 80
End: 2020-08-03

## 2020-08-03 DIAGNOSIS — Z86.79 PERSONAL HISTORY OF OTHER DISEASES OF THE CIRCULATORY SYSTEM: ICD-10-CM

## 2020-08-03 DIAGNOSIS — Z98.890 OTHER SPECIFIED POSTPROCEDURAL STATES: Chronic | ICD-10-CM

## 2020-08-03 DIAGNOSIS — Z79.01 LONG TERM (CURRENT) USE OF ANTICOAGULANTS: ICD-10-CM

## 2020-08-03 DIAGNOSIS — Z78.9 OTHER SPECIFIED HEALTH STATUS: ICD-10-CM

## 2020-08-03 DIAGNOSIS — Z95.1 PRESENCE OF AORTOCORONARY BYPASS GRAFT: Chronic | ICD-10-CM

## 2020-08-03 DIAGNOSIS — Z86.39 PERSONAL HISTORY OF OTHER ENDOCRINE, NUTRITIONAL AND METABOLIC DISEASE: ICD-10-CM

## 2020-08-03 DIAGNOSIS — I48.91 UNSPECIFIED ATRIAL FIBRILLATION: ICD-10-CM

## 2020-08-03 LAB
POCT INR: 2.5 RATIO — HIGH (ref 0.9–1.2)
POCT PT: 30.6 SEC — HIGH (ref 10–13.4)

## 2020-08-03 RX ORDER — MULTIVITAMIN
TABLET ORAL
Refills: 0 | Status: ACTIVE | COMMUNITY

## 2020-08-03 RX ORDER — ASCORBIC ACID 500 MG
500 TABLET ORAL DAILY
Refills: 0 | Status: ACTIVE | COMMUNITY

## 2020-08-03 RX ORDER — CHROMIUM 200 MCG
TABLET ORAL
Refills: 0 | Status: ACTIVE | COMMUNITY

## 2020-08-03 RX ORDER — UBIDECARENONE 50 MG
TABLET ORAL
Refills: 0 | Status: ACTIVE | COMMUNITY

## 2020-08-10 LAB
INR PPP: 2.5 RATIO
POCT-PROTHROMBIN TIME: 30.6 SECS
QUALITY CONTROL: YES

## 2020-08-17 ENCOUNTER — OUTPATIENT (OUTPATIENT)
Dept: OUTPATIENT SERVICES | Facility: HOSPITAL | Age: 80
LOS: 1 days | Discharge: HOME | End: 2020-08-17

## 2020-08-17 ENCOUNTER — APPOINTMENT (OUTPATIENT)
Dept: MEDICATION MANAGEMENT | Facility: CLINIC | Age: 80
End: 2020-08-17

## 2020-08-17 VITALS — RESPIRATION RATE: 16 BRPM | HEART RATE: 55 BPM | OXYGEN SATURATION: 95 %

## 2020-08-17 DIAGNOSIS — Z95.1 PRESENCE OF AORTOCORONARY BYPASS GRAFT: Chronic | ICD-10-CM

## 2020-08-17 DIAGNOSIS — I48.91 UNSPECIFIED ATRIAL FIBRILLATION: ICD-10-CM

## 2020-08-17 DIAGNOSIS — Z98.890 OTHER SPECIFIED POSTPROCEDURAL STATES: Chronic | ICD-10-CM

## 2020-08-17 DIAGNOSIS — Z79.01 LONG TERM (CURRENT) USE OF ANTICOAGULANTS: ICD-10-CM

## 2020-08-17 LAB
INR PPP: 2.5 RATIO
POCT-PROTHROMBIN TIME: 29.5 SECS
QUALITY CONTROL: YES

## 2020-09-14 ENCOUNTER — APPOINTMENT (OUTPATIENT)
Dept: MEDICATION MANAGEMENT | Facility: CLINIC | Age: 80
End: 2020-09-14

## 2020-09-14 ENCOUNTER — OUTPATIENT (OUTPATIENT)
Dept: OUTPATIENT SERVICES | Facility: HOSPITAL | Age: 80
LOS: 1 days | Discharge: HOME | End: 2020-09-14

## 2020-09-14 VITALS — HEART RATE: 52 BPM | OXYGEN SATURATION: 95 % | RESPIRATION RATE: 16 BRPM

## 2020-09-14 DIAGNOSIS — Z98.890 OTHER SPECIFIED POSTPROCEDURAL STATES: Chronic | ICD-10-CM

## 2020-09-14 DIAGNOSIS — Z79.01 LONG TERM (CURRENT) USE OF ANTICOAGULANTS: ICD-10-CM

## 2020-09-14 DIAGNOSIS — Z95.1 PRESENCE OF AORTOCORONARY BYPASS GRAFT: Chronic | ICD-10-CM

## 2020-09-14 DIAGNOSIS — I48.91 UNSPECIFIED ATRIAL FIBRILLATION: ICD-10-CM

## 2020-09-14 LAB
INR PPP: 2.5 RATIO
POCT-PROTHROMBIN TIME: 29.5 SECS
QUALITY CONTROL: YES

## 2020-10-07 DIAGNOSIS — E11.9 TYPE 2 DIABETES MELLITUS W/OUT COMPLICATIONS: ICD-10-CM

## 2020-10-19 ENCOUNTER — OUTPATIENT (OUTPATIENT)
Dept: OUTPATIENT SERVICES | Facility: HOSPITAL | Age: 80
LOS: 1 days | Discharge: HOME | End: 2020-10-19

## 2020-10-19 ENCOUNTER — APPOINTMENT (OUTPATIENT)
Dept: MEDICATION MANAGEMENT | Facility: CLINIC | Age: 80
End: 2020-10-19

## 2020-10-19 VITALS — RESPIRATION RATE: 16 BRPM | HEART RATE: 52 BPM

## 2020-10-19 DIAGNOSIS — Z95.1 PRESENCE OF AORTOCORONARY BYPASS GRAFT: Chronic | ICD-10-CM

## 2020-10-19 DIAGNOSIS — Z98.890 OTHER SPECIFIED POSTPROCEDURAL STATES: Chronic | ICD-10-CM

## 2020-10-19 DIAGNOSIS — Z79.01 LONG TERM (CURRENT) USE OF ANTICOAGULANTS: ICD-10-CM

## 2020-10-19 DIAGNOSIS — I48.91 UNSPECIFIED ATRIAL FIBRILLATION: ICD-10-CM

## 2020-10-19 LAB
INR PPP: 2.1 RATIO
POCT-PROTHROMBIN TIME: 25.6 SECS
QUALITY CONTROL: YES

## 2020-11-12 ENCOUNTER — RX RENEWAL (OUTPATIENT)
Age: 80
End: 2020-11-12

## 2020-11-23 ENCOUNTER — OUTPATIENT (OUTPATIENT)
Dept: OUTPATIENT SERVICES | Facility: HOSPITAL | Age: 80
LOS: 1 days | Discharge: HOME | End: 2020-11-23

## 2020-11-23 ENCOUNTER — APPOINTMENT (OUTPATIENT)
Dept: MEDICATION MANAGEMENT | Facility: CLINIC | Age: 80
End: 2020-11-23

## 2020-11-23 VITALS — HEART RATE: 52 BPM | RESPIRATION RATE: 16 BRPM

## 2020-11-23 DIAGNOSIS — Z95.1 PRESENCE OF AORTOCORONARY BYPASS GRAFT: Chronic | ICD-10-CM

## 2020-11-23 DIAGNOSIS — I48.91 UNSPECIFIED ATRIAL FIBRILLATION: ICD-10-CM

## 2020-11-23 DIAGNOSIS — Z79.01 LONG TERM (CURRENT) USE OF ANTICOAGULANTS: ICD-10-CM

## 2020-11-23 DIAGNOSIS — Z98.890 OTHER SPECIFIED POSTPROCEDURAL STATES: Chronic | ICD-10-CM

## 2020-11-23 LAB
INR PPP: 2 RATIO
POCT-PROTHROMBIN TIME: 24.4 SECS
QUALITY CONTROL: YES

## 2020-11-30 ENCOUNTER — APPOINTMENT (OUTPATIENT)
Dept: CARDIOLOGY | Facility: CLINIC | Age: 80
End: 2020-11-30
Payer: MEDICARE

## 2020-11-30 NOTE — ASSESSMENT
[FreeTextEntry1] : S/P CABG LIMA to LAD SVG to CX and RCA\par s/p MELBA severe MR had VTach during requiring cardioversion also ? with propoful and ? batch in hosp at that time \par LHC showed patent LIMA occluded SVG's\par ICD placed Medtronic VVI LRL 40 \par felt not to be goood clip candidate ?TMVR wyatt Alfred \par was taken off diuretic due to gout but feels fatiued and SOB although requires 1 or 2x a week \par Old IWMI pthall 2018 fixed inf defect no other changes \par HTN\par NIDDM\par AAA(-) carotids (-) \par EF 40% EF appears normal 2017\par 3-4+ MR PSAP 60 no change 2017 seems like PSAP more primary \par Afib\par Class I\par always has mild increase in alk phos sgpt mild increse to 44 no change in meds for now  \par Plan s/p cath right and left cath stale grafts elevated psap\par vpacing now more so but EF looks better \par saw 2nd opinion for clip felt not to be candidate \par also felt not bad MR so will see Dr Celis to exclude ulm HTN and repeat echo and BMP since feels better on entreesto and increased lasix still consider surgical candidate with nina at [place] if valvular\par [place] told PULM HTN mwan of 40 with PCW of 18 and + repsonse to Nipride therefore will get sleep study and V/Q scan (-) and has mod SAVI K+ 5.5 today 5.0 w\par going for f/u echo with [facility] EF 44% and Mod MR\par getting f/u echo by [facility] needs f/u labs for K+  \par

## 2020-11-30 NOTE — PHYSICAL EXAM
[General Appearance - Well Developed] : well developed [Normal Appearance] : normal appearance [Well Groomed] : well groomed [General Appearance - Well Nourished] : well nourished [No Deformities] : no deformities [General Appearance - In No Acute Distress] : no acute distress [Normal Conjunctiva] : the conjunctiva exhibited no abnormalities [Eyelids - No Xanthelasma] : the eyelids demonstrated no xanthelasmas [Normal Oral Mucosa] : normal oral mucosa [No Oral Pallor] : no oral pallor [No Oral Cyanosis] : no oral cyanosis [FreeTextEntry1] : No JVD [] : no respiratory distress [Auscultation Breath Sounds / Voice Sounds] : lungs were clear to auscultation bilaterally [Heart Sounds] : normal S1 and S2 [Arterial Pulses Normal] : the arterial pulses were normal [Edema] : no peripheral edema present [Irregularly Irregular] : the rhythm was irregularly irregular [Systolic grade ___/6] : A grade [unfilled]/6 systolic murmur was heard. [Nail Clubbing] : no clubbing of the fingernails [Cyanosis, Localized] : no localized cyanosis [Oriented To Time, Place, And Person] : oriented to person, place, and time

## 2020-12-21 ENCOUNTER — OUTPATIENT (OUTPATIENT)
Dept: OUTPATIENT SERVICES | Facility: HOSPITAL | Age: 80
LOS: 1 days | Discharge: HOME | End: 2020-12-21

## 2020-12-21 ENCOUNTER — APPOINTMENT (OUTPATIENT)
Dept: MEDICATION MANAGEMENT | Facility: CLINIC | Age: 80
End: 2020-12-21

## 2020-12-21 ENCOUNTER — APPOINTMENT (OUTPATIENT)
Dept: CARDIOLOGY | Facility: CLINIC | Age: 80
End: 2020-12-21
Payer: MEDICARE

## 2020-12-21 VITALS
DIASTOLIC BLOOD PRESSURE: 60 MMHG | WEIGHT: 162 LBS | TEMPERATURE: 97.1 F | SYSTOLIC BLOOD PRESSURE: 130 MMHG | HEIGHT: 70 IN | HEART RATE: 50 BPM | BODY MASS INDEX: 23.19 KG/M2

## 2020-12-21 VITALS — HEART RATE: 50 BPM | RESPIRATION RATE: 16 BRPM

## 2020-12-21 DIAGNOSIS — Z98.890 OTHER SPECIFIED POSTPROCEDURAL STATES: Chronic | ICD-10-CM

## 2020-12-21 DIAGNOSIS — Z95.1 PRESENCE OF AORTOCORONARY BYPASS GRAFT: Chronic | ICD-10-CM

## 2020-12-21 DIAGNOSIS — Z79.01 LONG TERM (CURRENT) USE OF ANTICOAGULANTS: ICD-10-CM

## 2020-12-21 DIAGNOSIS — Z79.01 ENCOUNTER FOR THERAPEUTIC DRUG LVL MONITORING: ICD-10-CM

## 2020-12-21 DIAGNOSIS — Z51.81 ENCOUNTER FOR THERAPEUTIC DRUG LVL MONITORING: ICD-10-CM

## 2020-12-21 DIAGNOSIS — I48.91 UNSPECIFIED ATRIAL FIBRILLATION: ICD-10-CM

## 2020-12-21 LAB
INR PPP: 2 RATIO
POCT-PROTHROMBIN TIME: 24.1 SECS
QUALITY CONTROL: YES

## 2020-12-21 PROCEDURE — 99213 OFFICE O/P EST LOW 20 MIN: CPT

## 2020-12-21 PROCEDURE — 93290 INTERROG DEV EVAL ICPMS IP: CPT

## 2020-12-21 PROCEDURE — 93282 PRGRMG EVAL IMPLANTABLE DFB: CPT

## 2020-12-21 RX ORDER — METOPROLOL SUCCINATE 25 MG/1
25 TABLET, EXTENDED RELEASE ORAL DAILY
Refills: 0 | Status: COMPLETED | COMMUNITY
End: 2020-12-21

## 2020-12-21 RX ORDER — TRIAMCINOLONE ACETONIDE 1 MG/G
0.1 CREAM TOPICAL
Qty: 30 | Refills: 0 | Status: ACTIVE | COMMUNITY
Start: 2020-10-12

## 2020-12-21 RX ORDER — PANTOPRAZOLE SODIUM 40 MG/1
40 GRANULE, DELAYED RELEASE ORAL
Refills: 0 | Status: COMPLETED | COMMUNITY
End: 2020-12-21

## 2020-12-21 RX ORDER — PANTOPRAZOLE 20 MG/1
20 TABLET, DELAYED RELEASE ORAL
Qty: 90 | Refills: 0 | Status: ACTIVE | COMMUNITY
Start: 2020-10-09

## 2020-12-21 RX ORDER — SIMVASTATIN 40 MG/1
40 TABLET, FILM COATED ORAL
Qty: 90 | Refills: 0 | Status: COMPLETED | COMMUNITY
Start: 2019-07-18 | End: 2020-12-21

## 2020-12-21 RX ORDER — WARFARIN 5 MG/1
5 TABLET ORAL
Refills: 0 | Status: COMPLETED | COMMUNITY
End: 2020-12-21

## 2020-12-21 RX ORDER — COLCHICINE 0.6 MG/1
0.6 TABLET ORAL
Qty: 180 | Refills: 0 | Status: ACTIVE | COMMUNITY
Start: 2020-06-23

## 2020-12-21 NOTE — ASSESSMENT
[FreeTextEntry1] : S/P CABG LIMA to LAD SVG to CX and RCA\par s/p MELBA severe MR had VTach during requiring cardioversion also ? with propoful and ? batch in hosp at that time \par LHC showed patent LIMA and occluded SVG's\par ICD placed Medtronic VVI LRL 40 \par felt not to be goood clip candidate ?TMVR also felt not to be candidate for implnted valve percuatenously \par Gout\par Old IWMI pthall 2018 fixed inf defect no other changes \par HTN\par NIDDM\par AAA(-) carotids (-) \par EF 40% EF appears normal 2017\par 3-4+ MR PSAP 60 no change 2017 seems like PSAP more primary \par Afib\par Class I\par always has mild increase in alk phos sgpt mild increse to 44 no change in meds for now  \par  s/p cath right and left cath stale grafts elevated psap\par vpacing now more so but EF looks better \par saw 2nd opinion for clip felt not to be candidate \par also felt not bad MR so will see Dr Celis to exclude ulm HTN and repeat echo and BMP since feels better on entreesto and increased lasix still consider surgical candidate with nina at [place] if valvular\par going for right heart cath and opinion from [facility] this friday told PULM HTN mwan of 40 with PCW of 18 and + repsonse to Nipride therefore will get sleep study and V/Q scan (-) and has mod SAVI K+ 5.5 today 5.0 w\par going for f/u echo with [facility] EF 44% and Mod MR\par s/p ATP and shock in July 27 nothing since \par getting f/u echo by [facility] needs f/u labs for K+  \par s/p ATP and shock in July 27 od 2020 and none since \par getting w/iu of alk phos but always bit elevated past \par

## 2021-01-25 ENCOUNTER — APPOINTMENT (OUTPATIENT)
Dept: MEDICATION MANAGEMENT | Facility: CLINIC | Age: 81
End: 2021-01-25

## 2021-01-25 ENCOUNTER — OUTPATIENT (OUTPATIENT)
Dept: OUTPATIENT SERVICES | Facility: HOSPITAL | Age: 81
LOS: 1 days | Discharge: HOME | End: 2021-01-25

## 2021-01-25 VITALS — HEART RATE: 54 BPM | RESPIRATION RATE: 16 BRPM

## 2021-01-25 DIAGNOSIS — I48.91 UNSPECIFIED ATRIAL FIBRILLATION: ICD-10-CM

## 2021-01-25 DIAGNOSIS — Z98.890 OTHER SPECIFIED POSTPROCEDURAL STATES: Chronic | ICD-10-CM

## 2021-01-25 DIAGNOSIS — Z79.01 LONG TERM (CURRENT) USE OF ANTICOAGULANTS: ICD-10-CM

## 2021-01-25 DIAGNOSIS — Z95.1 PRESENCE OF AORTOCORONARY BYPASS GRAFT: Chronic | ICD-10-CM

## 2021-01-25 LAB
INR PPP: 2.2 RATIO
POCT-PROTHROMBIN TIME: 26.3 SECS
QUALITY CONTROL: YES

## 2021-01-26 ENCOUNTER — APPOINTMENT (OUTPATIENT)
Dept: CARDIOLOGY | Facility: CLINIC | Age: 81
End: 2021-01-26

## 2021-03-01 ENCOUNTER — OUTPATIENT (OUTPATIENT)
Dept: OUTPATIENT SERVICES | Facility: HOSPITAL | Age: 81
LOS: 1 days | Discharge: HOME | End: 2021-03-01

## 2021-03-01 ENCOUNTER — APPOINTMENT (OUTPATIENT)
Dept: MEDICATION MANAGEMENT | Facility: CLINIC | Age: 81
End: 2021-03-01

## 2021-03-01 VITALS — HEART RATE: 50 BPM | RESPIRATION RATE: 16 BRPM

## 2021-03-01 DIAGNOSIS — Z98.890 OTHER SPECIFIED POSTPROCEDURAL STATES: Chronic | ICD-10-CM

## 2021-03-01 DIAGNOSIS — I48.91 UNSPECIFIED ATRIAL FIBRILLATION: ICD-10-CM

## 2021-03-01 DIAGNOSIS — Z95.1 PRESENCE OF AORTOCORONARY BYPASS GRAFT: Chronic | ICD-10-CM

## 2021-03-01 DIAGNOSIS — Z79.01 LONG TERM (CURRENT) USE OF ANTICOAGULANTS: ICD-10-CM

## 2021-03-01 LAB
INR PPP: 2.3 RATIO
POCT-PROTHROMBIN TIME: 27.4 SECS
QUALITY CONTROL: YES

## 2021-04-09 ENCOUNTER — RX RENEWAL (OUTPATIENT)
Age: 81
End: 2021-04-09

## 2021-04-12 ENCOUNTER — APPOINTMENT (OUTPATIENT)
Dept: CARDIOLOGY | Facility: CLINIC | Age: 81
End: 2021-04-12
Payer: MEDICARE

## 2021-04-12 ENCOUNTER — APPOINTMENT (OUTPATIENT)
Dept: MEDICATION MANAGEMENT | Facility: CLINIC | Age: 81
End: 2021-04-12

## 2021-04-12 ENCOUNTER — OUTPATIENT (OUTPATIENT)
Dept: OUTPATIENT SERVICES | Facility: HOSPITAL | Age: 81
LOS: 1 days | Discharge: HOME | End: 2021-04-12

## 2021-04-12 ENCOUNTER — RESULT CHARGE (OUTPATIENT)
Age: 81
End: 2021-04-12

## 2021-04-12 VITALS
DIASTOLIC BLOOD PRESSURE: 80 MMHG | HEART RATE: 44 BPM | TEMPERATURE: 97.9 F | BODY MASS INDEX: 24.2 KG/M2 | HEIGHT: 70 IN | SYSTOLIC BLOOD PRESSURE: 128 MMHG | WEIGHT: 169 LBS

## 2021-04-12 VITALS — RESPIRATION RATE: 16 BRPM

## 2021-04-12 DIAGNOSIS — Z79.01 LONG TERM (CURRENT) USE OF ANTICOAGULANTS: ICD-10-CM

## 2021-04-12 DIAGNOSIS — Z98.890 OTHER SPECIFIED POSTPROCEDURAL STATES: Chronic | ICD-10-CM

## 2021-04-12 DIAGNOSIS — I48.91 UNSPECIFIED ATRIAL FIBRILLATION: ICD-10-CM

## 2021-04-12 DIAGNOSIS — Z95.1 PRESENCE OF AORTOCORONARY BYPASS GRAFT: Chronic | ICD-10-CM

## 2021-04-12 LAB
INR PPP: 2.1 RATIO
POCT-PROTHROMBIN TIME: 24.9 SECS
QUALITY CONTROL: YES

## 2021-04-12 PROCEDURE — 99214 OFFICE O/P EST MOD 30 MIN: CPT

## 2021-04-12 PROCEDURE — 93282 PRGRMG EVAL IMPLANTABLE DFB: CPT

## 2021-04-12 RX ORDER — PREGABALIN 300 MG/1
CAPSULE ORAL
Refills: 0 | Status: DISCONTINUED | COMMUNITY
End: 2021-04-12

## 2021-04-12 NOTE — PHYSICAL EXAM
[General Appearance - Well Developed] : well developed [Normal Appearance] : normal appearance [Well Groomed] : well groomed [General Appearance - Well Nourished] : well nourished [No Deformities] : no deformities [General Appearance - In No Acute Distress] : no acute distress [Normal Conjunctiva] : the conjunctiva exhibited no abnormalities [Eyelids - No Xanthelasma] : the eyelids demonstrated no xanthelasmas [Normal Oral Mucosa] : normal oral mucosa [No Oral Pallor] : no oral pallor [No Oral Cyanosis] : no oral cyanosis [] : no respiratory distress [Auscultation Breath Sounds / Voice Sounds] : lungs were clear to auscultation bilaterally [Heart Sounds] : normal S1 and S2 [Arterial Pulses Normal] : the arterial pulses were normal [Edema] : no peripheral edema present [Irregularly Irregular] : the rhythm was irregularly irregular [Systolic grade ___/6] : A grade [unfilled]/6 systolic murmur was heard. [Bowel Sounds] : normal bowel sounds [Abdomen Soft] : soft [Nail Clubbing] : no clubbing of the fingernails [Cyanosis, Localized] : no localized cyanosis [Oriented To Time, Place, And Person] : oriented to person, place, and time [FreeTextEntry1] : No JVD

## 2021-04-12 NOTE — ASSESSMENT
[FreeTextEntry1] : S/P CABG LIMA to LAD SVG to CX and RCA\par s/p MELBA severe MR had VTach during requiring cardioversion also ? with propoful and ? batch in hosp at that time \par LHC showed patent LIMA and occluded SVG's\par ICD placed Medtronic VVI LRL 40 \par felt not to be goood clip candidate ?TMVR also felt not to be candidate for implnted valve percuatenously \par Gout\par Old IWMI pthall 2018 fixed inf defect no other changes \par HTN\par NIDDM\par AAA(-) carotids (-) \par EF 40% EF appears normal 2017\par Afib\par Class I\par always has mild increase in alk phos sgpt mild increse to 44 no change in meds for now  \par  s/p cath right and left cath stale grafts elevated psap\par vpacing now more so but EF looks better \par saw 2nd opinion for clip felt not to be candidate \par also felt not bad MR so will see Dr Celis to exclude ulm HTN and repeat echo and BMP since feels better on entreesto and increased lasix still consider surgical candidate with wood at [place] if valvular\par going for right heart cath and opinion from [facility] this friday told PULM HTN mwan of 40 with PCW of 18 and + repsonse to Nipride therefore will get sleep study and V/Q scan (-) and has mod SAVI K+ 5.5 today 5.0 w\par going for f/u echo with [facility] EF 44% and Mod MR\par s/p ATP and shock in July 27 nothing since \par getting f/u echo by [facility] needs f/u labs for K+  \par s/p ATP and shock in July 27 od 2020 and none since \par getting w/iu of alk phos but always bit elevated past \par device has  of 33% and bradycardia but pt feels well and w/o chf will not change should he become slower would require additional lead \par

## 2021-05-24 ENCOUNTER — RX RENEWAL (OUTPATIENT)
Age: 81
End: 2021-05-24

## 2021-05-25 ENCOUNTER — APPOINTMENT (OUTPATIENT)
Dept: MEDICATION MANAGEMENT | Facility: CLINIC | Age: 81
End: 2021-05-25

## 2021-05-25 ENCOUNTER — OUTPATIENT (OUTPATIENT)
Dept: OUTPATIENT SERVICES | Facility: HOSPITAL | Age: 81
LOS: 1 days | Discharge: HOME | End: 2021-05-25

## 2021-05-25 VITALS — HEART RATE: 52 BPM | RESPIRATION RATE: 16 BRPM

## 2021-05-25 DIAGNOSIS — Z98.890 OTHER SPECIFIED POSTPROCEDURAL STATES: Chronic | ICD-10-CM

## 2021-05-25 DIAGNOSIS — I48.91 UNSPECIFIED ATRIAL FIBRILLATION: ICD-10-CM

## 2021-05-25 DIAGNOSIS — Z79.01 LONG TERM (CURRENT) USE OF ANTICOAGULANTS: ICD-10-CM

## 2021-05-25 DIAGNOSIS — Z95.1 PRESENCE OF AORTOCORONARY BYPASS GRAFT: Chronic | ICD-10-CM

## 2021-05-25 LAB
INR PPP: 1.7 RATIO
POCT-PROTHROMBIN TIME: 20.8 SECS
QUALITY CONTROL: YES

## 2021-06-08 ENCOUNTER — APPOINTMENT (OUTPATIENT)
Dept: MEDICATION MANAGEMENT | Facility: CLINIC | Age: 81
End: 2021-06-08

## 2021-06-08 ENCOUNTER — OUTPATIENT (OUTPATIENT)
Dept: OUTPATIENT SERVICES | Facility: HOSPITAL | Age: 81
LOS: 1 days | Discharge: HOME | End: 2021-06-08

## 2021-06-08 VITALS — RESPIRATION RATE: 16 BRPM | HEART RATE: 54 BPM

## 2021-06-08 DIAGNOSIS — Z95.1 PRESENCE OF AORTOCORONARY BYPASS GRAFT: Chronic | ICD-10-CM

## 2021-06-08 DIAGNOSIS — I48.91 UNSPECIFIED ATRIAL FIBRILLATION: ICD-10-CM

## 2021-06-08 DIAGNOSIS — Z79.01 LONG TERM (CURRENT) USE OF ANTICOAGULANTS: ICD-10-CM

## 2021-06-08 DIAGNOSIS — Z98.890 OTHER SPECIFIED POSTPROCEDURAL STATES: Chronic | ICD-10-CM

## 2021-06-08 LAB
INR PPP: 2.1 RATIO
POCT-PROTHROMBIN TIME: 25.5 SECS
QUALITY CONTROL: YES

## 2021-07-07 ENCOUNTER — OUTPATIENT (OUTPATIENT)
Dept: OUTPATIENT SERVICES | Facility: HOSPITAL | Age: 81
LOS: 1 days | Discharge: HOME | End: 2021-07-07

## 2021-07-07 ENCOUNTER — APPOINTMENT (OUTPATIENT)
Dept: MEDICATION MANAGEMENT | Facility: CLINIC | Age: 81
End: 2021-07-07

## 2021-07-07 VITALS — HEART RATE: 52 BPM | RESPIRATION RATE: 16 BRPM

## 2021-07-07 DIAGNOSIS — I48.91 UNSPECIFIED ATRIAL FIBRILLATION: ICD-10-CM

## 2021-07-07 DIAGNOSIS — Z95.1 PRESENCE OF AORTOCORONARY BYPASS GRAFT: Chronic | ICD-10-CM

## 2021-07-07 DIAGNOSIS — Z98.890 OTHER SPECIFIED POSTPROCEDURAL STATES: Chronic | ICD-10-CM

## 2021-07-07 DIAGNOSIS — Z79.01 LONG TERM (CURRENT) USE OF ANTICOAGULANTS: ICD-10-CM

## 2021-07-07 LAB
INR PPP: 2.1 RATIO
POCT-PROTHROMBIN TIME: 24.9 SECS
QUALITY CONTROL: YES

## 2021-08-10 ENCOUNTER — APPOINTMENT (OUTPATIENT)
Dept: MEDICATION MANAGEMENT | Facility: CLINIC | Age: 81
End: 2021-08-10

## 2021-08-10 ENCOUNTER — OUTPATIENT (OUTPATIENT)
Dept: OUTPATIENT SERVICES | Facility: HOSPITAL | Age: 81
LOS: 1 days | Discharge: HOME | End: 2021-08-10

## 2021-08-10 VITALS — HEART RATE: 56 BPM

## 2021-08-10 DIAGNOSIS — Z98.890 OTHER SPECIFIED POSTPROCEDURAL STATES: Chronic | ICD-10-CM

## 2021-08-10 DIAGNOSIS — Z95.1 PRESENCE OF AORTOCORONARY BYPASS GRAFT: Chronic | ICD-10-CM

## 2021-08-10 DIAGNOSIS — I48.91 UNSPECIFIED ATRIAL FIBRILLATION: ICD-10-CM

## 2021-08-10 DIAGNOSIS — Z79.01 LONG TERM (CURRENT) USE OF ANTICOAGULANTS: ICD-10-CM

## 2021-08-10 LAB
INR PPP: 2.2 RATIO
POCT-PROTHROMBIN TIME: 25.9 SECS
QUALITY CONTROL: YES

## 2021-08-22 ENCOUNTER — RX RENEWAL (OUTPATIENT)
Age: 81
End: 2021-08-22

## 2021-08-22 RX ORDER — METFORMIN HYDROCHLORIDE 500 MG/1
500 TABLET, COATED ORAL DAILY
Qty: 90 | Refills: 3 | Status: ACTIVE | COMMUNITY
Start: 2020-11-12 | End: 1900-01-01

## 2021-08-30 ENCOUNTER — APPOINTMENT (OUTPATIENT)
Dept: CARDIOLOGY | Facility: CLINIC | Age: 81
End: 2021-08-30
Payer: MEDICARE

## 2021-08-30 ENCOUNTER — RESULT CHARGE (OUTPATIENT)
Age: 81
End: 2021-08-30

## 2021-08-30 VITALS
DIASTOLIC BLOOD PRESSURE: 70 MMHG | SYSTOLIC BLOOD PRESSURE: 130 MMHG | HEART RATE: 51 BPM | BODY MASS INDEX: 23.91 KG/M2 | TEMPERATURE: 97.8 F | HEIGHT: 70 IN | WEIGHT: 167 LBS

## 2021-08-30 DIAGNOSIS — I34.0 NONRHEUMATIC MITRAL (VALVE) INSUFFICIENCY: ICD-10-CM

## 2021-08-30 PROCEDURE — 93000 ELECTROCARDIOGRAM COMPLETE: CPT

## 2021-08-30 PROCEDURE — 99214 OFFICE O/P EST MOD 30 MIN: CPT

## 2021-08-30 NOTE — ASSESSMENT
[FreeTextEntry1] : S/P CABG LIMA to LAD SVG to CX and RCA\par s/p MELBA severe MR had VTach during requiring cardioversion also ? with propoful and ? batch in hosp at that time \par LHC showed patent LIMA and occluded SVG's\par ICD placed Medtronic VVI LRL 40 \par felt not to be goood clip candidate ?TMVR also felt not to be candidate for implnted valve percuatenously \par Gout\par Old IWMI pthall 2018 fixed inf defect no other changes \par HTN\par NIDDM\par AAA(-) carotids (-) \par EF 40% EF appears normal 2017\par Afib\par Class I\par always has mild increase in alk phos sgpt mild increse to 44 no change in meds for now  \par  s/p cath right and left cath stale grafts elevated psap\par vpacing now more so but EF looks better \par saw 2nd opinion for clip felt not to be candidate \par also felt not bad MR so will see Dr Celis to exclude ulm HTN and repeat echo and BMP since feels better on entreesto and increased lasix still consider surgical candidate with wood at [place] if valvular\par going for right heart cath and opinion from [facility] this friday told PULM HTN mwan of 40 with PCW of 18 and + repsonse to Nipride therefore will get sleep study and V/Q scan (-) and has mod SAVI K+ 5.5 today 5.0 w\par going for f/u echo with [facility] EF 44% and Mod MR\par s/p ATP and shock in July 27 nothing since   \par s/p ATP and shock in July 27 2020 and none since \par getting w/iu of alk phos but always bit elevated past \par device has  of 33% and bradycardia but pt feels well and w/o chf will not change should he become slower would require additional lead ( today only 23% pacing and feels well optivol(-) and clinically w/o chf also EF 44%\par

## 2021-08-30 NOTE — PHYSICAL EXAM
[General Appearance - Well Developed] : well developed [Normal Appearance] : normal appearance [Well Groomed] : well groomed [General Appearance - Well Nourished] : well nourished [No Deformities] : no deformities [General Appearance - In No Acute Distress] : no acute distress [Normal Conjunctiva] : the conjunctiva exhibited no abnormalities [Eyelids - No Xanthelasma] : the eyelids demonstrated no xanthelasmas [Normal Oral Mucosa] : normal oral mucosa [No Oral Pallor] : no oral pallor [No Oral Cyanosis] : no oral cyanosis [] : no respiratory distress [Auscultation Breath Sounds / Voice Sounds] : lungs were clear to auscultation bilaterally [Heart Sounds] : normal S1 and S2 [Arterial Pulses Normal] : the arterial pulses were normal [Edema] : no peripheral edema present [Irregularly Irregular] : the rhythm was irregularly irregular [Systolic grade ___/6] : A grade [unfilled]/6 systolic murmur was heard. [Abdomen Soft] : soft [Bowel Sounds] : normal bowel sounds [Nail Clubbing] : no clubbing of the fingernails [Cyanosis, Localized] : no localized cyanosis [Oriented To Time, Place, And Person] : oriented to person, place, and time [FreeTextEntry1] : No JVD

## 2021-09-14 ENCOUNTER — OUTPATIENT (OUTPATIENT)
Dept: OUTPATIENT SERVICES | Facility: HOSPITAL | Age: 81
LOS: 1 days | Discharge: HOME | End: 2021-09-14

## 2021-09-14 ENCOUNTER — APPOINTMENT (OUTPATIENT)
Dept: MEDICATION MANAGEMENT | Facility: CLINIC | Age: 81
End: 2021-09-14

## 2021-09-14 VITALS — HEART RATE: 52 BPM | RESPIRATION RATE: 16 BRPM

## 2021-09-14 DIAGNOSIS — I48.91 UNSPECIFIED ATRIAL FIBRILLATION: ICD-10-CM

## 2021-09-14 DIAGNOSIS — Z79.01 LONG TERM (CURRENT) USE OF ANTICOAGULANTS: ICD-10-CM

## 2021-09-14 DIAGNOSIS — Z98.890 OTHER SPECIFIED POSTPROCEDURAL STATES: Chronic | ICD-10-CM

## 2021-09-14 DIAGNOSIS — Z95.1 PRESENCE OF AORTOCORONARY BYPASS GRAFT: Chronic | ICD-10-CM

## 2021-09-14 LAB
INR PPP: 2.4 RATIO
POCT-PROTHROMBIN TIME: 28.8 SECS
QUALITY CONTROL: YES

## 2021-10-19 ENCOUNTER — OUTPATIENT (OUTPATIENT)
Dept: OUTPATIENT SERVICES | Facility: HOSPITAL | Age: 81
LOS: 1 days | Discharge: HOME | End: 2021-10-19

## 2021-10-19 ENCOUNTER — APPOINTMENT (OUTPATIENT)
Dept: MEDICATION MANAGEMENT | Facility: CLINIC | Age: 81
End: 2021-10-19

## 2021-10-19 VITALS — HEART RATE: 48 BPM | RESPIRATION RATE: 16 BRPM

## 2021-10-19 DIAGNOSIS — Z95.1 PRESENCE OF AORTOCORONARY BYPASS GRAFT: Chronic | ICD-10-CM

## 2021-10-19 DIAGNOSIS — I48.91 UNSPECIFIED ATRIAL FIBRILLATION: ICD-10-CM

## 2021-10-19 DIAGNOSIS — Z79.01 LONG TERM (CURRENT) USE OF ANTICOAGULANTS: ICD-10-CM

## 2021-10-19 DIAGNOSIS — Z98.890 OTHER SPECIFIED POSTPROCEDURAL STATES: Chronic | ICD-10-CM

## 2021-10-19 LAB
INR PPP: 2.8 RATIO
POCT-PROTHROMBIN TIME: 33.2 SECS
QUALITY CONTROL: YES

## 2021-10-29 ENCOUNTER — NON-APPOINTMENT (OUTPATIENT)
Age: 81
End: 2021-10-29

## 2021-10-29 ENCOUNTER — APPOINTMENT (OUTPATIENT)
Dept: CARDIOLOGY | Facility: CLINIC | Age: 81
End: 2021-10-29
Payer: MEDICARE

## 2021-10-29 PROCEDURE — 93295 DEV INTERROG REMOTE 1/2/MLT: CPT

## 2021-10-29 PROCEDURE — 93296 REM INTERROG EVL PM/IDS: CPT

## 2021-11-16 ENCOUNTER — RX RENEWAL (OUTPATIENT)
Age: 81
End: 2021-11-16

## 2021-11-16 RX ORDER — FUROSEMIDE 20 MG/1
20 TABLET ORAL DAILY
Qty: 180 | Refills: 3 | Status: ACTIVE | COMMUNITY
Start: 2020-11-12 | End: 1900-01-01

## 2021-11-30 ENCOUNTER — APPOINTMENT (OUTPATIENT)
Dept: MEDICATION MANAGEMENT | Facility: CLINIC | Age: 81
End: 2021-11-30

## 2021-11-30 ENCOUNTER — OUTPATIENT (OUTPATIENT)
Dept: OUTPATIENT SERVICES | Facility: HOSPITAL | Age: 81
LOS: 1 days | Discharge: HOME | End: 2021-11-30

## 2021-11-30 VITALS — RESPIRATION RATE: 16 BRPM | HEART RATE: 54 BPM

## 2021-11-30 DIAGNOSIS — Z79.01 LONG TERM (CURRENT) USE OF ANTICOAGULANTS: ICD-10-CM

## 2021-11-30 DIAGNOSIS — Z98.890 OTHER SPECIFIED POSTPROCEDURAL STATES: Chronic | ICD-10-CM

## 2021-11-30 DIAGNOSIS — Z95.1 PRESENCE OF AORTOCORONARY BYPASS GRAFT: Chronic | ICD-10-CM

## 2021-11-30 DIAGNOSIS — I48.91 UNSPECIFIED ATRIAL FIBRILLATION: ICD-10-CM

## 2021-11-30 LAB
INR PPP: 3.4 RATIO
POCT-PROTHROMBIN TIME: 40.3 SECS
QUALITY CONTROL: YES

## 2021-12-01 ENCOUNTER — APPOINTMENT (OUTPATIENT)
Dept: CARDIOLOGY | Facility: CLINIC | Age: 81
End: 2021-12-01
Payer: MEDICARE

## 2021-12-01 ENCOUNTER — RESULT CHARGE (OUTPATIENT)
Age: 81
End: 2021-12-01

## 2021-12-01 VITALS
SYSTOLIC BLOOD PRESSURE: 119 MMHG | WEIGHT: 166 LBS | HEIGHT: 70 IN | DIASTOLIC BLOOD PRESSURE: 68 MMHG | HEART RATE: 51 BPM | BODY MASS INDEX: 23.77 KG/M2

## 2021-12-01 DIAGNOSIS — I50.40 UNSPECIFIED COMBINED SYSTOLIC (CONGESTIVE) AND DIASTOLIC (CONGESTIVE) HEART FAILURE: ICD-10-CM

## 2021-12-01 DIAGNOSIS — Z95.810 PRESENCE OF AUTOMATIC (IMPLANTABLE) CARDIAC DEFIBRILLATOR: ICD-10-CM

## 2021-12-01 DIAGNOSIS — I48.91 UNSPECIFIED ATRIAL FIBRILLATION: ICD-10-CM

## 2021-12-01 DIAGNOSIS — I42.9 CARDIOMYOPATHY, UNSPECIFIED: ICD-10-CM

## 2021-12-01 PROCEDURE — 99213 OFFICE O/P EST LOW 20 MIN: CPT

## 2021-12-01 PROCEDURE — 93280 PM DEVICE PROGR EVAL DUAL: CPT

## 2021-12-01 PROCEDURE — 93290 INTERROG DEV EVAL ICPMS IP: CPT

## 2021-12-01 RX ORDER — WARFARIN 2.5 MG/1
2.5 TABLET ORAL DAILY
Qty: 180 | Refills: 3 | Status: DISCONTINUED | COMMUNITY
Start: 2020-11-13 | End: 2021-12-01

## 2021-12-01 NOTE — ASSESSMENT
[FreeTextEntry1] : S/P CABG LIMA to LAD SVG to CX and RCA\par s/p MELBA severe MR had VTach during requiring cardioversion also ? with propoful and ? batch in hosp at that time \par LHC showed patent LIMA and occluded SVG's\par ICD placed Medtronic VVI LRL 40 \par felt not to be goood clip candidate ?TMVR also felt not to be candidate for implanted valve percuatenously \par Gout\par Old IWMI pthall 2018 fixed inf defect no other changes \par HTN\par NIDDM\par AAA(-) carotids (-) \par EF 40% EF appears normal 2017\par Afib\par Class I\par v-v 175 was 49 past impedences, rwaves , and trsholds normal \par always has mild increase in alk phos sgpt mild increse to 44 no change in meds for now  \par  s/p cath right and left cath stale grafts elevated psap\par vpacing now more so but EF looks better \par saw 2nd opinion for clip felt not to be candidate \par also felt not bad MR so will see Dr Celis to exclude ulm HTN and repeat echo and BMP since feels better on entreesto and increased lasix still consider surgical candidate with nina at [place] if valvular\par going for right heart cath and opinion from [facility] this friday told PULM HTN mwan of 40 with PCW of 18 and + repsonse to Nipride therefore will get sleep study and V/Q scan (-) and has mod SAVI K+ 5.5 today 5.0 w\par going for f/u echo with [facility] EF 44% and Mod MR\par s/p ATP and shock in July 27 nothing since   \par s/p ATP and shock in July 27 2020 and none since \par getting w/iu of alk phos but always bit elevated past \par device has  of 33% and bradycardia but pt feels well and w/o chf will not change should he become slower would require additional lead ( today only 37  pacing and feels well optivol(-) and clinically w/o chf also EF 44%\par

## 2021-12-14 ENCOUNTER — OUTPATIENT (OUTPATIENT)
Dept: OUTPATIENT SERVICES | Facility: HOSPITAL | Age: 81
LOS: 1 days | Discharge: HOME | End: 2021-12-14

## 2021-12-14 ENCOUNTER — APPOINTMENT (OUTPATIENT)
Dept: MEDICATION MANAGEMENT | Facility: CLINIC | Age: 81
End: 2021-12-14

## 2021-12-14 VITALS — HEART RATE: 52 BPM

## 2021-12-14 DIAGNOSIS — Z98.890 OTHER SPECIFIED POSTPROCEDURAL STATES: Chronic | ICD-10-CM

## 2021-12-14 DIAGNOSIS — I48.91 UNSPECIFIED ATRIAL FIBRILLATION: ICD-10-CM

## 2021-12-14 DIAGNOSIS — Z79.01 LONG TERM (CURRENT) USE OF ANTICOAGULANTS: ICD-10-CM

## 2021-12-14 DIAGNOSIS — Z95.1 PRESENCE OF AORTOCORONARY BYPASS GRAFT: Chronic | ICD-10-CM

## 2021-12-14 LAB
INR PPP: 2.7 RATIO
POCT-PROTHROMBIN TIME: 32.3 SECS
QUALITY CONTROL: YES

## 2022-01-11 ENCOUNTER — APPOINTMENT (OUTPATIENT)
Dept: MEDICATION MANAGEMENT | Facility: CLINIC | Age: 82
End: 2022-01-11

## 2022-01-11 ENCOUNTER — OUTPATIENT (OUTPATIENT)
Dept: OUTPATIENT SERVICES | Facility: HOSPITAL | Age: 82
LOS: 1 days | Discharge: HOME | End: 2022-01-11

## 2022-01-11 VITALS — HEART RATE: 60 BPM | RESPIRATION RATE: 16 BRPM

## 2022-01-11 DIAGNOSIS — Z95.1 PRESENCE OF AORTOCORONARY BYPASS GRAFT: Chronic | ICD-10-CM

## 2022-01-11 DIAGNOSIS — Z79.01 LONG TERM (CURRENT) USE OF ANTICOAGULANTS: ICD-10-CM

## 2022-01-11 DIAGNOSIS — I48.91 UNSPECIFIED ATRIAL FIBRILLATION: ICD-10-CM

## 2022-01-11 DIAGNOSIS — Z98.890 OTHER SPECIFIED POSTPROCEDURAL STATES: Chronic | ICD-10-CM

## 2022-01-11 LAB
INR PPP: 2.4 RATIO
POCT-PROTHROMBIN TIME: 28.4 SECS
QUALITY CONTROL: YES

## 2022-01-31 ENCOUNTER — APPOINTMENT (OUTPATIENT)
Dept: CARDIOLOGY | Facility: CLINIC | Age: 82
End: 2022-01-31
Payer: MEDICARE

## 2022-01-31 ENCOUNTER — NON-APPOINTMENT (OUTPATIENT)
Age: 82
End: 2022-01-31

## 2022-01-31 PROCEDURE — 93295 DEV INTERROG REMOTE 1/2/MLT: CPT

## 2022-01-31 PROCEDURE — 93296 REM INTERROG EVL PM/IDS: CPT

## 2022-02-09 ENCOUNTER — APPOINTMENT (OUTPATIENT)
Dept: CARDIOLOGY | Facility: CLINIC | Age: 82
End: 2022-02-09
Payer: MEDICARE

## 2022-02-09 VITALS
HEART RATE: 62 BPM | DIASTOLIC BLOOD PRESSURE: 70 MMHG | WEIGHT: 169 LBS | OXYGEN SATURATION: 96 % | BODY MASS INDEX: 24.2 KG/M2 | RESPIRATION RATE: 16 BRPM | TEMPERATURE: 96 F | SYSTOLIC BLOOD PRESSURE: 120 MMHG | HEIGHT: 70 IN

## 2022-02-09 PROCEDURE — 99214 OFFICE O/P EST MOD 30 MIN: CPT | Mod: 25

## 2022-02-09 PROCEDURE — 93000 ELECTROCARDIOGRAM COMPLETE: CPT | Mod: 59

## 2022-02-09 PROCEDURE — 93290 INTERROG DEV EVAL ICPMS IP: CPT

## 2022-02-09 PROCEDURE — 93282 PRGRMG EVAL IMPLANTABLE DFB: CPT

## 2022-02-13 NOTE — PHYSICAL EXAM
[General Appearance - Well Developed] : well developed [Normal Appearance] : normal appearance [Well Groomed] : well groomed [General Appearance - Well Nourished] : well nourished [No Deformities] : no deformities [General Appearance - In No Acute Distress] : no acute distress [Heart Rate And Rhythm] : heart rate and rhythm were normal [Heart Sounds] : normal S1 and S2 [Systolic grade ___/6] : A grade [unfilled]/6 systolic murmur was heard. [Respiration, Rhythm And Depth] : normal respiratory rhythm and effort [Exaggerated Use Of Accessory Muscles For Inspiration] : no accessory muscle use [Auscultation Breath Sounds / Voice Sounds] : lungs were clear to auscultation bilaterally [Clean] : clean [Dry] : dry [Well-Healed] : well-healed [Nail Clubbing] : no clubbing of the fingernails [Cyanosis, Localized] : no localized cyanosis [Petechial Hemorrhages (___cm)] : no petechial hemorrhages [] : no ischemic changes

## 2022-02-13 NOTE — ASSESSMENT
[FreeTextEntry1] : ## Ischemic Cardiomyopathy s/p SC-ICD (MDT, 18, Non-dep)\par ## Mod to severe MR\par ## Persistent valvular AF\par \par - ICD interrogation shows normally functioning SC-ICD. Battery life ok. Optivol above threshold- rising for few months. No new events. \par - Echo to assess LVEF and MR\par - If EF is reduced (<40%), we will discuss possibilities of DCCV +/- CRT upgrade (if pacing >40%)\par - On warfarin. No bleeding issues.\par - On BB and entresto\par - Lasix- increased dosing for fluid retention- feels better\par - Remote Monitoring \par - Offered referral to Cardio- patient will call us back -recently moved to [place].\par - Return in 6 months

## 2022-02-13 NOTE — PROCEDURE
[No] : not [NSR] : normal sinus rhythm [ICD] : Implantable cardioverter-defibrillator [VVI] : VVI [Voltage: ___ volts] : Voltage was [unfilled] volts [Charge Time: ___ sec] : charge time was [unfilled] seconds [Longevity: ___ months] : The estimated remaining battery life is [unfilled] months [Threshold Testing Performed] : Threshold testing was performed [Lead Imp:  ___ohms] : lead impedance was [unfilled] ohms [Sensing Amplitude ___mv] : sensing amplitude was [unfilled] mv [___V @] : [unfilled] V [___ ms] : [unfilled] ms [Programmed for Longevity] : output reprogrammed for improved battery longevity [Sense ___ %] : Sense [unfilled]% [Pace ___ %] : Pace [unfilled]% [de-identified] : Medtronic [de-identified] : DARIUS OCONNOR [de-identified] : [id number] [de-identified] : 10/11/18 [de-identified] : 40 [de-identified] : False AF episodes\par Optivol exceeded threshold.

## 2022-02-13 NOTE — HISTORY OF PRESENT ILLNESS
[de-identified] : I had a pleasure of seeing Mr. RUSSELL for consultation for device care. He was previously followed by Dr. Griffiths.\par \par Mr. RUSSELL is a 81 year-year old male with history of CAD/CABG, ischemic cardiomyopathy s/p ICD (MDT, Non-dep), severe MR, persistent valvular AF, asthma, HTN, DM, DL, gout is here to establish care.\par \par NYHA-2\par \par Had MR- assessed by two different institute/CTS- showed not a good candidate for clip or surgery.\par \par Denies chest pain, shortness of breath, palpitation, dizziness or LOC except noted above.\par \par EKG (2/9/22): AF@ 62, , QRSd 112\par No recent Echo\par Cardio: None

## 2022-02-22 ENCOUNTER — OUTPATIENT (OUTPATIENT)
Dept: OUTPATIENT SERVICES | Facility: HOSPITAL | Age: 82
LOS: 1 days | Discharge: HOME | End: 2022-02-22

## 2022-02-22 ENCOUNTER — APPOINTMENT (OUTPATIENT)
Dept: MEDICATION MANAGEMENT | Facility: CLINIC | Age: 82
End: 2022-02-22

## 2022-02-22 DIAGNOSIS — Z98.890 OTHER SPECIFIED POSTPROCEDURAL STATES: Chronic | ICD-10-CM

## 2022-02-22 DIAGNOSIS — Z95.1 PRESENCE OF AORTOCORONARY BYPASS GRAFT: Chronic | ICD-10-CM

## 2022-02-22 DIAGNOSIS — Z79.01 LONG TERM (CURRENT) USE OF ANTICOAGULANTS: ICD-10-CM

## 2022-02-22 DIAGNOSIS — I48.91 UNSPECIFIED ATRIAL FIBRILLATION: ICD-10-CM

## 2022-02-22 LAB
INR PPP: 2.7 RATIO
QUALITY CONTROL: YES

## 2022-03-28 ENCOUNTER — RX RENEWAL (OUTPATIENT)
Age: 82
End: 2022-03-28

## 2022-03-28 RX ORDER — EZETIMIBE 10 MG/1
10 TABLET ORAL
Qty: 90 | Refills: 3 | Status: ACTIVE | COMMUNITY
Start: 2021-04-09 | End: 1900-01-01

## 2022-03-29 ENCOUNTER — APPOINTMENT (OUTPATIENT)
Age: 82
End: 2022-03-29

## 2022-04-05 ENCOUNTER — OUTPATIENT (OUTPATIENT)
Dept: OUTPATIENT SERVICES | Facility: HOSPITAL | Age: 82
LOS: 1 days | Discharge: HOME | End: 2022-04-05

## 2022-04-05 ENCOUNTER — APPOINTMENT (OUTPATIENT)
Dept: MEDICATION MANAGEMENT | Facility: CLINIC | Age: 82
End: 2022-04-05

## 2022-04-05 VITALS — HEART RATE: 56 BPM

## 2022-04-05 DIAGNOSIS — Z98.890 OTHER SPECIFIED POSTPROCEDURAL STATES: Chronic | ICD-10-CM

## 2022-04-05 DIAGNOSIS — I48.91 UNSPECIFIED ATRIAL FIBRILLATION: ICD-10-CM

## 2022-04-05 DIAGNOSIS — Z79.01 LONG TERM (CURRENT) USE OF ANTICOAGULANTS: ICD-10-CM

## 2022-04-05 DIAGNOSIS — Z95.1 PRESENCE OF AORTOCORONARY BYPASS GRAFT: Chronic | ICD-10-CM

## 2022-04-05 LAB
INR PPP: 3.1 RATIO
POCT-PROTHROMBIN TIME: 36.8 SECS
QUALITY CONTROL: YES

## 2022-04-20 ENCOUNTER — OUTPATIENT (OUTPATIENT)
Dept: OUTPATIENT SERVICES | Facility: HOSPITAL | Age: 82
LOS: 1 days | Discharge: HOME | End: 2022-04-20

## 2022-04-20 ENCOUNTER — APPOINTMENT (OUTPATIENT)
Dept: MEDICATION MANAGEMENT | Facility: CLINIC | Age: 82
End: 2022-04-20

## 2022-04-20 DIAGNOSIS — I48.91 UNSPECIFIED ATRIAL FIBRILLATION: ICD-10-CM

## 2022-04-20 DIAGNOSIS — Z79.01 LONG TERM (CURRENT) USE OF ANTICOAGULANTS: ICD-10-CM

## 2022-04-20 DIAGNOSIS — Z95.1 PRESENCE OF AORTOCORONARY BYPASS GRAFT: Chronic | ICD-10-CM

## 2022-04-20 DIAGNOSIS — Z98.890 OTHER SPECIFIED POSTPROCEDURAL STATES: Chronic | ICD-10-CM

## 2022-04-20 LAB — INR PPP: 3.4

## 2022-04-24 ENCOUNTER — RX RENEWAL (OUTPATIENT)
Age: 82
End: 2022-04-24

## 2022-04-24 RX ORDER — SIMVASTATIN 20 MG/1
20 TABLET, FILM COATED ORAL DAILY
Qty: 90 | Refills: 3 | Status: ACTIVE | COMMUNITY
Start: 2021-05-24 | End: 1900-01-01

## 2022-04-28 ENCOUNTER — OUTPATIENT (OUTPATIENT)
Dept: OUTPATIENT SERVICES | Facility: HOSPITAL | Age: 82
LOS: 1 days | Discharge: HOME | End: 2022-04-28

## 2022-04-28 ENCOUNTER — APPOINTMENT (OUTPATIENT)
Dept: MEDICATION MANAGEMENT | Facility: CLINIC | Age: 82
End: 2022-04-28

## 2022-04-28 DIAGNOSIS — I48.91 UNSPECIFIED ATRIAL FIBRILLATION: ICD-10-CM

## 2022-04-28 DIAGNOSIS — Z95.1 PRESENCE OF AORTOCORONARY BYPASS GRAFT: Chronic | ICD-10-CM

## 2022-04-28 DIAGNOSIS — Z79.01 LONG TERM (CURRENT) USE OF ANTICOAGULANTS: ICD-10-CM

## 2022-04-28 DIAGNOSIS — Z98.890 OTHER SPECIFIED POSTPROCEDURAL STATES: Chronic | ICD-10-CM

## 2022-05-03 ENCOUNTER — APPOINTMENT (OUTPATIENT)
Dept: MEDICATION MANAGEMENT | Facility: CLINIC | Age: 82
End: 2022-05-03

## 2022-05-09 ENCOUNTER — APPOINTMENT (OUTPATIENT)
Dept: MEDICATION MANAGEMENT | Facility: CLINIC | Age: 82
End: 2022-05-09

## 2022-05-09 ENCOUNTER — OUTPATIENT (OUTPATIENT)
Dept: OUTPATIENT SERVICES | Facility: HOSPITAL | Age: 82
LOS: 1 days | Discharge: HOME | End: 2022-05-09

## 2022-05-09 DIAGNOSIS — Z98.890 OTHER SPECIFIED POSTPROCEDURAL STATES: Chronic | ICD-10-CM

## 2022-05-09 DIAGNOSIS — Z95.1 PRESENCE OF AORTOCORONARY BYPASS GRAFT: Chronic | ICD-10-CM

## 2022-05-09 DIAGNOSIS — Z79.01 LONG TERM (CURRENT) USE OF ANTICOAGULANTS: ICD-10-CM

## 2022-05-09 DIAGNOSIS — I48.91 UNSPECIFIED ATRIAL FIBRILLATION: ICD-10-CM

## 2022-05-09 LAB — INR PPP: 3.2

## 2022-05-11 ENCOUNTER — NON-APPOINTMENT (OUTPATIENT)
Age: 82
End: 2022-05-11

## 2022-05-11 ENCOUNTER — APPOINTMENT (OUTPATIENT)
Dept: CARDIOLOGY | Facility: CLINIC | Age: 82
End: 2022-05-11
Payer: MEDICARE

## 2022-05-11 PROCEDURE — 93296 REM INTERROG EVL PM/IDS: CPT

## 2022-05-11 PROCEDURE — 93295 DEV INTERROG REMOTE 1/2/MLT: CPT

## 2022-05-16 ENCOUNTER — APPOINTMENT (OUTPATIENT)
Dept: MEDICATION MANAGEMENT | Facility: CLINIC | Age: 82
End: 2022-05-16

## 2022-05-16 ENCOUNTER — OUTPATIENT (OUTPATIENT)
Dept: OUTPATIENT SERVICES | Facility: HOSPITAL | Age: 82
LOS: 1 days | Discharge: HOME | End: 2022-05-16

## 2022-05-16 DIAGNOSIS — I48.91 UNSPECIFIED ATRIAL FIBRILLATION: ICD-10-CM

## 2022-05-16 DIAGNOSIS — Z95.1 PRESENCE OF AORTOCORONARY BYPASS GRAFT: Chronic | ICD-10-CM

## 2022-05-16 DIAGNOSIS — Z79.01 LONG TERM (CURRENT) USE OF ANTICOAGULANTS: ICD-10-CM

## 2022-05-16 DIAGNOSIS — Z98.890 OTHER SPECIFIED POSTPROCEDURAL STATES: Chronic | ICD-10-CM

## 2022-05-16 LAB — INR PPP: 2.5

## 2022-05-23 ENCOUNTER — APPOINTMENT (OUTPATIENT)
Dept: MEDICATION MANAGEMENT | Facility: CLINIC | Age: 82
End: 2022-05-23

## 2022-05-23 ENCOUNTER — OUTPATIENT (OUTPATIENT)
Dept: OUTPATIENT SERVICES | Facility: HOSPITAL | Age: 82
LOS: 1 days | Discharge: HOME | End: 2022-05-23

## 2022-05-23 DIAGNOSIS — Z79.01 LONG TERM (CURRENT) USE OF ANTICOAGULANTS: ICD-10-CM

## 2022-05-23 DIAGNOSIS — Z98.890 OTHER SPECIFIED POSTPROCEDURAL STATES: Chronic | ICD-10-CM

## 2022-05-23 DIAGNOSIS — I48.91 UNSPECIFIED ATRIAL FIBRILLATION: ICD-10-CM

## 2022-05-23 DIAGNOSIS — Z95.1 PRESENCE OF AORTOCORONARY BYPASS GRAFT: Chronic | ICD-10-CM

## 2022-05-23 LAB — INR PPP: 2.4

## 2022-06-06 ENCOUNTER — APPOINTMENT (OUTPATIENT)
Dept: CARDIOLOGY | Facility: CLINIC | Age: 82
End: 2022-06-06

## 2022-06-10 ENCOUNTER — OUTPATIENT (OUTPATIENT)
Dept: OUTPATIENT SERVICES | Facility: HOSPITAL | Age: 82
LOS: 1 days | Discharge: HOME | End: 2022-06-10

## 2022-06-10 ENCOUNTER — APPOINTMENT (OUTPATIENT)
Dept: MEDICATION MANAGEMENT | Facility: CLINIC | Age: 82
End: 2022-06-10

## 2022-06-10 DIAGNOSIS — I48.91 UNSPECIFIED ATRIAL FIBRILLATION: ICD-10-CM

## 2022-06-10 DIAGNOSIS — Z79.01 LONG TERM (CURRENT) USE OF ANTICOAGULANTS: ICD-10-CM

## 2022-06-10 DIAGNOSIS — Z98.890 OTHER SPECIFIED POSTPROCEDURAL STATES: Chronic | ICD-10-CM

## 2022-06-10 DIAGNOSIS — Z95.1 PRESENCE OF AORTOCORONARY BYPASS GRAFT: Chronic | ICD-10-CM

## 2022-06-10 LAB — INR PPP: 2.6

## 2022-06-30 ENCOUNTER — OUTPATIENT (OUTPATIENT)
Dept: OUTPATIENT SERVICES | Facility: HOSPITAL | Age: 82
LOS: 1 days | Discharge: HOME | End: 2022-06-30

## 2022-06-30 ENCOUNTER — APPOINTMENT (OUTPATIENT)
Dept: MEDICATION MANAGEMENT | Facility: CLINIC | Age: 82
End: 2022-06-30

## 2022-06-30 DIAGNOSIS — Z98.890 OTHER SPECIFIED POSTPROCEDURAL STATES: Chronic | ICD-10-CM

## 2022-06-30 DIAGNOSIS — Z95.1 PRESENCE OF AORTOCORONARY BYPASS GRAFT: Chronic | ICD-10-CM

## 2022-06-30 DIAGNOSIS — I48.91 UNSPECIFIED ATRIAL FIBRILLATION: ICD-10-CM

## 2022-06-30 DIAGNOSIS — Z79.01 LONG TERM (CURRENT) USE OF ANTICOAGULANTS: ICD-10-CM

## 2022-06-30 LAB — INR PPP: 2.3

## 2022-07-21 ENCOUNTER — APPOINTMENT (OUTPATIENT)
Dept: MEDICATION MANAGEMENT | Facility: CLINIC | Age: 82
End: 2022-07-21

## 2022-07-21 ENCOUNTER — OUTPATIENT (OUTPATIENT)
Dept: OUTPATIENT SERVICES | Facility: HOSPITAL | Age: 82
LOS: 1 days | Discharge: HOME | End: 2022-07-21

## 2022-07-21 DIAGNOSIS — Z79.01 LONG TERM (CURRENT) USE OF ANTICOAGULANTS: ICD-10-CM

## 2022-07-21 DIAGNOSIS — Z95.1 PRESENCE OF AORTOCORONARY BYPASS GRAFT: Chronic | ICD-10-CM

## 2022-07-21 DIAGNOSIS — Z98.890 OTHER SPECIFIED POSTPROCEDURAL STATES: Chronic | ICD-10-CM

## 2022-07-21 DIAGNOSIS — I48.91 UNSPECIFIED ATRIAL FIBRILLATION: ICD-10-CM

## 2022-07-21 LAB — INR PPP: 2.7

## 2022-08-10 ENCOUNTER — NON-APPOINTMENT (OUTPATIENT)
Age: 82
End: 2022-08-10

## 2022-08-10 ENCOUNTER — APPOINTMENT (OUTPATIENT)
Dept: CARDIOLOGY | Facility: CLINIC | Age: 82
End: 2022-08-10

## 2022-08-10 PROCEDURE — 93296 REM INTERROG EVL PM/IDS: CPT

## 2022-08-10 PROCEDURE — 93295 DEV INTERROG REMOTE 1/2/MLT: CPT

## 2022-08-18 ENCOUNTER — OUTPATIENT (OUTPATIENT)
Dept: OUTPATIENT SERVICES | Facility: HOSPITAL | Age: 82
LOS: 1 days | Discharge: HOME | End: 2022-08-18

## 2022-08-18 ENCOUNTER — APPOINTMENT (OUTPATIENT)
Dept: MEDICATION MANAGEMENT | Facility: CLINIC | Age: 82
End: 2022-08-18

## 2022-08-18 DIAGNOSIS — I48.91 UNSPECIFIED ATRIAL FIBRILLATION: ICD-10-CM

## 2022-08-18 DIAGNOSIS — Z79.01 LONG TERM (CURRENT) USE OF ANTICOAGULANTS: ICD-10-CM

## 2022-08-18 DIAGNOSIS — Z95.1 PRESENCE OF AORTOCORONARY BYPASS GRAFT: Chronic | ICD-10-CM

## 2022-08-18 DIAGNOSIS — Z98.890 OTHER SPECIFIED POSTPROCEDURAL STATES: Chronic | ICD-10-CM

## 2022-08-18 LAB — INR PPP: 2.6

## 2022-09-01 ENCOUNTER — RX RENEWAL (OUTPATIENT)
Age: 82
End: 2022-09-01

## 2022-09-01 RX ORDER — SACUBITRIL AND VALSARTAN 49; 51 MG/1; MG/1
49-51 TABLET, FILM COATED ORAL
Qty: 180 | Refills: 3 | Status: ACTIVE | COMMUNITY
Start: 2022-09-01 | End: 1900-01-01

## 2022-09-15 ENCOUNTER — APPOINTMENT (OUTPATIENT)
Dept: MEDICATION MANAGEMENT | Facility: CLINIC | Age: 82
End: 2022-09-15

## 2022-09-15 ENCOUNTER — OUTPATIENT (OUTPATIENT)
Dept: OUTPATIENT SERVICES | Facility: HOSPITAL | Age: 82
LOS: 1 days | Discharge: HOME | End: 2022-09-15

## 2022-09-15 DIAGNOSIS — I48.91 UNSPECIFIED ATRIAL FIBRILLATION: ICD-10-CM

## 2022-09-15 DIAGNOSIS — Z95.1 PRESENCE OF AORTOCORONARY BYPASS GRAFT: Chronic | ICD-10-CM

## 2022-09-15 DIAGNOSIS — Z98.890 OTHER SPECIFIED POSTPROCEDURAL STATES: Chronic | ICD-10-CM

## 2022-09-15 DIAGNOSIS — Z79.01 LONG TERM (CURRENT) USE OF ANTICOAGULANTS: ICD-10-CM

## 2022-09-22 LAB — INR PPP: 2.5

## 2022-10-13 ENCOUNTER — APPOINTMENT (OUTPATIENT)
Dept: MEDICATION MANAGEMENT | Facility: CLINIC | Age: 82
End: 2022-10-13

## 2022-10-13 ENCOUNTER — OUTPATIENT (OUTPATIENT)
Dept: OUTPATIENT SERVICES | Facility: HOSPITAL | Age: 82
LOS: 1 days | Discharge: HOME | End: 2022-10-13

## 2022-10-13 DIAGNOSIS — Z79.01 LONG TERM (CURRENT) USE OF ANTICOAGULANTS: ICD-10-CM

## 2022-10-13 DIAGNOSIS — Z98.890 OTHER SPECIFIED POSTPROCEDURAL STATES: Chronic | ICD-10-CM

## 2022-10-13 DIAGNOSIS — I48.91 UNSPECIFIED ATRIAL FIBRILLATION: ICD-10-CM

## 2022-10-13 DIAGNOSIS — Z95.1 PRESENCE OF AORTOCORONARY BYPASS GRAFT: Chronic | ICD-10-CM

## 2022-10-13 LAB — INR PPP: 2.4

## 2022-11-03 ENCOUNTER — OUTPATIENT (OUTPATIENT)
Dept: OUTPATIENT SERVICES | Facility: HOSPITAL | Age: 82
LOS: 1 days | Discharge: HOME | End: 2022-11-03

## 2022-11-03 ENCOUNTER — NON-APPOINTMENT (OUTPATIENT)
Age: 82
End: 2022-11-03

## 2022-11-03 ENCOUNTER — APPOINTMENT (OUTPATIENT)
Dept: MEDICATION MANAGEMENT | Facility: CLINIC | Age: 82
End: 2022-11-03

## 2022-11-03 DIAGNOSIS — I48.91 UNSPECIFIED ATRIAL FIBRILLATION: ICD-10-CM

## 2022-11-03 DIAGNOSIS — Z95.1 PRESENCE OF AORTOCORONARY BYPASS GRAFT: Chronic | ICD-10-CM

## 2022-11-03 DIAGNOSIS — Z79.01 LONG TERM (CURRENT) USE OF ANTICOAGULANTS: ICD-10-CM

## 2022-11-03 DIAGNOSIS — Z98.890 OTHER SPECIFIED POSTPROCEDURAL STATES: Chronic | ICD-10-CM

## 2022-11-03 LAB — INR PPP: 2.3

## 2022-11-09 ENCOUNTER — APPOINTMENT (OUTPATIENT)
Dept: CARDIOLOGY | Facility: CLINIC | Age: 82
End: 2022-11-09

## 2022-12-01 ENCOUNTER — APPOINTMENT (OUTPATIENT)
Dept: MEDICATION MANAGEMENT | Facility: CLINIC | Age: 82
End: 2022-12-01

## 2022-12-06 ENCOUNTER — OUTPATIENT (OUTPATIENT)
Dept: OUTPATIENT SERVICES | Facility: HOSPITAL | Age: 82
LOS: 1 days | Discharge: HOME | End: 2022-12-06

## 2022-12-06 ENCOUNTER — APPOINTMENT (OUTPATIENT)
Dept: MEDICATION MANAGEMENT | Facility: CLINIC | Age: 82
End: 2022-12-06

## 2022-12-06 DIAGNOSIS — I48.91 UNSPECIFIED ATRIAL FIBRILLATION: ICD-10-CM

## 2022-12-06 DIAGNOSIS — Z79.01 LONG TERM (CURRENT) USE OF ANTICOAGULANTS: ICD-10-CM

## 2022-12-06 DIAGNOSIS — Z95.1 PRESENCE OF AORTOCORONARY BYPASS GRAFT: Chronic | ICD-10-CM

## 2022-12-06 DIAGNOSIS — Z98.890 OTHER SPECIFIED POSTPROCEDURAL STATES: Chronic | ICD-10-CM

## 2022-12-06 LAB — INR PPP: 3.6

## 2022-12-12 ENCOUNTER — OUTPATIENT (OUTPATIENT)
Dept: OUTPATIENT SERVICES | Facility: HOSPITAL | Age: 82
LOS: 1 days | Discharge: HOME | End: 2022-12-12

## 2022-12-12 ENCOUNTER — APPOINTMENT (OUTPATIENT)
Dept: MEDICATION MANAGEMENT | Facility: CLINIC | Age: 82
End: 2022-12-12

## 2022-12-12 DIAGNOSIS — Z98.890 OTHER SPECIFIED POSTPROCEDURAL STATES: Chronic | ICD-10-CM

## 2022-12-12 DIAGNOSIS — I48.91 UNSPECIFIED ATRIAL FIBRILLATION: ICD-10-CM

## 2022-12-12 DIAGNOSIS — Z95.1 PRESENCE OF AORTOCORONARY BYPASS GRAFT: Chronic | ICD-10-CM

## 2022-12-12 DIAGNOSIS — Z79.01 LONG TERM (CURRENT) USE OF ANTICOAGULANTS: ICD-10-CM

## 2022-12-12 LAB — INR PPP: 2

## 2023-01-09 ENCOUNTER — OUTPATIENT (OUTPATIENT)
Dept: OUTPATIENT SERVICES | Facility: HOSPITAL | Age: 83
LOS: 1 days | Discharge: HOME | End: 2023-01-09

## 2023-01-09 ENCOUNTER — APPOINTMENT (OUTPATIENT)
Dept: MEDICATION MANAGEMENT | Facility: CLINIC | Age: 83
End: 2023-01-09

## 2023-01-09 DIAGNOSIS — Z79.01 LONG TERM (CURRENT) USE OF ANTICOAGULANTS: ICD-10-CM

## 2023-01-09 DIAGNOSIS — Z98.890 OTHER SPECIFIED POSTPROCEDURAL STATES: Chronic | ICD-10-CM

## 2023-01-09 DIAGNOSIS — I48.91 UNSPECIFIED ATRIAL FIBRILLATION: ICD-10-CM

## 2023-01-09 DIAGNOSIS — Z95.1 PRESENCE OF AORTOCORONARY BYPASS GRAFT: Chronic | ICD-10-CM

## 2023-01-09 LAB — INR PPP: 2.6

## 2023-02-06 ENCOUNTER — APPOINTMENT (OUTPATIENT)
Dept: MEDICATION MANAGEMENT | Facility: CLINIC | Age: 83
End: 2023-02-06

## 2023-02-06 ENCOUNTER — OUTPATIENT (OUTPATIENT)
Dept: OUTPATIENT SERVICES | Facility: HOSPITAL | Age: 83
LOS: 1 days | End: 2023-02-06
Payer: MEDICARE

## 2023-02-06 DIAGNOSIS — Z95.1 PRESENCE OF AORTOCORONARY BYPASS GRAFT: Chronic | ICD-10-CM

## 2023-02-06 DIAGNOSIS — I48.91 UNSPECIFIED ATRIAL FIBRILLATION: ICD-10-CM

## 2023-02-06 DIAGNOSIS — Z79.01 LONG TERM (CURRENT) USE OF ANTICOAGULANTS: ICD-10-CM

## 2023-02-06 DIAGNOSIS — Z98.890 OTHER SPECIFIED POSTPROCEDURAL STATES: Chronic | ICD-10-CM

## 2023-02-06 LAB — INR PPP: 2.6

## 2023-02-06 PROCEDURE — 99211 OFF/OP EST MAY X REQ PHY/QHP: CPT | Mod: 95

## 2023-02-07 DIAGNOSIS — I48.91 UNSPECIFIED ATRIAL FIBRILLATION: ICD-10-CM

## 2023-02-07 DIAGNOSIS — Z79.01 LONG TERM (CURRENT) USE OF ANTICOAGULANTS: ICD-10-CM

## 2023-03-02 NOTE — PATIENT PROFILE ADULT. - PRO ARRIVE FROM
Accompanied by Mom Jackeline    Parental Concerns- Talk about sleep and fussiness     Water Source:  private well    Second Hand Smoke Exposure No    Pets Yes    Parental Hearing Concerns No    Parental Vision Concerns No    Screening/Safety:  Child rides in the middle row of the car.   Child rides in a infant carrier carseat.  Any patient education given No.    home

## 2023-03-06 ENCOUNTER — OUTPATIENT (OUTPATIENT)
Dept: OUTPATIENT SERVICES | Facility: HOSPITAL | Age: 83
LOS: 1 days | End: 2023-03-06
Payer: MEDICARE

## 2023-03-06 ENCOUNTER — APPOINTMENT (OUTPATIENT)
Dept: MEDICATION MANAGEMENT | Facility: CLINIC | Age: 83
End: 2023-03-06

## 2023-03-06 DIAGNOSIS — Z79.01 LONG TERM (CURRENT) USE OF ANTICOAGULANTS: ICD-10-CM

## 2023-03-06 DIAGNOSIS — I48.91 UNSPECIFIED ATRIAL FIBRILLATION: ICD-10-CM

## 2023-03-06 DIAGNOSIS — Z95.1 PRESENCE OF AORTOCORONARY BYPASS GRAFT: Chronic | ICD-10-CM

## 2023-03-06 DIAGNOSIS — Z98.890 OTHER SPECIFIED POSTPROCEDURAL STATES: Chronic | ICD-10-CM

## 2023-03-06 LAB — INR PPP: 2.8

## 2023-03-06 PROCEDURE — 99211 OFF/OP EST MAY X REQ PHY/QHP: CPT | Mod: 95

## 2023-04-13 ENCOUNTER — OUTPATIENT (OUTPATIENT)
Dept: OUTPATIENT SERVICES | Facility: HOSPITAL | Age: 83
LOS: 1 days | End: 2023-04-13
Payer: MEDICARE

## 2023-04-13 ENCOUNTER — APPOINTMENT (OUTPATIENT)
Dept: MEDICATION MANAGEMENT | Facility: CLINIC | Age: 83
End: 2023-04-13

## 2023-04-13 DIAGNOSIS — I48.91 UNSPECIFIED ATRIAL FIBRILLATION: ICD-10-CM

## 2023-04-13 DIAGNOSIS — Z98.890 OTHER SPECIFIED POSTPROCEDURAL STATES: Chronic | ICD-10-CM

## 2023-04-13 DIAGNOSIS — Z95.1 PRESENCE OF AORTOCORONARY BYPASS GRAFT: Chronic | ICD-10-CM

## 2023-04-13 DIAGNOSIS — Z79.01 LONG TERM (CURRENT) USE OF ANTICOAGULANTS: ICD-10-CM

## 2023-04-13 PROCEDURE — 99211 OFF/OP EST MAY X REQ PHY/QHP: CPT | Mod: 95

## 2023-04-13 RX ORDER — WARFARIN 2.5 MG/1
2.5 TABLET ORAL
Qty: 120 | Refills: 3 | Status: ACTIVE | COMMUNITY
Start: 1900-01-01 | End: 1900-01-01

## 2023-04-14 DIAGNOSIS — Z79.01 LONG TERM (CURRENT) USE OF ANTICOAGULANTS: ICD-10-CM

## 2023-04-14 DIAGNOSIS — I48.91 UNSPECIFIED ATRIAL FIBRILLATION: ICD-10-CM

## 2023-05-09 ENCOUNTER — OUTPATIENT (OUTPATIENT)
Dept: OUTPATIENT SERVICES | Facility: HOSPITAL | Age: 83
LOS: 1 days | End: 2023-05-09
Payer: MEDICARE

## 2023-05-09 ENCOUNTER — APPOINTMENT (OUTPATIENT)
Dept: MEDICATION MANAGEMENT | Facility: CLINIC | Age: 83
End: 2023-05-09

## 2023-05-09 DIAGNOSIS — Z79.01 LONG TERM (CURRENT) USE OF ANTICOAGULANTS: ICD-10-CM

## 2023-05-09 DIAGNOSIS — I48.91 UNSPECIFIED ATRIAL FIBRILLATION: ICD-10-CM

## 2023-05-09 DIAGNOSIS — Z95.1 PRESENCE OF AORTOCORONARY BYPASS GRAFT: Chronic | ICD-10-CM

## 2023-05-09 DIAGNOSIS — Z98.890 OTHER SPECIFIED POSTPROCEDURAL STATES: Chronic | ICD-10-CM

## 2023-05-09 LAB — INR PPP: 2.6

## 2023-05-09 PROCEDURE — 99211 OFF/OP EST MAY X REQ PHY/QHP: CPT | Mod: 95

## 2023-05-10 DIAGNOSIS — I48.91 UNSPECIFIED ATRIAL FIBRILLATION: ICD-10-CM

## 2023-05-10 DIAGNOSIS — Z79.01 LONG TERM (CURRENT) USE OF ANTICOAGULANTS: ICD-10-CM

## 2023-06-06 ENCOUNTER — OUTPATIENT (OUTPATIENT)
Dept: OUTPATIENT SERVICES | Facility: HOSPITAL | Age: 83
LOS: 1 days | End: 2023-06-06
Payer: MEDICARE

## 2023-06-06 ENCOUNTER — APPOINTMENT (OUTPATIENT)
Dept: MEDICATION MANAGEMENT | Facility: CLINIC | Age: 83
End: 2023-06-06

## 2023-06-06 DIAGNOSIS — I48.91 UNSPECIFIED ATRIAL FIBRILLATION: ICD-10-CM

## 2023-06-06 DIAGNOSIS — Z79.01 LONG TERM (CURRENT) USE OF ANTICOAGULANTS: ICD-10-CM

## 2023-06-06 DIAGNOSIS — Z95.1 PRESENCE OF AORTOCORONARY BYPASS GRAFT: Chronic | ICD-10-CM

## 2023-06-06 LAB — INR PPP: 2.7

## 2023-06-06 PROCEDURE — 99211 OFF/OP EST MAY X REQ PHY/QHP: CPT | Mod: 95

## 2023-06-07 DIAGNOSIS — I48.91 UNSPECIFIED ATRIAL FIBRILLATION: ICD-10-CM

## 2023-06-07 DIAGNOSIS — Z79.01 LONG TERM (CURRENT) USE OF ANTICOAGULANTS: ICD-10-CM

## 2023-07-05 ENCOUNTER — OUTPATIENT (OUTPATIENT)
Dept: OUTPATIENT SERVICES | Facility: HOSPITAL | Age: 83
LOS: 1 days | End: 2023-07-05
Payer: MEDICARE

## 2023-07-05 ENCOUNTER — APPOINTMENT (OUTPATIENT)
Dept: MEDICATION MANAGEMENT | Facility: CLINIC | Age: 83
End: 2023-07-05

## 2023-07-05 DIAGNOSIS — Z98.890 OTHER SPECIFIED POSTPROCEDURAL STATES: Chronic | ICD-10-CM

## 2023-07-05 DIAGNOSIS — Z79.01 LONG TERM (CURRENT) USE OF ANTICOAGULANTS: ICD-10-CM

## 2023-07-05 DIAGNOSIS — Z95.1 PRESENCE OF AORTOCORONARY BYPASS GRAFT: Chronic | ICD-10-CM

## 2023-07-05 DIAGNOSIS — I48.91 UNSPECIFIED ATRIAL FIBRILLATION: ICD-10-CM

## 2023-07-05 LAB — INR PPP: 2.3

## 2023-07-05 PROCEDURE — 99211 OFF/OP EST MAY X REQ PHY/QHP: CPT | Mod: 95

## 2023-07-06 DIAGNOSIS — I48.91 UNSPECIFIED ATRIAL FIBRILLATION: ICD-10-CM

## 2023-07-06 DIAGNOSIS — Z79.01 LONG TERM (CURRENT) USE OF ANTICOAGULANTS: ICD-10-CM

## 2023-08-02 ENCOUNTER — APPOINTMENT (OUTPATIENT)
Dept: MEDICATION MANAGEMENT | Facility: CLINIC | Age: 83
End: 2023-08-02

## 2023-08-02 ENCOUNTER — OUTPATIENT (OUTPATIENT)
Dept: OUTPATIENT SERVICES | Facility: HOSPITAL | Age: 83
LOS: 1 days | End: 2023-08-02
Payer: MEDICARE

## 2023-08-02 DIAGNOSIS — Z95.1 PRESENCE OF AORTOCORONARY BYPASS GRAFT: Chronic | ICD-10-CM

## 2023-08-02 DIAGNOSIS — I48.91 UNSPECIFIED ATRIAL FIBRILLATION: ICD-10-CM

## 2023-08-02 DIAGNOSIS — Z79.01 LONG TERM (CURRENT) USE OF ANTICOAGULANTS: ICD-10-CM

## 2023-08-02 DIAGNOSIS — Z98.890 OTHER SPECIFIED POSTPROCEDURAL STATES: Chronic | ICD-10-CM

## 2023-08-02 LAB — INR PPP: 2.8

## 2023-08-02 PROCEDURE — 99211 OFF/OP EST MAY X REQ PHY/QHP: CPT | Mod: 95

## 2023-08-03 DIAGNOSIS — Z79.01 LONG TERM (CURRENT) USE OF ANTICOAGULANTS: ICD-10-CM

## 2023-08-03 DIAGNOSIS — I48.91 UNSPECIFIED ATRIAL FIBRILLATION: ICD-10-CM

## 2023-09-08 ENCOUNTER — OUTPATIENT (OUTPATIENT)
Dept: OUTPATIENT SERVICES | Facility: HOSPITAL | Age: 83
LOS: 1 days | End: 2023-09-08
Payer: MEDICARE

## 2023-09-08 ENCOUNTER — APPOINTMENT (OUTPATIENT)
Dept: MEDICATION MANAGEMENT | Facility: CLINIC | Age: 83
End: 2023-09-08

## 2023-09-08 DIAGNOSIS — Z79.01 LONG TERM (CURRENT) USE OF ANTICOAGULANTS: ICD-10-CM

## 2023-09-08 DIAGNOSIS — Z95.1 PRESENCE OF AORTOCORONARY BYPASS GRAFT: Chronic | ICD-10-CM

## 2023-09-08 DIAGNOSIS — Z98.890 OTHER SPECIFIED POSTPROCEDURAL STATES: Chronic | ICD-10-CM

## 2023-09-08 LAB — INR PPP: 2.1

## 2023-09-08 PROCEDURE — 99211 OFF/OP EST MAY X REQ PHY/QHP: CPT | Mod: 95

## 2023-09-09 DIAGNOSIS — Z79.01 LONG TERM (CURRENT) USE OF ANTICOAGULANTS: ICD-10-CM

## 2023-10-05 ENCOUNTER — OUTPATIENT (OUTPATIENT)
Dept: OUTPATIENT SERVICES | Facility: HOSPITAL | Age: 83
LOS: 1 days | End: 2023-10-05
Payer: MEDICARE

## 2023-10-05 ENCOUNTER — APPOINTMENT (OUTPATIENT)
Dept: MEDICATION MANAGEMENT | Facility: CLINIC | Age: 83
End: 2023-10-05

## 2023-10-05 DIAGNOSIS — I48.91 UNSPECIFIED ATRIAL FIBRILLATION: ICD-10-CM

## 2023-10-05 DIAGNOSIS — Z95.1 PRESENCE OF AORTOCORONARY BYPASS GRAFT: Chronic | ICD-10-CM

## 2023-10-05 DIAGNOSIS — Z79.01 LONG TERM (CURRENT) USE OF ANTICOAGULANTS: ICD-10-CM

## 2023-10-05 LAB — INR PPP: 2.2

## 2023-10-05 PROCEDURE — G0463: CPT

## 2023-10-06 DIAGNOSIS — Z79.01 LONG TERM (CURRENT) USE OF ANTICOAGULANTS: ICD-10-CM

## 2023-10-06 DIAGNOSIS — I48.91 UNSPECIFIED ATRIAL FIBRILLATION: ICD-10-CM

## 2023-11-02 ENCOUNTER — APPOINTMENT (OUTPATIENT)
Dept: MEDICATION MANAGEMENT | Facility: CLINIC | Age: 83
End: 2023-11-02

## 2023-11-02 ENCOUNTER — OUTPATIENT (OUTPATIENT)
Dept: OUTPATIENT SERVICES | Facility: HOSPITAL | Age: 83
LOS: 1 days | End: 2023-11-02
Payer: MEDICARE

## 2023-11-02 DIAGNOSIS — Z98.890 OTHER SPECIFIED POSTPROCEDURAL STATES: Chronic | ICD-10-CM

## 2023-11-02 DIAGNOSIS — Z79.01 LONG TERM (CURRENT) USE OF ANTICOAGULANTS: ICD-10-CM

## 2023-11-02 DIAGNOSIS — Z95.1 PRESENCE OF AORTOCORONARY BYPASS GRAFT: Chronic | ICD-10-CM

## 2023-11-02 DIAGNOSIS — I48.91 UNSPECIFIED ATRIAL FIBRILLATION: ICD-10-CM

## 2023-11-02 LAB — INR PPP: 2.4

## 2023-11-02 PROCEDURE — 99211 OFF/OP EST MAY X REQ PHY/QHP: CPT | Mod: 95

## 2023-11-03 DIAGNOSIS — Z79.01 LONG TERM (CURRENT) USE OF ANTICOAGULANTS: ICD-10-CM

## 2023-11-03 DIAGNOSIS — I48.91 UNSPECIFIED ATRIAL FIBRILLATION: ICD-10-CM

## 2023-12-07 ENCOUNTER — APPOINTMENT (OUTPATIENT)
Dept: MEDICATION MANAGEMENT | Facility: CLINIC | Age: 83
End: 2023-12-07

## 2023-12-07 ENCOUNTER — OUTPATIENT (OUTPATIENT)
Dept: OUTPATIENT SERVICES | Facility: HOSPITAL | Age: 83
LOS: 1 days | End: 2023-12-07
Payer: MEDICARE

## 2023-12-07 DIAGNOSIS — Z98.890 OTHER SPECIFIED POSTPROCEDURAL STATES: Chronic | ICD-10-CM

## 2023-12-07 DIAGNOSIS — Z79.01 LONG TERM (CURRENT) USE OF ANTICOAGULANTS: ICD-10-CM

## 2023-12-07 DIAGNOSIS — I48.91 UNSPECIFIED ATRIAL FIBRILLATION: ICD-10-CM

## 2023-12-07 DIAGNOSIS — Z95.1 PRESENCE OF AORTOCORONARY BYPASS GRAFT: Chronic | ICD-10-CM

## 2023-12-07 LAB — INR PPP: 2.1

## 2023-12-07 PROCEDURE — 99211 OFF/OP EST MAY X REQ PHY/QHP: CPT | Mod: 95

## 2023-12-08 DIAGNOSIS — I48.91 UNSPECIFIED ATRIAL FIBRILLATION: ICD-10-CM

## 2023-12-08 DIAGNOSIS — Z79.01 LONG TERM (CURRENT) USE OF ANTICOAGULANTS: ICD-10-CM

## 2024-01-10 ENCOUNTER — OUTPATIENT (OUTPATIENT)
Dept: OUTPATIENT SERVICES | Facility: HOSPITAL | Age: 84
LOS: 1 days | End: 2024-01-10
Payer: MEDICARE

## 2024-01-10 ENCOUNTER — APPOINTMENT (OUTPATIENT)
Dept: MEDICATION MANAGEMENT | Facility: CLINIC | Age: 84
End: 2024-01-10

## 2024-01-10 DIAGNOSIS — I48.91 UNSPECIFIED ATRIAL FIBRILLATION: ICD-10-CM

## 2024-01-10 DIAGNOSIS — Z95.1 PRESENCE OF AORTOCORONARY BYPASS GRAFT: Chronic | ICD-10-CM

## 2024-01-10 DIAGNOSIS — Z98.890 OTHER SPECIFIED POSTPROCEDURAL STATES: Chronic | ICD-10-CM

## 2024-01-10 DIAGNOSIS — Z79.01 LONG TERM (CURRENT) USE OF ANTICOAGULANTS: ICD-10-CM

## 2024-01-10 LAB — INR PPP: 3.6

## 2024-01-10 PROCEDURE — G0463: CPT

## 2024-01-11 DIAGNOSIS — I48.91 UNSPECIFIED ATRIAL FIBRILLATION: ICD-10-CM

## 2024-01-11 DIAGNOSIS — Z79.01 LONG TERM (CURRENT) USE OF ANTICOAGULANTS: ICD-10-CM

## 2024-01-24 ENCOUNTER — OUTPATIENT (OUTPATIENT)
Dept: OUTPATIENT SERVICES | Facility: HOSPITAL | Age: 84
LOS: 1 days | End: 2024-01-24
Payer: MEDICARE

## 2024-01-24 ENCOUNTER — APPOINTMENT (OUTPATIENT)
Dept: MEDICATION MANAGEMENT | Facility: CLINIC | Age: 84
End: 2024-01-24

## 2024-01-24 DIAGNOSIS — I48.91 UNSPECIFIED ATRIAL FIBRILLATION: ICD-10-CM

## 2024-01-24 DIAGNOSIS — Z79.01 LONG TERM (CURRENT) USE OF ANTICOAGULANTS: ICD-10-CM

## 2024-01-24 DIAGNOSIS — Z95.1 PRESENCE OF AORTOCORONARY BYPASS GRAFT: Chronic | ICD-10-CM

## 2024-01-24 LAB — INR PPP: 3.2

## 2024-01-24 PROCEDURE — 99211 OFF/OP EST MAY X REQ PHY/QHP: CPT | Mod: 95

## 2024-01-25 DIAGNOSIS — Z79.01 LONG TERM (CURRENT) USE OF ANTICOAGULANTS: ICD-10-CM

## 2024-01-25 DIAGNOSIS — I48.91 UNSPECIFIED ATRIAL FIBRILLATION: ICD-10-CM

## 2024-02-07 ENCOUNTER — APPOINTMENT (OUTPATIENT)
Dept: MEDICATION MANAGEMENT | Facility: CLINIC | Age: 84
End: 2024-02-07

## 2024-02-07 ENCOUNTER — OUTPATIENT (OUTPATIENT)
Dept: OUTPATIENT SERVICES | Facility: HOSPITAL | Age: 84
LOS: 1 days | End: 2024-02-07
Payer: MEDICARE

## 2024-02-07 DIAGNOSIS — Z98.890 OTHER SPECIFIED POSTPROCEDURAL STATES: Chronic | ICD-10-CM

## 2024-02-07 DIAGNOSIS — Z79.01 LONG TERM (CURRENT) USE OF ANTICOAGULANTS: ICD-10-CM

## 2024-02-07 DIAGNOSIS — I48.91 UNSPECIFIED ATRIAL FIBRILLATION: ICD-10-CM

## 2024-02-07 LAB — INR PPP: 2.9

## 2024-02-07 PROCEDURE — 99211 OFF/OP EST MAY X REQ PHY/QHP: CPT | Mod: 95

## 2024-02-08 DIAGNOSIS — I48.91 UNSPECIFIED ATRIAL FIBRILLATION: ICD-10-CM

## 2024-02-08 DIAGNOSIS — Z79.01 LONG TERM (CURRENT) USE OF ANTICOAGULANTS: ICD-10-CM

## 2024-03-06 ENCOUNTER — APPOINTMENT (OUTPATIENT)
Dept: MEDICATION MANAGEMENT | Facility: CLINIC | Age: 84
End: 2024-03-06

## 2024-03-06 ENCOUNTER — OUTPATIENT (OUTPATIENT)
Dept: OUTPATIENT SERVICES | Facility: HOSPITAL | Age: 84
LOS: 1 days | End: 2024-03-06
Payer: MEDICARE

## 2024-03-06 DIAGNOSIS — Z95.1 PRESENCE OF AORTOCORONARY BYPASS GRAFT: Chronic | ICD-10-CM

## 2024-03-06 DIAGNOSIS — I48.91 UNSPECIFIED ATRIAL FIBRILLATION: ICD-10-CM

## 2024-03-06 DIAGNOSIS — Z98.890 OTHER SPECIFIED POSTPROCEDURAL STATES: Chronic | ICD-10-CM

## 2024-03-06 DIAGNOSIS — Z79.01 LONG TERM (CURRENT) USE OF ANTICOAGULANTS: ICD-10-CM

## 2024-03-06 LAB — INR PPP: 2.9

## 2024-03-06 PROCEDURE — 99211 OFF/OP EST MAY X REQ PHY/QHP: CPT | Mod: 95

## 2024-03-07 DIAGNOSIS — Z79.01 LONG TERM (CURRENT) USE OF ANTICOAGULANTS: ICD-10-CM

## 2024-03-07 DIAGNOSIS — I48.91 UNSPECIFIED ATRIAL FIBRILLATION: ICD-10-CM

## 2024-04-03 ENCOUNTER — OUTPATIENT (OUTPATIENT)
Dept: OUTPATIENT SERVICES | Facility: HOSPITAL | Age: 84
LOS: 1 days | End: 2024-04-03
Payer: MEDICARE

## 2024-04-03 ENCOUNTER — APPOINTMENT (OUTPATIENT)
Dept: MEDICATION MANAGEMENT | Facility: CLINIC | Age: 84
End: 2024-04-03

## 2024-04-03 DIAGNOSIS — I48.91 UNSPECIFIED ATRIAL FIBRILLATION: ICD-10-CM

## 2024-04-03 DIAGNOSIS — Z95.1 PRESENCE OF AORTOCORONARY BYPASS GRAFT: Chronic | ICD-10-CM

## 2024-04-03 DIAGNOSIS — Z98.890 OTHER SPECIFIED POSTPROCEDURAL STATES: Chronic | ICD-10-CM

## 2024-04-03 DIAGNOSIS — Z79.01 LONG TERM (CURRENT) USE OF ANTICOAGULANTS: ICD-10-CM

## 2024-04-03 LAB — INR PPP: 6.4

## 2024-04-03 PROCEDURE — 99211 OFF/OP EST MAY X REQ PHY/QHP: CPT | Mod: 95

## 2024-04-04 DIAGNOSIS — I48.91 UNSPECIFIED ATRIAL FIBRILLATION: ICD-10-CM

## 2024-04-04 DIAGNOSIS — Z79.01 LONG TERM (CURRENT) USE OF ANTICOAGULANTS: ICD-10-CM

## 2024-04-08 ENCOUNTER — OUTPATIENT (OUTPATIENT)
Dept: OUTPATIENT SERVICES | Facility: HOSPITAL | Age: 84
LOS: 1 days | End: 2024-04-08
Payer: MEDICARE

## 2024-04-08 ENCOUNTER — APPOINTMENT (OUTPATIENT)
Dept: MEDICATION MANAGEMENT | Facility: CLINIC | Age: 84
End: 2024-04-08

## 2024-04-08 DIAGNOSIS — Z98.890 OTHER SPECIFIED POSTPROCEDURAL STATES: Chronic | ICD-10-CM

## 2024-04-08 DIAGNOSIS — I48.91 UNSPECIFIED ATRIAL FIBRILLATION: ICD-10-CM

## 2024-04-08 DIAGNOSIS — Z79.01 LONG TERM (CURRENT) USE OF ANTICOAGULANTS: ICD-10-CM

## 2024-04-08 LAB — INR PPP: 2.5

## 2024-04-08 PROCEDURE — 99211 OFF/OP EST MAY X REQ PHY/QHP: CPT | Mod: 95

## 2024-04-09 DIAGNOSIS — I48.91 UNSPECIFIED ATRIAL FIBRILLATION: ICD-10-CM

## 2024-04-09 DIAGNOSIS — Z79.01 LONG TERM (CURRENT) USE OF ANTICOAGULANTS: ICD-10-CM

## 2024-04-09 NOTE — PRE-ANESTHESIA EVALUATION ADULT - NSANTHRISKNONERD_GEN_ALL_CORE
Assessment/Plan:  Pain upon ambulation.  Hammertoe formation.  Pes planus.  Need for orthotics.  Ingrown toenail.  Paronychia.  Acquired deformity foot bilateral.  Acquired pes planus bilateral.  History of contusion right big toe     Plan.  Chart reviewed.  Notes reviewed.  Foot exam performed.  Patient educated on condition.  Patient will use orthotics daily.   He will stretch daily.  Patient advised on proper shoe style and sizing.  Patient will use orthotics as directed.  Pedal hygiene care advised upon.  Patient advised on aftercare.  Return for follow-up            Diagnoses and all orders for this visit:     Pain in both feet     Onychomycosis     Hammer toes of both feet     Ingrown toenail     Hammer toe of right foot     Acquired deformity foot bilateral        Acquired pes planus.            Subjective:  Patient has pain upon ambulation.  He has pain in his toes with ambulation.  He has painful hammertoes.  He uses orthotics daily.  No history of trauma.  Patient believes his orthotics are no longer working.  He is requesting new orthotics     No Known Allergies        Current Outpatient Medications:     cholecalciferol (VITAMIN D3) 1,000 units tablet, Take 1,000 Units by mouth daily, Disp: , Rfl:     Cholecalciferol (VITAMIN D3) 84705 units CAPS, Take by mouth once a week, Disp: , Rfl:     diclofenac sodium (VOLTAREN) 1 %, Apply 4 g topically 4 (four) times a day (Patient taking differently: Apply 4 g topically 4 (four) times a day ), Disp: 4 Tube, Rfl: 1    doxazosin (CARDURA) 4 mg tablet, Take 4 mg by mouth daily at bedtime, Disp: , Rfl:     meloxicam (MOBIC) 7.5 mg tablet, Take 1 tablet (7.5 mg total) by mouth daily for 10 days (Patient not taking: Reported on 11/20/2019), Disp: 10 tablet, Rfl: 0    tamsulosin (FLOMAX) 0.4 mg, Take 0.4 mg by mouth daily with dinner, Disp: , Rfl:            Patient Active Problem List   Diagnosis    Plantar fasciitis    Pain in both feet    Congenital pes planus of 
No risk alerts present
right foot    Congenital pes planus of left foot    Tinea pedis of both feet    Dark stools             Patient ID: Jose Olson is a 57 y.o. male.     HPI     The following portions of the patient's history were reviewed and updated as appropriate:      family history includes Cancer in his father; Colon cancer in his maternal aunt; Heart disease in his mother; Hypertension in his father; Stomach cancer in his father.       reports that he has never smoked. He has never used smokeless tobacco. He reports that he drinks alcohol. He reports that he does not use drugs.      Objective:  Patient's shoes and socks removed.   Foot ExamPhysical Exam          Pulses palpable bilateral PT DP and PT pulses 2/4 bilateral  Significant pes planus bilateral, there is decreased  of the arch on weight-bearing  Right 2nd digit there is medial deviation of the PIPJ mild pain on palpation of 2nd MPJ mild swelling  Severe hallux limitus bilateral left worse than right less than 15° of dorsiflexion 1st ray  No hypermobility of the 1st ray  Muscle strength 5/5 all groups bilateral feet and ankles  Moderate equinus bilateral 5° dorsiflexion knee flexed and extended  Negative edema negative erythema no signs of infection  Nails are thickened discolored dystrophic mild onychomycosis  Ingrown nail both borders bilateral hallux     Ingrown right hallux medial border.  Mild hyperkeratotic lesion on the medial border but no sign of infection  Mild pain on palpation plantar medial heel bilateral  Some rigid hammertoes 2nd digit bilateral     Right hallux demonstrates dried subungual hematoma.  No evidence of lysis or cellulitis               
Risk Alerts:

## 2024-04-15 ENCOUNTER — OUTPATIENT (OUTPATIENT)
Dept: OUTPATIENT SERVICES | Facility: HOSPITAL | Age: 84
LOS: 1 days | End: 2024-04-15
Payer: MEDICARE

## 2024-04-15 ENCOUNTER — APPOINTMENT (OUTPATIENT)
Dept: MEDICATION MANAGEMENT | Facility: CLINIC | Age: 84
End: 2024-04-15

## 2024-04-15 DIAGNOSIS — Z79.01 LONG TERM (CURRENT) USE OF ANTICOAGULANTS: ICD-10-CM

## 2024-04-15 DIAGNOSIS — I48.91 UNSPECIFIED ATRIAL FIBRILLATION: ICD-10-CM

## 2024-04-15 DIAGNOSIS — Z95.1 PRESENCE OF AORTOCORONARY BYPASS GRAFT: Chronic | ICD-10-CM

## 2024-04-15 DIAGNOSIS — Z98.890 OTHER SPECIFIED POSTPROCEDURAL STATES: Chronic | ICD-10-CM

## 2024-04-15 LAB — INR PPP: 2.6

## 2024-04-15 PROCEDURE — 99211 OFF/OP EST MAY X REQ PHY/QHP: CPT | Mod: 95

## 2024-04-16 DIAGNOSIS — Z79.01 LONG TERM (CURRENT) USE OF ANTICOAGULANTS: ICD-10-CM

## 2024-04-16 DIAGNOSIS — I48.91 UNSPECIFIED ATRIAL FIBRILLATION: ICD-10-CM

## 2024-05-01 ENCOUNTER — OUTPATIENT (OUTPATIENT)
Dept: OUTPATIENT SERVICES | Facility: HOSPITAL | Age: 84
LOS: 1 days | End: 2024-05-01
Payer: MEDICARE

## 2024-05-01 ENCOUNTER — APPOINTMENT (OUTPATIENT)
Dept: MEDICATION MANAGEMENT | Facility: CLINIC | Age: 84
End: 2024-05-01

## 2024-05-01 DIAGNOSIS — Z95.1 PRESENCE OF AORTOCORONARY BYPASS GRAFT: Chronic | ICD-10-CM

## 2024-05-01 DIAGNOSIS — Z79.01 LONG TERM (CURRENT) USE OF ANTICOAGULANTS: ICD-10-CM

## 2024-05-01 DIAGNOSIS — Z98.890 OTHER SPECIFIED POSTPROCEDURAL STATES: Chronic | ICD-10-CM

## 2024-05-01 DIAGNOSIS — I48.91 UNSPECIFIED ATRIAL FIBRILLATION: ICD-10-CM

## 2024-05-01 PROCEDURE — 99211 OFF/OP EST MAY X REQ PHY/QHP: CPT | Mod: 95

## 2024-05-02 DIAGNOSIS — I48.91 UNSPECIFIED ATRIAL FIBRILLATION: ICD-10-CM

## 2024-05-02 DIAGNOSIS — Z79.01 LONG TERM (CURRENT) USE OF ANTICOAGULANTS: ICD-10-CM

## 2024-05-07 ENCOUNTER — OUTPATIENT (OUTPATIENT)
Dept: OUTPATIENT SERVICES | Facility: HOSPITAL | Age: 84
LOS: 1 days | End: 2024-05-07
Payer: MEDICARE

## 2024-05-07 ENCOUNTER — APPOINTMENT (OUTPATIENT)
Dept: MEDICATION MANAGEMENT | Facility: CLINIC | Age: 84
End: 2024-05-07

## 2024-05-07 DIAGNOSIS — Z98.890 OTHER SPECIFIED POSTPROCEDURAL STATES: Chronic | ICD-10-CM

## 2024-05-07 DIAGNOSIS — Z79.01 LONG TERM (CURRENT) USE OF ANTICOAGULANTS: ICD-10-CM

## 2024-05-07 DIAGNOSIS — I48.91 UNSPECIFIED ATRIAL FIBRILLATION: ICD-10-CM

## 2024-05-07 DIAGNOSIS — Z95.1 PRESENCE OF AORTOCORONARY BYPASS GRAFT: Chronic | ICD-10-CM

## 2024-05-07 LAB — INR PPP: 3.7

## 2024-05-07 PROCEDURE — G0463: CPT

## 2024-05-08 DIAGNOSIS — I48.91 UNSPECIFIED ATRIAL FIBRILLATION: ICD-10-CM

## 2024-05-08 DIAGNOSIS — Z79.01 LONG TERM (CURRENT) USE OF ANTICOAGULANTS: ICD-10-CM

## 2024-05-13 ENCOUNTER — APPOINTMENT (OUTPATIENT)
Dept: MEDICATION MANAGEMENT | Facility: CLINIC | Age: 84
End: 2024-05-13

## 2025-01-13 NOTE — CARDIOLOGY SUMMARY
"Subjective   Estephanie Scherer is a 28 y.o. female who is here for a routine GYN exam. I last saw her 2024.   -Hx of PCOS; on OCP and tolerating it well; regular bleeding pattern; sometimes with spotting right before placebo pills; no CI to OCP use; hx of HTN well managed and controlled on Lisinopril.  -Denies breast changes or concerns.  -Taking URO probiotic recently as well.   -She's lost 39 lbs since her last visit; states she has been working more hours at work and has been eating less / smaller portions / less sugar.    Complaints:   none  Periods: regular   Dysmenorrhea:  none    Current contraception: OCP  History of abnormal Pap smear: no  History of abnormal mammogram: no      OB History          0    Para   0    Term   0       0    AB   0    Living   0         SAB   0    IAB   0    Ectopic   0    Multiple   0    Live Births   0                  Review of Systems   Constitutional:  Negative for chills, fatigue, fever and unexpected weight change.   Respiratory:  Negative for cough and shortness of breath.    Gastrointestinal:  Negative for abdominal pain, nausea and vomiting.   Genitourinary:  Negative for dyspareunia, dysuria, pelvic pain and vaginal discharge.   Skin:  Negative for color change and rash.   Neurological:  Negative for dizziness and headaches.       Objective   /76   Ht 1.676 m (5' 6\")   Wt 111 kg (245 lb)   LMP 2025   BMI 39.54 kg/m²        General:   Alert and oriented, in no acute distress   Neck: Supple. No visible thyromegaly.    Breast/Axilla: Normal to palpation bilaterally without masses, skin changes, or nipple discharge.    Abdomen: Soft, non-tender, without masses or organomegaly; BMI 39.54   Vulva: Normal architecture without erythema, masses, or lesions.    Vagina: Normal mucosa without lesions, masses, or atrophy. No abnormal vaginal discharge.    Cervix: Normal without masses, lesions, or signs of cervicitis; pap performed    Uterus: Grossly " [___] : [unfilled] normal but limited exam due to body habitus; non-tender   Adnexa: Grossly normal but limited exam due to body habitus; non-tender   Pelvic Floor Normal    Psych Normal affect. Normal mood.      Assessment/Plan   Diagnoses and all orders for this visit:  Encounter for annual routine gynecological examination  -     THINPREP PAP TEST  Screening for human papillomavirus (HPV)  -     THINPREP PAP TEST  Surveillance for birth control, oral contraceptives  -     norgestimate-ethinyl estradiol (Ortho-Cyclen) 0.25-35 mg-mcg tablet; Take 1 tablet by mouth once daily.  PCOS (polycystic ovarian syndrome)  -     norgestimate-ethinyl estradiol (Ortho-Cyclen) 0.25-35 mg-mcg tablet; Take 1 tablet by mouth once daily.    Beulah Garcia PA-C